# Patient Record
Sex: FEMALE | Race: WHITE | Employment: OTHER | URBAN - METROPOLITAN AREA
[De-identification: names, ages, dates, MRNs, and addresses within clinical notes are randomized per-mention and may not be internally consistent; named-entity substitution may affect disease eponyms.]

---

## 2017-05-27 ENCOUNTER — APPOINTMENT (OUTPATIENT)
Dept: GENERAL RADIOLOGY | Age: 67
DRG: 315 | End: 2017-05-27
Attending: EMERGENCY MEDICINE
Payer: MEDICARE

## 2017-05-27 ENCOUNTER — HOSPITAL ENCOUNTER (INPATIENT)
Age: 67
LOS: 3 days | Discharge: HOME | DRG: 315 | End: 2017-05-30
Attending: EMERGENCY MEDICINE | Admitting: INTERNAL MEDICINE
Payer: MEDICARE

## 2017-05-27 DIAGNOSIS — I21.4 NSTEMI (NON-ST ELEVATED MYOCARDIAL INFARCTION) (HCC): Primary | ICD-10-CM

## 2017-05-27 PROBLEM — I50.32 DIASTOLIC CHF, CHRONIC (HCC): Status: ACTIVE | Noted: 2017-05-27

## 2017-05-27 LAB
ALBUMIN SERPL BCP-MCNC: 3.2 G/DL (ref 3.2–4.6)
ALBUMIN/GLOB SERPL: 0.8 {RATIO} (ref 1.2–3.5)
ALP SERPL-CCNC: 82 U/L (ref 50–136)
ALT SERPL-CCNC: 35 U/L (ref 12–65)
ANION GAP BLD CALC-SCNC: 8 MMOL/L (ref 7–16)
AST SERPL W P-5'-P-CCNC: 25 U/L (ref 15–37)
ATRIAL RATE: 72 BPM
BASOPHILS # BLD AUTO: 0 K/UL (ref 0–0.2)
BASOPHILS # BLD: 0 % (ref 0–2)
BILIRUB SERPL-MCNC: 0.8 MG/DL (ref 0.2–1.1)
BNP SERPL-MCNC: 387 PG/ML
BUN SERPL-MCNC: 23 MG/DL (ref 8–23)
CALCIUM SERPL-MCNC: 8.8 MG/DL (ref 8.3–10.4)
CALCULATED R AXIS, ECG10: -46 DEGREES
CALCULATED T AXIS, ECG11: -71 DEGREES
CHLORIDE SERPL-SCNC: 100 MMOL/L (ref 98–107)
CO2 SERPL-SCNC: 28 MMOL/L (ref 21–32)
CREAT SERPL-MCNC: 1.03 MG/DL (ref 0.6–1)
D DIMER PPP FEU-MCNC: 0.59 UG/ML(FEU)
DIAGNOSIS, 93000: NORMAL
DIFFERENTIAL METHOD BLD: ABNORMAL
EOSINOPHIL # BLD: 0.1 K/UL (ref 0–0.8)
EOSINOPHIL NFR BLD: 1 % (ref 0.5–7.8)
ERYTHROCYTE [DISTWIDTH] IN BLOOD BY AUTOMATED COUNT: 13.7 % (ref 11.9–14.6)
EST. AVERAGE GLUCOSE BLD GHB EST-MCNC: 114 MG/DL
GLOBULIN SER CALC-MCNC: 3.9 G/DL (ref 2.3–3.5)
GLUCOSE SERPL-MCNC: 104 MG/DL (ref 65–100)
HBA1C MFR BLD: 5.6 % (ref 4.8–6)
HCT VFR BLD AUTO: 34.6 % (ref 35.8–46.3)
HGB BLD-MCNC: 11.4 G/DL (ref 11.7–15.4)
IMM GRANULOCYTES # BLD: 0 K/UL (ref 0–0.5)
IMM GRANULOCYTES NFR BLD AUTO: 0.4 % (ref 0–5)
LYMPHOCYTES # BLD AUTO: 22 % (ref 13–44)
LYMPHOCYTES # BLD: 2.3 K/UL (ref 0.5–4.6)
MCH RBC QN AUTO: 29.1 PG (ref 26.1–32.9)
MCHC RBC AUTO-ENTMCNC: 32.9 G/DL (ref 31.4–35)
MCV RBC AUTO: 88.3 FL (ref 79.6–97.8)
MONOCYTES # BLD: 1.7 K/UL (ref 0.1–1.3)
MONOCYTES NFR BLD AUTO: 17 % (ref 4–12)
NEUTS SEG # BLD: 6.1 K/UL (ref 1.7–8.2)
NEUTS SEG NFR BLD AUTO: 60 % (ref 43–78)
PLATELET # BLD AUTO: 244 K/UL (ref 150–450)
PMV BLD AUTO: 10.4 FL (ref 10.8–14.1)
POTASSIUM SERPL-SCNC: 3.9 MMOL/L (ref 3.5–5.1)
PROT SERPL-MCNC: 7.1 G/DL (ref 6.3–8.2)
Q-T INTERVAL, ECG07: 446 MS
QRS DURATION, ECG06: 114 MS
QTC CALCULATION (BEZET), ECG08: 508 MS
RBC # BLD AUTO: 3.92 M/UL (ref 4.05–5.25)
SODIUM SERPL-SCNC: 136 MMOL/L (ref 136–145)
TROPONIN I SERPL-MCNC: 0.16 NG/ML (ref 0.02–0.05)
TROPONIN I SERPL-MCNC: 0.2 NG/ML (ref 0.02–0.05)
TSH SERPL DL<=0.005 MIU/L-ACNC: 0.79 UIU/ML (ref 0.36–3.74)
VENTRICULAR RATE, ECG03: 78 BPM
WBC # BLD AUTO: 10.3 K/UL (ref 4.3–11.1)

## 2017-05-27 PROCEDURE — 84484 ASSAY OF TROPONIN QUANT: CPT | Performed by: EMERGENCY MEDICINE

## 2017-05-27 PROCEDURE — 84443 ASSAY THYROID STIM HORMONE: CPT | Performed by: INTERNAL MEDICINE

## 2017-05-27 PROCEDURE — 36415 COLL VENOUS BLD VENIPUNCTURE: CPT | Performed by: PHYSICIAN ASSISTANT

## 2017-05-27 PROCEDURE — 74011250637 HC RX REV CODE- 250/637: Performed by: PHYSICIAN ASSISTANT

## 2017-05-27 PROCEDURE — 80053 COMPREHEN METABOLIC PANEL: CPT | Performed by: EMERGENCY MEDICINE

## 2017-05-27 PROCEDURE — 99285 EMERGENCY DEPT VISIT HI MDM: CPT | Performed by: EMERGENCY MEDICINE

## 2017-05-27 PROCEDURE — 85025 COMPLETE CBC W/AUTO DIFF WBC: CPT | Performed by: EMERGENCY MEDICINE

## 2017-05-27 PROCEDURE — 85379 FIBRIN DEGRADATION QUANT: CPT | Performed by: INTERNAL MEDICINE

## 2017-05-27 PROCEDURE — 65660000000 HC RM CCU STEPDOWN

## 2017-05-27 PROCEDURE — 71020 XR CHEST PA LAT: CPT

## 2017-05-27 PROCEDURE — 83880 ASSAY OF NATRIURETIC PEPTIDE: CPT | Performed by: EMERGENCY MEDICINE

## 2017-05-27 PROCEDURE — 96360 HYDRATION IV INFUSION INIT: CPT | Performed by: EMERGENCY MEDICINE

## 2017-05-27 PROCEDURE — 74011250636 HC RX REV CODE- 250/636: Performed by: EMERGENCY MEDICINE

## 2017-05-27 PROCEDURE — 83036 HEMOGLOBIN GLYCOSYLATED A1C: CPT | Performed by: PHYSICIAN ASSISTANT

## 2017-05-27 PROCEDURE — 93005 ELECTROCARDIOGRAM TRACING: CPT | Performed by: EMERGENCY MEDICINE

## 2017-05-27 PROCEDURE — 74011250636 HC RX REV CODE- 250/636: Performed by: PHYSICIAN ASSISTANT

## 2017-05-27 PROCEDURE — 74011250637 HC RX REV CODE- 250/637: Performed by: EMERGENCY MEDICINE

## 2017-05-27 PROCEDURE — 74011250637 HC RX REV CODE- 250/637: Performed by: INTERNAL MEDICINE

## 2017-05-27 RX ORDER — METOPROLOL SUCCINATE 25 MG/1
25 TABLET, EXTENDED RELEASE ORAL DAILY
Status: DISCONTINUED | OUTPATIENT
Start: 2017-05-28 | End: 2017-05-29

## 2017-05-27 RX ORDER — HEPARIN SODIUM 5000 [USP'U]/ML
4000 INJECTION, SOLUTION INTRAVENOUS; SUBCUTANEOUS ONCE
Status: COMPLETED | OUTPATIENT
Start: 2017-05-27 | End: 2017-05-27

## 2017-05-27 RX ORDER — LISINOPRIL 5 MG/1
5 TABLET ORAL DAILY
Status: DISCONTINUED | OUTPATIENT
Start: 2017-05-28 | End: 2017-05-30 | Stop reason: HOSPADM

## 2017-05-27 RX ORDER — PANTOPRAZOLE SODIUM 40 MG/1
40 TABLET, DELAYED RELEASE ORAL
Status: DISCONTINUED | OUTPATIENT
Start: 2017-05-28 | End: 2017-05-30 | Stop reason: HOSPADM

## 2017-05-27 RX ORDER — ACETAMINOPHEN 325 MG/1
650 TABLET ORAL
Status: DISCONTINUED | OUTPATIENT
Start: 2017-05-27 | End: 2017-05-30 | Stop reason: HOSPADM

## 2017-05-27 RX ORDER — MONTELUKAST SODIUM 10 MG/1
10 TABLET ORAL DAILY
Status: DISCONTINUED | OUTPATIENT
Start: 2017-05-28 | End: 2017-05-30 | Stop reason: HOSPADM

## 2017-05-27 RX ORDER — NITROGLYCERIN 0.4 MG/1
0.4 TABLET SUBLINGUAL
Status: DISCONTINUED | OUTPATIENT
Start: 2017-05-27 | End: 2017-05-30 | Stop reason: HOSPADM

## 2017-05-27 RX ORDER — POTASSIUM CHLORIDE 750 MG/1
10 TABLET, EXTENDED RELEASE ORAL DAILY
Status: DISCONTINUED | OUTPATIENT
Start: 2017-05-28 | End: 2017-05-30 | Stop reason: HOSPADM

## 2017-05-27 RX ORDER — DIPHENHYDRAMINE HCL 25 MG
25 CAPSULE ORAL
Status: DISCONTINUED | OUTPATIENT
Start: 2017-05-27 | End: 2017-05-30 | Stop reason: HOSPADM

## 2017-05-27 RX ORDER — FLUTICASONE PROPIONATE 50 MCG
2 SPRAY, SUSPENSION (ML) NASAL DAILY
Status: DISCONTINUED | OUTPATIENT
Start: 2017-05-28 | End: 2017-05-30 | Stop reason: HOSPADM

## 2017-05-27 RX ORDER — HEPARIN SODIUM 5000 [USP'U]/100ML
12-25 INJECTION, SOLUTION INTRAVENOUS
Status: DISCONTINUED | OUTPATIENT
Start: 2017-05-27 | End: 2017-05-29

## 2017-05-27 RX ORDER — NITROGLYCERIN 0.4 MG/1
0.4 TABLET SUBLINGUAL
Status: DISCONTINUED | OUTPATIENT
Start: 2017-05-27 | End: 2017-05-30 | Stop reason: SDUPTHER

## 2017-05-27 RX ORDER — GUAIFENESIN 100 MG/5ML
81 LIQUID (ML) ORAL DAILY
Status: DISCONTINUED | OUTPATIENT
Start: 2017-05-28 | End: 2017-05-29

## 2017-05-27 RX ORDER — ZOLPIDEM TARTRATE 5 MG/1
5 TABLET ORAL
Status: DISCONTINUED | OUTPATIENT
Start: 2017-05-27 | End: 2017-05-30 | Stop reason: HOSPADM

## 2017-05-27 RX ORDER — NABUMETONE 750 MG/1
750 TABLET, FILM COATED ORAL DAILY
Status: DISCONTINUED | OUTPATIENT
Start: 2017-05-28 | End: 2017-05-30 | Stop reason: HOSPADM

## 2017-05-27 RX ORDER — ALPRAZOLAM 0.5 MG/1
0.5 TABLET ORAL
Status: DISCONTINUED | OUTPATIENT
Start: 2017-05-27 | End: 2017-05-30 | Stop reason: HOSPADM

## 2017-05-27 RX ORDER — SODIUM CHLORIDE 0.9 % (FLUSH) 0.9 %
5-10 SYRINGE (ML) INJECTION AS NEEDED
Status: DISCONTINUED | OUTPATIENT
Start: 2017-05-27 | End: 2017-05-30 | Stop reason: HOSPADM

## 2017-05-27 RX ADMIN — DIPHENHYDRAMINE HYDROCHLORIDE 25 MG: 25 CAPSULE ORAL at 21:47

## 2017-05-27 RX ADMIN — NITROGLYCERIN 0.4 MG: 0.4 TABLET SUBLINGUAL at 16:51

## 2017-05-27 RX ADMIN — SODIUM CHLORIDE 1000 ML: 900 INJECTION, SOLUTION INTRAVENOUS at 16:42

## 2017-05-27 RX ADMIN — HEPARIN SODIUM AND DEXTROSE 12 UNITS/KG/HR: 5000; 5 INJECTION INTRAVENOUS at 19:59

## 2017-05-27 RX ADMIN — ACETAMINOPHEN 650 MG: 325 TABLET ORAL at 20:06

## 2017-05-27 RX ADMIN — HEPARIN SODIUM 4000 UNITS: 5000 INJECTION, SOLUTION INTRAVENOUS; SUBCUTANEOUS at 19:55

## 2017-05-27 RX ADMIN — NITROGLYCERIN 1 INCH: 20 OINTMENT TOPICAL at 20:28

## 2017-05-27 RX ADMIN — NITROGLYCERIN 0.4 MG: 0.4 TABLET SUBLINGUAL at 16:42

## 2017-05-27 NOTE — ED PROVIDER NOTES
HPI Comments: Pt with atrial fibrillation on eliquis. Has high blood pressure. Had a heart catheteraround a year ago which was negative. No Stents needed. sstarted with sternal chest pain radiating to the left shoulder and left jaw 2 days ago. Continued all day yesterday and today pressure in nature. Pain becomes worse at night. Has nausea but no vomiting. Shortness of breath. No numbness or diaphoresis. Patient is a 69 y.o. female presenting with chest pain. The history is provided by the patient. No  was used. Chest Pain (Angina)    This is a new problem. The current episode started 2 days ago. The problem has been gradually worsening. Duration of episode(s) is 2 days. The problem occurs constantly. The pain is associated with normal activity. Pain location: sternal. The pain is moderate. The quality of the pain is described as pressure-like. The pain radiates to the left jaw and left arm. Associated symptoms include nausea and shortness of breath. Pertinent negatives include no abdominal pain, no back pain, no cough, no diaphoresis, no dizziness, no exertional chest pressure, no fever, no headaches, no irregular heartbeat, no leg pain, no lower extremity edema, no malaise/fatigue, no numbness, no palpitations, no vomiting and no weakness. She has tried aspirin for the symptoms. The treatment provided no relief. Risk factors include cardiac disease. Her past medical history is significant for HTN. Procedural history includes cardiac catheterization. Pertinent negatives include no cardiac stents.        Past Medical History:   Diagnosis Date    Congestive heart failure ([address]     Essential hypertension     Hyperlipidemia        Past Surgical History:   Procedure Laterality Date    HX CHOLECYSTECTOMY      HX GASTRIC BYPASS      HX HYSTERECTOMY           Family History:   Problem Relation Age of Onset    Coronary Artery Disease Father     Heart Attack Father     Stroke Father  Sudden Death Father        Social History     Social History    Marital status:      Spouse name: N/A    Number of children: N/A    Years of education: N/A     Occupational History    Not on file. Social History Main Topics    Smoking status: Never Smoker    Smokeless tobacco: Never Used    Alcohol use No    Drug use: Not on file    Sexual activity: Not on file     Other Topics Concern    Not on file     Social History Narrative         ALLERGIES: Other food; Atorvastatin; Azo pms [chaste-black cohosh-amer gins]; Codeine; Hydrocodone; Oxycodone; Phenazopyridine; and Statins-hmg-coa reductase inhibitors    Review of Systems   Constitutional: Negative for chills, diaphoresis, fever and malaise/fatigue. HENT: Negative for rhinorrhea and sore throat. Eyes: Negative for pain and redness. Respiratory: Positive for shortness of breath. Negative for cough, chest tightness and wheezing. Cardiovascular: Positive for chest pain and leg swelling. Negative for palpitations. Gastrointestinal: Positive for nausea. Negative for abdominal pain, diarrhea and vomiting. Genitourinary: Negative for dysuria and hematuria. Musculoskeletal: Negative for back pain, gait problem, neck pain and neck stiffness. Skin: Negative for color change and rash. Neurological: Negative for dizziness, weakness, numbness and headaches. Vitals:    05/27/17 1519   BP: 111/69   Pulse: 81   Resp: 18   Temp: 98.1 °F (36.7 °C)   SpO2: 99%   Weight: 88.5 kg (195 lb)   Height: 5' 3\" (1.6 m)            Physical Exam   Constitutional: She is oriented to person, place, and time. She appears well-developed and well-nourished. HENT:   Head: Normocephalic and atraumatic. Neck: Normal range of motion. Neck supple. Cardiovascular: An irregularly irregular rhythm present. No murmur heard. Pulmonary/Chest: Effort normal and breath sounds normal. She has no wheezes. She exhibits no tenderness. Abdominal: Soft. Bowel sounds are normal. There is no tenderness. Musculoskeletal: Normal range of motion. She exhibits edema (non pitting). She exhibits no tenderness. Neurological: She is alert and oriented to person, place, and time. Skin: Skin is warm and dry. Nursing note and vitals reviewed. MDM  Number of Diagnoses or Management Options  Diagnosis management comments: NSTEMI. Will admit. Amount and/or Complexity of Data Reviewed  Clinical lab tests: ordered and reviewed  Tests in the radiology section of CPT®: ordered and reviewed  Tests in the medicine section of CPT®: ordered and reviewed    Patient Progress  Patient progress: stable    ED Course       Procedures      EKG: nonspecific ST and T waves changes, atrial fibrillation, rate 78. XR CHEST PA LAT (Final result) Result time: 05/27/17 16:01:49     Final result by Fiona Huerta MD (05/27/17 16:01:49)     Impression:     IMPRESSION:   1.  Mild cardiomegaly of uncertain acuity.           Narrative:     CHEST X-RAY, 2 views 5/27/2017    History: Chest pain for several days. Midsternal pain radiating to left  shoulder/arm. Technique: PA and lateral views of the chest.     Comparison: None. Findings: The cardiac silhouette is mildly enlarged.  The lungs are expanded without  evidence for pneumothorax.  No consolidation, or evidence of pleural effusion is  seen.     The bony thorax demonstrates no acute changes.  The upper abdomen is  unremarkable in appearance.           Results Include:    Recent Results (from the past 24 hour(s))   EKG, 12 LEAD, INITIAL    Collection Time: 05/27/17  3:19 PM   Result Value Ref Range    Ventricular Rate 78 BPM    Atrial Rate 72 BPM    QRS Duration 114 ms    Q-T Interval 446 ms    QTC Calculation (Bezet) 508 ms    Calculated R Axis -46 degrees    Calculated T Axis -71 degrees    Diagnosis       Atrial fibrillation with a competing junctional pacemaker  Left anterior fascicular block  Minimal voltage criteria for LVH, may be normal variant  Possible Anterior infarct , age undetermined  T wave abnormality, consider inferolateral ischemia  Abnormal ECG  No previous ECGs available     CBC WITH AUTOMATED DIFF    Collection Time: 05/27/17  3:25 PM   Result Value Ref Range    WBC 10.3 4.3 - 11.1 K/uL    RBC 3.92 (L) 4.05 - 5.25 M/uL    HGB 11.4 (L) 11.7 - 15.4 g/dL    HCT 34.6 (L) 35.8 - 46.3 %    MCV 88.3 79.6 - 97.8 FL    MCH 29.1 26.1 - 32.9 PG    MCHC 32.9 31.4 - 35.0 g/dL    RDW 13.7 11.9 - 14.6 %    PLATELET 263 862 - 104 K/uL    MPV 10.4 (L) 10.8 - 14.1 FL    DF AUTOMATED      NEUTROPHILS 60 43 - 78 %    LYMPHOCYTES 22 13 - 44 %    MONOCYTES 17 (H) 4.0 - 12.0 %    EOSINOPHILS 1 0.5 - 7.8 %    BASOPHILS 0 0.0 - 2.0 %    IMMATURE GRANULOCYTES 0.4 0.0 - 5.0 %    ABS. NEUTROPHILS 6.1 1.7 - 8.2 K/UL    ABS. LYMPHOCYTES 2.3 0.5 - 4.6 K/UL    ABS. MONOCYTES 1.7 (H) 0.1 - 1.3 K/UL    ABS. EOSINOPHILS 0.1 0.0 - 0.8 K/UL    ABS. BASOPHILS 0.0 0.0 - 0.2 K/UL    ABS. IMM. GRANS. 0.0 0.0 - 0.5 K/UL   METABOLIC PANEL, COMPREHENSIVE    Collection Time: 05/27/17  3:25 PM   Result Value Ref Range    Sodium 136 136 - 145 mmol/L    Potassium 3.9 3.5 - 5.1 mmol/L    Chloride 100 98 - 107 mmol/L    CO2 28 21 - 32 mmol/L    Anion gap 8 7 - 16 mmol/L    Glucose 104 (H) 65 - 100 mg/dL    BUN 23 8 - 23 MG/DL    Creatinine 1.03 (H) 0.6 - 1.0 MG/DL    GFR est AA >60 >60 ml/min/1.73m2    GFR est non-AA 56 (L) >60 ml/min/1.73m2    Calcium 8.8 8.3 - 10.4 MG/DL    Bilirubin, total 0.8 0.2 - 1.1 MG/DL    ALT (SGPT) 35 12 - 65 U/L    AST (SGOT) 25 15 - 37 U/L    Alk.  phosphatase 82 50 - 136 U/L    Protein, total 7.1 6.3 - 8.2 g/dL    Albumin 3.2 3.2 - 4.6 g/dL    Globulin 3.9 (H) 2.3 - 3.5 g/dL    A-G Ratio 0.8 (L) 1.2 - 3.5     TROPONIN I    Collection Time: 05/27/17  3:25 PM   Result Value Ref Range    Troponin-I, Qt. 0.20 (HH) 0.02 - 0.05 NG/ML

## 2017-05-27 NOTE — PROGRESS NOTES
Patient arrived on the floor via transport personal from the ER and was placed in room 306. Patient states that she has minor chest pain (2/10) at xyphoid process but more pain in her back between her right and left scapula's that radiates up the back of her neck. Skin assessment:  Skin is clean, dry and intact. Patient has no wounds or sores. Patient is ambulatory and A&O x 4.

## 2017-05-27 NOTE — H&P
[facility] CARDIOLOGY History &Physical                 Primary Cardiologist: Dr Rayshawn Francis     Primary Care Physician: Dr Efe Ramachandran    Admitting Physician: Dr Walter Combs:     Patient is a 69 y.o. female who presents with chest pain. She has a h/o PAF w recent monitor showing rates 50-70 on eliquis. LHC  w minimal CAD, echo 5-2017 w EF 55-60% with severe LAE and mild MR. She has been under more stress recently dealing with a drug addicted brother who is living with her demented mother. On Thursday she was under a lot of stress with her family when she began having SSCP which to her neck and shoulder. Pain has been constant since Thursday, worse at night when she lays flat and takes a deep breath. Friday day pain was less intense but persisted last night and today she came to the ER. She has mild SOB, feels her heart racing at times, no dizziness, mild nausea at times and diaphoresis last night. Pain is sharp and at it's worst 10/10. In ER nitro has not helped with the pain. No F/C. In ER EKG shows a fib w rate 78 w more diffuse t wave inversion than . Troponin . 2, WBC 10.3, CXR mild cardiomegaly, /63. Pain currently 6/10.       Past Medical History:   Diagnosis Date    Congestive heart failure ([address]     Essential hypertension     Hyperlipidemia       Past Surgical History:   Procedure Laterality Date    HX CHOLECYSTECTOMY      HX GASTRIC BYPASS      HX HYSTERECTOMY        Allergies   Allergen Reactions    Other Food Unknown (comments)     Nuts b[place]    Atorvastatin Unknown (comments)    Azo Pms [Chaste-Black Cohosh-Amer Gins] Itching    Codeine Itching    Hydrocodone Unknown (comments)    Oxycodone Unknown (comments)    Phenazopyridine Unknown (comments)    Statins-Hmg-Coa Reductase Inhibitors Myalgia     Social History   Substance Use Topics    Smoking status: Never Smoker    Smokeless tobacco: Never Used    Alcohol use No      FH:   Family History   Problem Relation Age of Onset    Coronary Artery Disease Father     Heart Attack Father     Stroke Father     Sudden Death Father         Review of Systems  General: no weight loss, + chronic fatigue and weakness, no fever or chills  Skin: no rashes, lumps, or other skin changes  HEENT: no headache, dizziness, lightheadedness, vision changes, hearing changes, tinnitus, vertigo, sinus pressure/pain, bleeding gums, sore throat, or hoarseness  Neck: no swollen glands, goiter, pain or stiffness  Respiratory: no cough, sputum, hemoptysis, + dyspnea, no wheezing  Cardiovascular: + as per HPI  Gastrointestinal: + reflux, no constipation, diarrhea, liver problems, GI bleeding  Urinary: no frequency, urgency , hematuria, burning/pain with urination, recent flank pain, polyuria, nocturia, or difficulty urinating  Peripheral Vascular: no claudication, leg cramps, prior DVTs, swelling of calves, legs, or feet, color change, or swelling with redness or tenderness  Musculoskeletal: + neck pain, DJD  Psychiatric: no depression or excessive stress  Neurological: no sensory or motor loss, seizures, syncope, tremors, numbness, tingling, no changes in mood, attention, or speech, no changes in orientation, memory, insight, or judgment. Hematologic: no anemia, easy bruising or bleeding  Endocrine: no thyroid problems, heat or cold intolerance, excessive sweating, polyuria, polydipsia, no diabetes.         Objective:       Visit Vitals    /63    Pulse 76    Temp 98.1 °F (36.7 °C)    Resp 18    Ht 5' 3\" (1.6 m)    Wt 88.5 kg (195 lb)    SpO2 99%    BMI 34.54 kg/m2               Physical Exam:  General: Well Developed, Well Nourished, No Acute Distress  HEENT: pupils equal and round, no abnormalities noted  Neck: supple, no JVD, no carotid bruits  Heart: S1S2 irregularly irregular   Lungs: Clear throughout auscultation bilaterally without adventitious sounds  Abd: soft, nontender, nondistended, with good bowel sounds  Ext: warm, no edema, calves supple/nontender, pulses 2+ bilaterally  Skin: warm and dry  Psychiatric: Normal mood and affect  Neurologic: Alert and oriented X 3      ECG: a fib w rate 78 w more diffuse t wave inversion than     Data Review:      Recent Results (from the past 24 hour(s))   EKG, 12 LEAD, INITIAL    Collection Time: 05/27/17  3:19 PM   Result Value Ref Range    Ventricular Rate 78 BPM    Atrial Rate 72 BPM    QRS Duration 114 ms    Q-T Interval 446 ms    QTC Calculation (Bezet) 508 ms    Calculated R Axis -46 degrees    Calculated T Axis -71 degrees    Diagnosis       Atrial fibrillation with a competing junctional pacemaker  Left anterior fascicular block  Minimal voltage criteria for LVH, may be normal variant  Possible Anterior infarct , age undetermined  T wave abnormality, consider inferolateral ischemia  Abnormal ECG  No previous ECGs available     CBC WITH AUTOMATED DIFF    Collection Time: 05/27/17  3:25 PM   Result Value Ref Range    WBC 10.3 4.3 - 11.1 K/uL    RBC 3.92 (L) 4.05 - 5.25 M/uL    HGB 11.4 (L) 11.7 - 15.4 g/dL    HCT 34.6 (L) 35.8 - 46.3 %    MCV 88.3 79.6 - 97.8 FL    MCH 29.1 26.1 - 32.9 PG    MCHC 32.9 31.4 - 35.0 g/dL    RDW 13.7 11.9 - 14.6 %    PLATELET 332 327 - 688 K/uL    MPV 10.4 (L) 10.8 - 14.1 FL    DF AUTOMATED      NEUTROPHILS 60 43 - 78 %    LYMPHOCYTES 22 13 - 44 %    MONOCYTES 17 (H) 4.0 - 12.0 %    EOSINOPHILS 1 0.5 - 7.8 %    BASOPHILS 0 0.0 - 2.0 %    IMMATURE GRANULOCYTES 0.4 0.0 - 5.0 %    ABS. NEUTROPHILS 6.1 1.7 - 8.2 K/UL    ABS. LYMPHOCYTES 2.3 0.5 - 4.6 K/UL    ABS. MONOCYTES 1.7 (H) 0.1 - 1.3 K/UL    ABS. EOSINOPHILS 0.1 0.0 - 0.8 K/UL    ABS. BASOPHILS 0.0 0.0 - 0.2 K/UL    ABS. IMM.  GRANS. 0.0 0.0 - 0.5 K/UL   METABOLIC PANEL, COMPREHENSIVE    Collection Time: 05/27/17  3:25 PM   Result Value Ref Range    Sodium 136 136 - 145 mmol/L    Potassium 3.9 3.5 - 5.1 mmol/L    Chloride 100 98 - 107 mmol/L    CO2 28 21 - 32 mmol/L    Anion gap 8 7 - 16 mmol/L Glucose 104 (H) 65 - 100 mg/dL    BUN 23 8 - 23 MG/DL    Creatinine 1.03 (H) 0.6 - 1.0 MG/DL    GFR est AA >60 >60 ml/min/1.73m2    GFR est non-AA 56 (L) >60 ml/min/1.73m2    Calcium 8.8 8.3 - 10.4 MG/DL    Bilirubin, total 0.8 0.2 - 1.1 MG/DL    ALT (SGPT) 35 12 - 65 U/L    AST (SGOT) 25 15 - 37 U/L    Alk. phosphatase 82 50 - 136 U/L    Protein, total 7.1 6.3 - 8.2 g/dL    Albumin 3.2 3.2 - 4.6 g/dL    Globulin 3.9 (H) 2.3 - 3.5 g/dL    A-G Ratio 0.8 (L) 1.2 - 3.5     TROPONIN I    Collection Time: 05/27/17  3:25 PM   Result Value Ref Range    Troponin-I, Qt. 0.20 (HH) 0.02 - 0.05 NG/ML   BNP    Collection Time: 05/27/17  3:25 PM   Result Value Ref Range     pg/mL       CXR: Mild cardiomegaly     Assessment/Plan:   NSTEMI - Pt with mildly elevated troponin and new t wave inversion on EKG w CP, increased stress recently, but [facility] last fall with mild CAD. Sx's maybe related to Takotsubo cardiomyopathy. Admit, check serial troponin, cont ASA, heparin, ACE-I, BB, intolerant of statins, check echo. Hypertension- Cont ACE-I, BB. Diastolic CHF, chronic - Cont po lasix, check echo. Monitor volume status closely, given hydration in ER. Chronic fatigue- Stable. Paroxysmal atrial fibrillation- Monitor, hold eliquis while on heparin, cont BB. Ese Zapien PA-C  5/27/2017  5:25 PM    Attending Addendum     Patient independently seen and examined by me.   Agree with above note by physician extender with the following additions and exceptions: 67yo F with a history of HTN, diastolic dysfunction, persistent atrial fibrillation presnting with chest pain and positive troponin in setting of significant stress concerning for ACS vs Takotsubo cardiomyopathy    Key findings are:  No CP or BEAR  CV- irreg irreg without murmur  Lungs- Clear bilaterally  Ext- no edema    Plan: check serial troponins, will start heparin drip, cont ASA       --check lipid panel, TSH, A1C       --continue metoprolol, TTE in the AM       --check BPs in both arms and d-dimer to rule out dissection with persistent back pain       --further plan as above    True GIVENS.  [address] [facility]

## 2017-05-27 NOTE — ED TRIAGE NOTES
Pt. Complains of chest pain for several days. Midsternal radiating to left shoulder/arm. Hx of afib. Had a heart cath a yr ago and stated no stents. Pt. Cortez Singh from [facility]. Given 324mg asa by [facility]. No nitro.

## 2017-05-27 NOTE — PROGRESS NOTES
Verbal and bedside report given to Claudia crocker RN.   Orders to pharmacy released but at this time are still waiting approval.

## 2017-05-27 NOTE — ROUTINE PROCESS
TRANSFER - IN REPORT:    Verbal report received from Vinny Santos RN on [address]  being received from ER for routine progression of care      Report consisted of patients Situation, Background, Assessment and   Recommendations(SBAR). Information from the following report(s) SBAR was reviewed with the receiving nurse. Opportunity for questions and clarification was provided. Assessment completed upon patients arrival to unit and care assumed.

## 2017-05-27 NOTE — IP AVS SNAPSHOT
Current Discharge Medication List  
  
START taking these medications Dose & Instructions Dispensing Information Comments Morning Noon Evening Bedtime  
 carvedilol 6.25 mg tablet Commonly known as:  Maximus Yi Your next dose is:  THIS EVENING. Dose:  6.25 mg Take 1 Tab by mouth two (2) times daily (with meals). Quantity:  60 Tab Refills:  6 CONTINUE these medications which have CHANGED Dose & Instructions Dispensing Information Comments Morning Noon Evening Bedtime  
 lisinopril 5 mg tablet Commonly known as:  Akash Ontiveros What changed:   
- medication strength 
- how much to take Your next dose is:  TOMORROW MORNING. Dose:  5 mg Take 1 Tab by mouth daily. Quantity:  30 Tab Refills:  6 CONTINUE these medications which have NOT CHANGED Dose & Instructions Dispensing Information Comments Morning Noon Evening Bedtime  
 apixaban 5 mg tablet Commonly known as:  Castillo Catherine Dose:  5 mg Take 1 Tab by mouth two (2) times a day. Quantity:  60 Tab Refills:  11 CONTRAVE PO Dose:  8 mg Take 8 mg by mouth two (2) times a day. Refills:  0  
     
  
   
   
  
   
  
 cyanocobalamin 1,000 mcg tablet Dose:  1000 mcg Take 1,000 mcg by mouth daily. Refills:  0  
     
  
   
   
   
  
 estradiol 2 mg tablet Commonly known as:  ESTRACE Take  by mouth daily. 1 tab every other day/ .5 tab every other day Refills:  0  
     
  
   
   
   
  
 FLONASE 50 mcg/actuation nasal spray Generic drug:  fluticasone Dose:  2 Spray 2 Sprays by Both Nostrils route daily. Refills:  0  
     
  
   
   
   
  
 LASIX 40 mg tablet Generic drug:  furosemide Take  by mouth daily. Refills:  0  
     
  
   
   
   
  
 nabumetone 750 mg tablet Commonly known as:  RELAFEN  Dose:  750 mg  
 Take 750 mg by mouth daily. Refills:  0  
     
  
   
   
   
  
 potassium chloride SR 10 mEq tablet Commonly known as:  KLOR-CON 10 Take  by mouth. Refills:  0 PriLOSEC 20 mg capsule Generic drug:  omeprazole Dose:  20 mg Take 20 mg by mouth daily. Refills:  0 SINGULAIR 10 mg tablet Generic drug:  montelukast  
   
 Dose:  10 mg Take 10 mg by mouth daily. Refills:  0 STOP taking these medications   
 metoprolol succinate 25 mg XL tablet Commonly known as:  TOPROL-XL Where to Get Your Medications Information on where to get these meds will be given to you by the nurse or doctor. ! Ask your nurse or doctor about these medications  
  carvedilol 6.25 mg tablet  
 lisinopril 5 mg tablet

## 2017-05-27 NOTE — IP AVS SNAPSHOT
Miguel Sneed 
 
 
 [address] [facility] [address] [facility] 
[phone] Patient: Renato Alcazar MRN: [id number] BSY:[birth year] You are allergic to the following Allergen Reactions Other Food Unknown (comments) Nuts brazil Atorvastatin Unknown (comments) Azo Pms (Chaste-Black Cohosh-Amer Gins) Itching Codeine Itching Hydrocodone Unknown (comments) Oxycodone Unknown (comments) Phenazopyridine Unknown (comments) Statins-Hmg-Coa Reductase Inhibitors Myalgia Recent Documentation Height Weight BMI Smoking Status 1.6 m 91.8 kg 35.85 kg/m2 Never Smoker Emergency Contacts Name Discharge Info Relation Home Work Mobile Chapo Arevalo DISCHARGE CAREGIVER [3] Spouse [3] [phone] About your hospitalization You were admitted on:  May 27, 2017 You last received care in the:  [facility] 3 TELEMETRY You were discharged on:  May 30, 2017 Unit phone number:  [phone] Why you were hospitalized Your primary diagnosis was: Takotsubo Cardiomyopathy Your diagnoses also included:  Chronic Fatigue, Hypertension, Paroxysmal Atrial Fibrillation (Hcc), Diastolic Chf, Chronic (Hcc), Systolic Chf, Chronic (Hcc) Providers Seen During Your Hospitalizations Provider Role Specialty Primary office phone Shukri Hui MD Attending Provider Emergency Medicine [phone] Asif Quintanilla MD Attending Provider Cardiology [phone] Your Primary Care Physician (PCP) Primary Care Physician Office Phone Office Fax [address] [address] [phone] Follow-up Information Follow up With Details Comments Contact Info Herminia Zhao MD  As needed [address] [place] [address] [address] [facility] [place] [address] 
[phone]  Vale Patel DO On 6/14/2017 Wednesday, June 14 @ 4:30th-- [place] office  [address] [facility] [place] [address] 
[phone] Your Appointments Wednesday June 14, 2017  4:30 PM EDT TRANSITIONAL CARE MANAGEMENT with Darrell Castillo DO [facility] ([address] [place] [address]) [address]  
[address] [place] [address]  
[phone] Thursday June 22, 2017 10:45 AM EDT Office Visit with Darrell Castillo DO [facility] ([address] [place] [address]) [address]  
[phone] Current Discharge Medication List  
  
START taking these medications Dose & Instructions Dispensing Information Comments Morning Noon Evening Bedtime  
 carvedilol 6.25 mg tablet Commonly known as:  Anthony Shy Your next dose is:  THIS EVENING. Dose:  6.25 mg Take 1 Tab by mouth two (2) times daily (with meals). Quantity:  60 Tab Refills:  6 CONTINUE these medications which have CHANGED Dose & Instructions Dispensing Information Comments Morning Noon Evening Bedtime  
 lisinopril 5 mg tablet Commonly known as:  Shalonda Garcia What changed:   
- medication strength 
- how much to take Your next dose is:  TOMORROW MORNING. Dose:  5 mg Take 1 Tab by mouth daily. Quantity:  30 Tab Refills:  6 CONTINUE these medications which have NOT CHANGED Dose & Instructions Dispensing Information Comments Morning Noon Evening Bedtime  
 apixaban 5 mg tablet Commonly known as:  Claudean Fries Dose:  5 mg Take 1 Tab by mouth two (2) times a day. Quantity:  60 Tab Refills:  11 CONTRAVE PO Dose:  8 mg Take 8 mg by mouth two (2) times a day. Refills:  0  
     
  
   
   
  
   
  
 cyanocobalamin 1,000 mcg tablet Dose:  1000 mcg Take 1,000 mcg by mouth daily. Refills:  0 estradiol 2 mg tablet Commonly known as:  ESTRACE Take  by mouth daily. 1 tab every other day/ .5 tab every other day Refills:  0  
     
  
   
   
   
  
 FLONASE 50 mcg/actuation nasal spray Generic drug:  fluticasone Dose:  2 Spray 2 Sprays by Both Nostrils route daily. Refills:  0  
     
  
   
   
   
  
 LASIX 40 mg tablet Generic drug:  furosemide Take  by mouth daily. Refills:  0  
     
  
   
   
   
  
 nabumetone 750 mg tablet Commonly known as:  RELAFEN Dose:  750 mg Take 750 mg by mouth daily. Refills:  0  
     
  
   
   
   
  
 potassium chloride SR 10 mEq tablet Commonly known as:  KLOR-CON 10 Take  by mouth. Refills:  0 PriLOSEC 20 mg capsule Generic drug:  omeprazole Dose:  20 mg Take 20 mg by mouth daily. Refills:  0 SINGULAIR 10 mg tablet Generic drug:  montelukast  
   
 Dose:  10 mg Take 10 mg by mouth daily. Refills:  0 STOP taking these medications   
 metoprolol succinate 25 mg XL tablet Commonly known as:  TOPROL-XL Where to Get Your Medications Information on where to get these meds will be given to you by the nurse or doctor. ! Ask your nurse or doctor about these medications  
  carvedilol 6.25 mg tablet  
 lisinopril 5 mg tablet Discharge Instructions Learning About Broken Heart Syndrome What is broken heart syndrome? With broken heart syndrome, the heart has trouble pumping blood normally. A chamber of the heart swells up like a small balloon. Broken heart syndrome is also called stress-induced cardiomyopathy or takotsubo cardiomyopathy (say \"KSEM-mf-kem-boh hhl-fng-mz-hs-YXU-jv-thee\"). Broken heart syndrome causes the same symptoms as a heart attack, but it's not a heart attack. Some of the most common symptoms are: 
· Sudden chest pain. · Shortness of breath. · Fainting. Other symptoms may include: · A pounding or fast heartbeat. · Nausea. · Vomiting. A heart attack happens when one or more of the coronary arteries is blocked. These arteries supply the heart muscle with blood. When blood flow is blocked, part of the heart muscle may be permanently damaged. But in broken heart syndrome, the arteries are not blocked. There is usually no permanent damage to the heart. Broken heart syndrome is often triggered by great emotional stress, such as grief after losing a loved one. It can also be triggered by physical stress, such as being in the hospital for surgery. Sometimes it's not known what triggers broken heart syndrome. How is broken heart syndrome diagnosed? Because symptoms are the same as a heart attack, you probably had tests to make sure you did not have a heart attack. These tests include: · Blood tests, to look for damage to the heart muscle. · Imaging tests such as an X-ray or an echocardiogram (ultrasound). These tests can show if a heart chamber has swelled up. They can also show if your heart is pumping normally. · An electrocardiogram (EKG), to measure your heart's electrical activity. · A cardiac catheterization. Results from the other tests may have looked like you had a heart attack. If so, you probably had a cardiac catheterization. This test lets your doctor look at the coronary arteries that supply blood to your heart. It helps to confirm that your coronary arteries are not blocked and that you did not have a heart attack. How is it treated? You may be in intensive care for a short time. You may stay in the hospital for a few days. After you leave the hospital, you may have some more tests. These tests are to check how well your heart is pumping blood. You will likely take medicines for a short time to help your heart muscle recover.  These may include medicines that make it easier for your heart to pump blood. Some people may need to take medicines long-term. What can you expect when you have broken heart syndrome? In most people, the heart starts pumping normally again within a few days or weeks. For some people, it can take several months to return to normal. 
There is usually no permanent damage to the heart. Most people who have an episode of broken heart syndrome don't have another. But there is a small chance that broken heart syndrome can happen again. Sometimes the condition can lead to more serious problems such as heart failure or heart rhythm problems. Follow-up care is a key part of your treatment and safety. Be sure to make and go to all appointments, and call your doctor if you are having problems. It's also a good idea to know your test results and keep a list of the medicines you take. Where can you learn more? Go to http://rogers-marija.info/. Enter V810 in the search box to learn more about \"Learning About Broken Heart Syndrome. \" Current as of: May 2, 2016 Content Version: 11.2 © 5861-1565 iDoc24. Care instructions adapted under license by [employer] (which disclaims liability or warranty for this information). If you have questions about a medical condition or this instruction, always ask your healthcare professional. [address] any warranty or liability for your use of this information. Avoiding Triggers With Heart Failure: Care Instructions Your Care Instructions Triggers are anything that make your heart failure flare up. A flare-up is also called \"sudden heart failure\" or \"acute heart failure. \" When you have a flare-up, fluid builds up in your lungs, and you have problems breathing. You might need to go to the hospital. By watching for changes in your condition and avoiding triggers, you can prevent heart failure flare-ups. Follow-up care is a key part of your treatment and safety.  Be sure to make and go to all appointments, and call your doctor if you are having problems. It's also a good idea to know your test results and keep a list of the medicines you take. How can you care for yourself at home? Watch for changes in your weight and condition · Weigh yourself without clothing at the same time each day. Record your weight. Call your doctor if you gain 3 pounds or more in 2 to 3 days. A sudden weight gain may mean that your heart failure is getting worse. · Keep a daily record of your symptoms. Write down any changes in how you feel, such as new shortness of breath, cough, or problems eating. Also record if your ankles are more swollen than usual and if you have to urinate in the night more often. Note anything that you ate or did that could have triggered these changes. Limit sodium Sodium causes your body to hold on to extra water. This may cause your heart failure symptoms to get worse. People get most of their sodium from processed foods. Fast food and restaurant meals also tend to be very high in sodium. · Your doctor may suggest that you limit sodium to 2,000 milligrams (mg) a day or less. That is less than 1 teaspoon of salt a day, including all the salt you eat in cooking or in packaged foods. · Read food labels on cans and food packages. They tell you how much sodium you get in one serving. Check the serving size. If you eat more than one serving, you are getting more sodium. · Be aware that sodium can come in forms other than salt, including monosodium glutamate (MSG), sodium citrate, and sodium bicarbonate (baking soda). MSG is often added to Asian food. You can sometimes ask for food without MSG or salt. · Slowly reducing salt will help you adjust to the taste. Take the salt shaker off the table. · Flavor your food with garlic, lemon juice, onion, vinegar, herbs, and spices instead of salt.  Do not use soy sauce, steak sauce, onion salt, garlic salt, mustard, or ketchup on your food, unless it is labeled \"low-sodium\" or \"low-salt. \" 
· Make your own salad dressings, sauces, and ketchup without adding salt. · Use fresh or frozen ingredients, instead of canned ones, whenever you can. Choose low-sodium canned goods. · Eat less processed food and food from restaurants, including fast food. Exercise as directed Moderate, regular exercise is very good for your heart. It improves your blood flow and helps control your weight. But too much exercise can stress your heart and cause a heart failure flare-up. · Check with your doctor before you start an exercise program. 
· Walking is an easy way to get exercise. Start out slowly. Gradually increase the length and pace of your walk. Swimming, riding a bike, and using a treadmill are also good forms of exercise. · When you exercise, watch for signs that your heart is working too hard. You are pushing yourself too hard if you cannot talk while you are exercising. If you become short of breath or dizzy or have chest pain, stop, sit down, and rest. 
· Do not exercise when you do not feel well. Take medicines correctly · Take your medicines exactly as prescribed. Call your doctor if you think you are having a problem with your medicine. · Make a list of all the medicines you take. Include those prescribed to you by other doctors and any over-the-counter medicines, vitamins, or supplements you take. Take this list with you when you go to any doctor. · Take your medicines at the same time every day. It may help you to post a list of all the medicines you take every day and what time of day you take them. · Make taking your medicine as simple as you can. Plan times to take your medicines when you are doing other things, such as eating a meal or getting ready for bed. This will make it easier to remember to take your medicines. · Get organized. Use helpful tools, such as daily or weekly pill containers. When should you call for help? Call 911 if you have symptoms of sudden heart failure such as: 
· You have severe trouble breathing. · You cough up pink, foamy mucus. · You have a new irregular or rapid heartbeat. Call your doctor now or seek immediate medical care if: 
· You have new or increased shortness of breath. · You are dizzy or lightheaded, or you feel like you may faint. · You have sudden weight gain, such as 3 pounds or more in 2 to 3 days. · You have increased swelling in your legs, ankles, or feet. · You are suddenly so tired or weak that you cannot do your usual activities. Watch closely for changes in your health, and be sure to contact your doctor if you develop new symptoms. Where can you learn more? Go to http://rogers-marija.info/. Enter P006 in the search box to learn more about \"Avoiding Triggers With Heart Failure: Care Instructions. \" Current as of: November 15, 2016 Content Version: 11.2 © 5169-6602 Teikon. Care instructions adapted under license by [employer] (which disclaims liability or warranty for this information). If you have questions about a medical condition or this instruction, always ask your healthcare professional. [place] [address] any warranty or liability for your use of this information. DISCHARGE SUMMARY from Nurse The following personal items are in your possession at time of discharge: 
 
Dental Appliances: None Visual Aid: With patient Home Medications: None Jewelry: None Clothing: None Other Valuables: None Personal Items Sent to Safe: no PATIENT INSTRUCTIONS: 
 
 
F-face looks uneven A-arms unable to move or move unevenly S-speech slurred or non-existent T-time-call 911 as soon as signs and symptoms begin-DO NOT go Back to bed or wait to see if you get better-TIME IS BRAIN. Warning Signs of HEART ATTACK Call 911 if you have these symptoms: 
? Chest discomfort. Most heart attacks involve discomfort in the center of the chest that lasts more than a few minutes, or that goes away and comes back. It can feel like uncomfortable pressure, squeezing, fullness, or pain. ? Discomfort in other areas of the upper body. Symptoms can include pain or discomfort in one or both arms, the back, neck, jaw, or stomach. ? Shortness of breath with or without chest discomfort. ? Other signs may include breaking out in a cold sweat, nausea, or lightheadedness. Don't wait more than five minutes to call [address]! Fast action can save your life. Calling 911 is almost always the fastest way to get lifesaving treatment. Emergency Medical Services staff can begin treatment when they arrive  up to an hour sooner than if someone gets to the hospital by car. The discharge information has been reviewed with the patient. The patient verbalized understanding. Discharge medications reviewed with the patient and appropriate educational materials and side effects teaching were provided. Discharge Instructions Attachments/References CARDIOMYOPATHY: TAKOTSUBO/STRESS (BROKEN HEART SYNDROME): GENERAL INFO (ENGLISH) Discharge Orders None [facility] Announcement We are excited to announce that we are making your provider's discharge notes available to you in Process Relations. You will see these notes when they are completed and signed by the physician that discharged you from your recent hospital stay.   If you have any questions or concerns about any information you see in Process Relations, please call the [employer] Department where you were seen or reach out to your Primary Care Provider for more information about your plan of care. Introducing [facility]! Hany Montgomery introduces [employer] patient portal. Now you can access parts of your medical record, email your doctor's office, and request medication refills online. 1. In your internet browser, go to [email or web address] 2. Click on the First Time User? Click Here link in the Sign In box. You will see the New Member Sign Up page. 3. Enter your [employer] Access Code exactly as it appears below. You will not need to use this code after youve completed the sign-up process. If you do not sign up before the expiration date, you must request a new code. · [employer] Access Code: [email or web address] Expires: 8/28/2017  8:56 AM 
 
4. Enter the last four digits of your Social Security Number (xxxx) and Date of Birth (mm/dd/yyyy) as indicated and click Submit. You will be taken to the next sign-up page. 5. Create a [employer] ID. This will be your [employer] login ID and cannot be changed, so think of one that is secure and easy to remember. 6. Create a [employer] password. You can change your password at any time. 7. Enter your Password Reset Question and Answer. This can be used at a later time if you forget your password. 8. Enter your e-mail address. You will receive e-mail notification when new information is available in [address] 9. Click Sign Up. You can now view and download portions of your medical record. 10. Click the Download Summary menu link to download a portable copy of your medical information. If you have questions, please visit the Frequently Asked Questions section of the [employer] website. Remember, [employer] is NOT to be used for urgent needs. For medical emergencies, dial 911. Now available from your iPhone and Android! General Information Please provide this summary of care documentation to your next provider. Patient Signature:  ____________________________________________________________ Date:  ____________________________________________________________  
  
Neal Jorge Provider Signature:  ____________________________________________________________ Date:  ____________________________________________________________ More Information Learning About Broken Heart Syndrome What is broken heart syndrome? With broken heart syndrome, the heart has trouble pumping blood normally. A chamber of the heart swells up like a small balloon. Broken heart syndrome is also called stress-induced cardiomyopathy or takotsubo cardiomyopathy (say \"VNPN-au-jzx-[facility] Cent[facility]-jsp-df-sj-PQV-az-thee\"). Broken heart syndrome causes the same symptoms as a heart attack, but it's not a heart attack. Some of the most common symptoms are: 
· Sudden chest pain. · Shortness of breath. · Fainting. Other symptoms may include: · A pounding or fast heartbeat. · Nausea. · Vomiting. A heart attack happens when one or more of the coronary arteries is blocked. These arteries supply the heart muscle with blood. When blood flow is blocked, part of the heart muscle may be permanently damaged. But in broken heart syndrome, the arteries are not blocked. There is usually no permanent damage to the heart. Broken heart syndrome is often triggered by great emotional stress, such as grief after losing a loved one. It can also be triggered by physical stress, such as being in the hospital for surgery. Sometimes it's not known what triggers broken heart syndrome. How is broken heart syndrome diagnosed? Because symptoms are the same as a heart attack, you probably had tests to make sure you did not have a heart attack. These tests include: · Blood tests, to look for damage to the heart muscle. · Imaging tests such as an X-ray or an echocardiogram (ultrasound). These tests can show if a heart chamber has swelled up. They can also show if your heart is pumping normally. · An electrocardiogram (EKG), to measure your heart's electrical activity. · A cardiac catheterization. Results from the other tests may have looked like you had a heart attack. If so, you probably had a cardiac catheterization. This test lets your doctor look at the coronary arteries that supply blood to your heart. It helps to confirm that your coronary arteries are not blocked and that you did not have a heart attack. How is it treated? You may be in intensive care for a short time. You may stay in the hospital for a few days. After you leave the hospital, you may have some more tests. These tests are to check how well your heart is pumping blood. You will likely take medicines for a short time to help your heart muscle recover. These may include medicines that make it easier for your heart to pump blood. Some people may need to take medicines long-term. What can you expect when you have broken heart syndrome? In most people, the heart starts pumping normally again within a few days or weeks. For some people, it can take several months to return to normal. 
There is usually no permanent damage to the heart. Most people who have an episode of broken heart syndrome don't have another. But there is a small chance that broken heart syndrome can happen again. Sometimes the condition can lead to more serious problems such as heart failure or heart rhythm problems. Follow-up care is a key part of your treatment and safety. Be sure to make and go to all appointments, and call your doctor if you are having problems. It's also a good idea to know your test results and keep a list of the medicines you take. Where can you learn more? Go to http://rogers-marija.info/. Enter V810 in the search box to learn more about \"Learning About Broken Heart Syndrome. \" Current as of: May 2, 2016 Content Version: 11.2 © 1754-8782 [employer]i [employer], Patient Home Monitoring. Care instructions adapted under license by [employer] (which disclaims liability or warranty for this information). If you have questions about a medical condition or this instruction, always ask your healthcare professional. [place] [address] any warranty or liability for your use of this information.

## 2017-05-28 LAB
APTT PPP: 51.6 SEC (ref 23.5–31.7)
APTT PPP: 69.5 SEC (ref 23.5–31.7)
APTT PPP: 72.5 SEC (ref 23.5–31.7)
CHOLEST SERPL-MCNC: 180 MG/DL
HDLC SERPL-MCNC: 59 MG/DL (ref 40–60)
HDLC SERPL: 3.1 {RATIO}
LDLC SERPL CALC-MCNC: 106.8 MG/DL
LIPID PROFILE,FLP: ABNORMAL
TRIGL SERPL-MCNC: 71 MG/DL (ref 35–150)
TROPONIN I SERPL-MCNC: 0.12 NG/ML (ref 0.02–0.05)
VLDLC SERPL CALC-MCNC: 14.2 MG/DL (ref 6–23)

## 2017-05-28 PROCEDURE — 74011250637 HC RX REV CODE- 250/637: Performed by: PHYSICIAN ASSISTANT

## 2017-05-28 PROCEDURE — 84484 ASSAY OF TROPONIN QUANT: CPT | Performed by: PHYSICIAN ASSISTANT

## 2017-05-28 PROCEDURE — 85730 THROMBOPLASTIN TIME PARTIAL: CPT | Performed by: INTERNAL MEDICINE

## 2017-05-28 PROCEDURE — 74011000250 HC RX REV CODE- 250: Performed by: INTERNAL MEDICINE

## 2017-05-28 PROCEDURE — 74011250636 HC RX REV CODE- 250/636: Performed by: PHYSICIAN ASSISTANT

## 2017-05-28 PROCEDURE — C8929 TTE W OR WO FOL WCON,DOPPLER: HCPCS

## 2017-05-28 PROCEDURE — 65660000000 HC RM CCU STEPDOWN

## 2017-05-28 PROCEDURE — 74011250636 HC RX REV CODE- 250/636: Performed by: INTERNAL MEDICINE

## 2017-05-28 PROCEDURE — 36415 COLL VENOUS BLD VENIPUNCTURE: CPT | Performed by: PHYSICIAN ASSISTANT

## 2017-05-28 PROCEDURE — 80061 LIPID PANEL: CPT | Performed by: PHYSICIAN ASSISTANT

## 2017-05-28 RX ORDER — HEPARIN SODIUM 5000 [USP'U]/ML
35 INJECTION, SOLUTION INTRAVENOUS; SUBCUTANEOUS ONCE
Status: COMPLETED | OUTPATIENT
Start: 2017-05-28 | End: 2017-05-28

## 2017-05-28 RX ADMIN — LISINOPRIL 5 MG: 5 TABLET ORAL at 08:52

## 2017-05-28 RX ADMIN — METOPROLOL SUCCINATE 25 MG: 25 TABLET, EXTENDED RELEASE ORAL at 08:52

## 2017-05-28 RX ADMIN — PANTOPRAZOLE SODIUM 40 MG: 40 TABLET, DELAYED RELEASE ORAL at 08:52

## 2017-05-28 RX ADMIN — MONTELUKAST SODIUM 10 MG: 10 TABLET, FILM COATED ORAL at 08:52

## 2017-05-28 RX ADMIN — HEPARIN SODIUM AND DEXTROSE 14 UNITS/KG/HR: 5000; 5 INJECTION INTRAVENOUS at 18:59

## 2017-05-28 RX ADMIN — ACETAMINOPHEN 650 MG: 325 TABLET ORAL at 15:36

## 2017-05-28 RX ADMIN — ALPRAZOLAM 0.5 MG: 0.5 TABLET ORAL at 15:38

## 2017-05-28 RX ADMIN — HEPARIN SODIUM 3150 UNITS: 5000 INJECTION, SOLUTION INTRAVENOUS; SUBCUTANEOUS at 11:58

## 2017-05-28 RX ADMIN — HEPARIN SODIUM AND DEXTROSE 14 UNITS/KG/HR: 5000; 5 INJECTION INTRAVENOUS at 15:36

## 2017-05-28 RX ADMIN — PERFLUTREN 1 ML: 6.52 INJECTION, SUSPENSION INTRAVENOUS at 10:00

## 2017-05-28 RX ADMIN — POTASSIUM CHLORIDE 10 MEQ: 10 TABLET, EXTENDED RELEASE ORAL at 08:52

## 2017-05-28 RX ADMIN — ASPIRIN 81 MG 81 MG: 81 TABLET ORAL at 08:51

## 2017-05-28 NOTE — PROGRESS NOTES
Verbal bedside report given to oncoming RN, Argenis Daniels. Patient's situation, background, assessment and recommendations provided. Opportunity for questions provided. Oncoming RN assumed care of patient.

## 2017-05-28 NOTE — PROGRESS NOTES
[facility] CARDIOLOGY PROGRESS NOTE           5/28/2017 8:29 AM    Admit Date: 5/27/2017    Admit Diagnosis: NSTEMI (non-ST elevated myocardial infarction) ([address]      Subjective:   No complaints this AM, no chest pain or shortness of breath    Interval History: (History of pertinent interval events obtained from nursing staff)  No events overnight    ROS:  GEN:  No fever or chills  Cardiovascular:  As noted above  Pulmonary:  As noted above  Neuro:  No new focal motor or sensory loss      Objective:     Vitals:    05/27/17 1812 05/27/17 2025 05/28/17 0115 05/28/17 0449   BP: 104/55 107/56 100/54 100/62   Pulse: 87 79 63 73   Resp: 16 18 18 16   Temp: 97.4 °F (36.3 °C) 97.6 °F (36.4 °C) 98 °F (36.7 °C) 97.5 °F (36.4 °C)   SpO2: 93% 98% 98% 99%   Weight:    90 kg (198 lb 8 oz)   Height:           Physical Exam:  General-Well Developed, Well Nourished, No Acute Distress, Alert & Oriented x 3, appropriate mood. Neck- supple, no JVD  CV- irreg irreg, distant S1, S2  Lung- clear bilaterally  Abd- soft, nontender, nondistended  Ext- no edema bilaterally.   Skin- warm and dry    Current Facility-Administered Medications   Medication Dose Route Frequency    nitroglycerin (NITROSTAT) tablet 0.4 mg  0.4 mg SubLINGual Q5MIN PRN    aspirin chewable tablet 81 mg  81 mg Oral DAILY    fluticasone (FLONASE) 50 mcg/actuation nasal spray 2 Spray  2 Spray Both Nostrils DAILY    lisinopril (PRINIVIL, ZESTRIL) tablet 5 mg  5 mg Oral DAILY    metoprolol succinate (TOPROL-XL) XL tablet 25 mg  25 mg Oral DAILY    montelukast (SINGULAIR) tablet 10 mg  10 mg Oral DAILY    nabumetone (RELAFEN)  750 mg tablet - PATIENT Supplied  750 mg Oral DAILY    naltrexone-bupropion (CONTRAVE) 8-90 mg ER  1 tab - PATIENT SUPPLIED  1 Tab Oral BID    pantoprazole (PROTONIX) tablet 40 mg  40 mg Oral ACB    potassium chloride (KLOR-CON) tablet 10 mEq  10 mEq Oral DAILY    sodium chloride (NS) flush 5-10 mL  5-10 mL IntraVENous PRN  nitroglycerin (NITROSTAT) tablet 0.4 mg  0.4 mg SubLINGual Q5MIN PRN    acetaminophen (TYLENOL) tablet 650 mg  650 mg Oral Q4H PRN    heparin 25,000 units in dextrose 500 mL infusion  12-25 Units/kg/hr IntraVENous TITRATE    ALPRAZolam (XANAX) tablet 0.5 mg  0.5 mg Oral QID PRN    zolpidem (AMBIEN) tablet 5 mg  5 mg Oral QHS PRN    diphenhydrAMINE (BENADRYL) capsule 25 mg  25 mg Oral Q6H PRN     Data Review:   Recent Results (from the past 24 hour(s))   EKG, 12 LEAD, INITIAL    Collection Time: 05/27/17  3:19 PM   Result Value Ref Range    Ventricular Rate 78 BPM    Atrial Rate 72 BPM    QRS Duration 114 ms    Q-T Interval 446 ms    QTC Calculation (Bezet) 508 ms    Calculated R Axis -46 degrees    Calculated T Axis -71 degrees    Diagnosis       Atrial fibrillation  Left anterior fascicular block  Minimal voltage criteria for LVH, may be normal variant  Possible Anterior infarct , age undetermined  T wave abnormality, consider inferolateral ischemia  Prolonged QT  No previous ECGs available  Confirmed by JULIANN LARSEN ([id number]) on 5/27/2017 7:50:41 PM     CBC WITH AUTOMATED DIFF    Collection Time: 05/27/17  3:25 PM   Result Value Ref Range    WBC 10.3 4.3 - 11.1 K/uL    RBC 3.92 (L) 4.05 - 5.25 M/uL    HGB 11.4 (L) 11.7 - 15.4 g/dL    HCT 34.6 (L) 35.8 - 46.3 %    MCV 88.3 79.6 - 97.8 FL    MCH 29.1 26.1 - 32.9 PG    MCHC 32.9 31.4 - 35.0 g/dL    RDW 13.7 11.9 - 14.6 %    PLATELET 671 111 - 462 K/uL    MPV 10.4 (L) 10.8 - 14.1 FL    DF AUTOMATED      NEUTROPHILS 60 43 - 78 %    LYMPHOCYTES 22 13 - 44 %    MONOCYTES 17 (H) 4.0 - 12.0 %    EOSINOPHILS 1 0.5 - 7.8 %    BASOPHILS 0 0.0 - 2.0 %    IMMATURE GRANULOCYTES 0.4 0.0 - 5.0 %    ABS. NEUTROPHILS 6.1 1.7 - 8.2 K/UL    ABS. LYMPHOCYTES 2.3 0.5 - 4.6 K/UL    ABS. MONOCYTES 1.7 (H) 0.1 - 1.3 K/UL    ABS. EOSINOPHILS 0.1 0.0 - 0.8 K/UL    ABS. BASOPHILS 0.0 0.0 - 0.2 K/UL    ABS. IMM.  GRANS. 0.0 0.0 - 0.5 K/UL   METABOLIC PANEL, COMPREHENSIVE Collection Time: 05/27/17  3:25 PM   Result Value Ref Range    Sodium 136 136 - 145 mmol/L    Potassium 3.9 3.5 - 5.1 mmol/L    Chloride 100 98 - 107 mmol/L    CO2 28 21 - 32 mmol/L    Anion gap 8 7 - 16 mmol/L    Glucose 104 (H) 65 - 100 mg/dL    BUN 23 8 - 23 MG/DL    Creatinine 1.03 (H) 0.6 - 1.0 MG/DL    GFR est AA >60 >60 ml/min/1.73m2    GFR est non-AA 56 (L) >60 ml/min/1.73m2    Calcium 8.8 8.3 - 10.4 MG/DL    Bilirubin, total 0.8 0.2 - 1.1 MG/DL    ALT (SGPT) 35 12 - 65 U/L    AST (SGOT) 25 15 - 37 U/L    Alk.  phosphatase 82 50 - 136 U/L    Protein, total 7.1 6.3 - 8.2 g/dL    Albumin 3.2 3.2 - 4.6 g/dL    Globulin 3.9 (H) 2.3 - 3.5 g/dL    A-G Ratio 0.8 (L) 1.2 - 3.5     TROPONIN I    Collection Time: 05/27/17  3:25 PM   Result Value Ref Range    Troponin-I, Qt. 0.20 (HH) 0.02 - 0.05 NG/ML   BNP    Collection Time: 05/27/17  3:25 PM   Result Value Ref Range     pg/mL   TSH 3RD GENERATION    Collection Time: 05/27/17  3:25 PM   Result Value Ref Range    TSH 0.785 0.358 - 3.740 uIU/mL   D DIMER    Collection Time: 05/27/17  3:25 PM   Result Value Ref Range    D DIMER 0.59 (HH) <0.55 ug/ml(FEU)   TROPONIN I    Collection Time: 05/27/17  9:30 PM   Result Value Ref Range    Troponin-I, Qt. 0.16 (HH) 0.02 - 0.05 NG/ML   HEMOGLOBIN A1C    Collection Time: 05/27/17  9:30 PM   Result Value Ref Range    Hemoglobin A1c 5.6 4.8 - 6.0 %    Est. average glucose 114 mg/dL   LIPID PANEL    Collection Time: 05/28/17  1:55 AM   Result Value Ref Range    LIPID PROFILE          Cholesterol, total 180 <200 MG/DL    Triglyceride 71 35 - 150 MG/DL    HDL Cholesterol 59 40 - 60 MG/DL    LDL, calculated 106.8 (H) <100 MG/DL    VLDL, calculated 14.2 6.0 - 23.0 MG/DL    CHOL/HDL Ratio 3.1     TROPONIN I    Collection Time: 05/28/17  1:55 AM   Result Value Ref Range    Troponin-I, Qt. 0.12 (HH) 0.02 - 0.05 NG/ML   PTT    Collection Time: 05/28/17  1:55 AM   Result Value Ref Range    aPTT 69.5 (H) 23.5 - 31.7 SEC       EKG: (EKG has been independently visualized by me with interpretation below)  Assessment:     Active Problems:    Hypertension (6/00/0634)      Systolic CHF, chronic (HCC) (9/27/2016)      Chronic fatigue (9/27/2016)      Paroxysmal atrial fibrillation ([facility] (55/02/6578)      Diastolic CHF, chronic ([facility] (5/27/2017)      Plan:   1. Elevated troponin: no rise and fall, recent LHC without significant disease, plan for TTE to rule out Takotsubo cardiomyopathy, may require further testing pending above work up, on ASA, ACE, BB  2. Afib: cont heparin drip at this time, will restart eliquis after work up  3. Chronic CHF: stable, cont OMT  4. PPx: heparin drip    Yariel Jane MD  Cardiology/Electrophysiology

## 2017-05-29 LAB
APTT PPP: 66.8 SEC (ref 23.5–31.7)
ATRIAL RATE: 357 BPM
CALCULATED R AXIS, ECG10: -40 DEGREES
CALCULATED T AXIS, ECG11: -136 DEGREES
DIAGNOSIS, 93000: NORMAL
Q-T INTERVAL, ECG07: 462 MS
QRS DURATION, ECG06: 108 MS
QTC CALCULATION (BEZET), ECG08: 491 MS
VENTRICULAR RATE, ECG03: 68 BPM

## 2017-05-29 PROCEDURE — 85730 THROMBOPLASTIN TIME PARTIAL: CPT | Performed by: INTERNAL MEDICINE

## 2017-05-29 PROCEDURE — 74011250637 HC RX REV CODE- 250/637: Performed by: INTERNAL MEDICINE

## 2017-05-29 PROCEDURE — 36415 COLL VENOUS BLD VENIPUNCTURE: CPT | Performed by: INTERNAL MEDICINE

## 2017-05-29 PROCEDURE — 65660000000 HC RM CCU STEPDOWN

## 2017-05-29 PROCEDURE — 93005 ELECTROCARDIOGRAM TRACING: CPT | Performed by: INTERNAL MEDICINE

## 2017-05-29 PROCEDURE — 74011250637 HC RX REV CODE- 250/637: Performed by: PHYSICIAN ASSISTANT

## 2017-05-29 RX ORDER — CARVEDILOL 6.25 MG/1
6.25 TABLET ORAL 2 TIMES DAILY WITH MEALS
Status: DISCONTINUED | OUTPATIENT
Start: 2017-05-29 | End: 2017-05-30 | Stop reason: HOSPADM

## 2017-05-29 RX ADMIN — CARVEDILOL 6.25 MG: 6.25 TABLET, FILM COATED ORAL at 17:14

## 2017-05-29 RX ADMIN — ACETAMINOPHEN 650 MG: 325 TABLET ORAL at 15:49

## 2017-05-29 RX ADMIN — ALPRAZOLAM 0.5 MG: 0.5 TABLET ORAL at 08:53

## 2017-05-29 RX ADMIN — POTASSIUM CHLORIDE 10 MEQ: 10 TABLET, EXTENDED RELEASE ORAL at 08:49

## 2017-05-29 RX ADMIN — APIXABAN 5 MG: 5 TABLET, FILM COATED ORAL at 17:09

## 2017-05-29 RX ADMIN — ACETAMINOPHEN 650 MG: 325 TABLET ORAL at 04:45

## 2017-05-29 RX ADMIN — PANTOPRAZOLE SODIUM 40 MG: 40 TABLET, DELAYED RELEASE ORAL at 05:47

## 2017-05-29 RX ADMIN — LISINOPRIL 5 MG: 5 TABLET ORAL at 08:49

## 2017-05-29 RX ADMIN — ACETAMINOPHEN 650 MG: 325 TABLET ORAL at 19:57

## 2017-05-29 RX ADMIN — APIXABAN 5 MG: 5 TABLET, FILM COATED ORAL at 08:49

## 2017-05-29 RX ADMIN — CARVEDILOL 6.25 MG: 6.25 TABLET, FILM COATED ORAL at 08:49

## 2017-05-29 RX ADMIN — ALPRAZOLAM 0.5 MG: 0.5 TABLET ORAL at 15:51

## 2017-05-29 RX ADMIN — MONTELUKAST SODIUM 10 MG: 10 TABLET, FILM COATED ORAL at 08:49

## 2017-05-29 NOTE — PROGRESS NOTES
Visit with very pleasant patient. She was calm and engaged very openly with the . Patient shared that she is currently under a lot of stress from  Family issues. Patient has strong Josepha Heady maryan and reflected upon God's desire for us to give him our burdens. Patient expressed great confidence in her care team and hopes to return to health and wholeness soon. Prayer.   Signed by chaplain Rosanne
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
## 2017-05-29 NOTE — PROGRESS NOTES
Patient complains of chest pain rated 5/10. EKG ordered. Nitro and morphine not given due to patient states she is allergic to it and has bad reaction. Tylenol given for pain. 2L O2 via NC applied. Will continue to monitor patient.

## 2017-05-29 NOTE — PROGRESS NOTES
Bedside and Verbal shift change report given to Elisha Nayak RN (oncoming nurse) by Jessica Radford RN (offgoing nurse). Report included the following information SBAR, Kardex, MAR and Recent Results.

## 2017-05-29 NOTE — PROGRESS NOTES
Bedside and Verbal shift change report given to self (oncoming nurse) by Raquel García RN (offgoing nurse). Report included the following information SBAR, Kardex, MAR, Recent Results and Cardiac Rhythm a fib.

## 2017-05-29 NOTE — PROGRESS NOTES
Bedside and verbal report received from [facility], [address] [facility].

## 2017-05-29 NOTE — PROGRESS NOTES
[facility] CARDIOLOGY PROGRESS NOTE           5/29/2017 8:33 AM    Admit Date: 5/27/2017    Admit Diagnosis: NSTEMI (non-ST elevated myocardial infarction) ([address]      Subjective:   No complaints this AM, no chest pain or shortness of breath    Interval History: (History of pertinent interval events obtained from nursing staff)    ROS:  GEN:  No fever or chills  Cardiovascular:  As noted above  Pulmonary:  As noted above  Neuro:  No new focal motor or sensory loss      Objective:     Vitals:    05/29/17 0030 05/29/17 0425 05/29/17 0548 05/29/17 0715   BP: 99/62 102/63  101/56   Pulse: 71 71  62   Resp: 15 16  16   Temp: 97 °F (36.1 °C) 97 °F (36.1 °C)  97.4 °F (36.3 °C)   SpO2: 98% 97%  100%   Weight:   90.8 kg (200 lb 3.2 oz)    Height:           Physical Exam:  General-Well Developed, Well Nourished, No Acute Distress, Alert & Oriented x 3, appropriate mood. Neck- supple, no JVD  CV- regular rate and rhythm no MRG  Lung- clear bilaterally  Abd- soft, nontender, nondistended  Ext- no edema bilaterally.   Skin- warm and dry    Current Facility-Administered Medications   Medication Dose Route Frequency    carvedilol (COREG) tablet 6.25 mg  6.25 mg Oral BID WITH MEALS    apixaban (ELIQUIS) tablet 5 mg  5 mg Oral BID    nitroglycerin (NITROSTAT) tablet 0.4 mg  0.4 mg SubLINGual Q5MIN PRN    fluticasone (FLONASE) 50 mcg/actuation nasal spray 2 Spray  2 Spray Both Nostrils DAILY    lisinopril (PRINIVIL, ZESTRIL) tablet 5 mg  5 mg Oral DAILY    montelukast (SINGULAIR) tablet 10 mg  10 mg Oral DAILY    nabumetone (RELAFEN)  750 mg tablet - PATIENT Supplied  750 mg Oral DAILY    naltrexone-bupropion (CONTRAVE) 8-90 mg ER  1 tab - PATIENT SUPPLIED  1 Tab Oral BID    pantoprazole (PROTONIX) tablet 40 mg  40 mg Oral ACB    potassium chloride (KLOR-CON) tablet 10 mEq  10 mEq Oral DAILY    sodium chloride (NS) flush 5-10 mL  5-10 mL IntraVENous PRN    nitroglycerin (NITROSTAT) tablet 0.4 mg  0.4 mg SubLINGual Q5MIN PRN    acetaminophen (TYLENOL) tablet 650 mg  650 mg Oral Q4H PRN    ALPRAZolam (XANAX) tablet 0.5 mg  0.5 mg Oral QID PRN    zolpidem (AMBIEN) tablet 5 mg  5 mg Oral QHS PRN    diphenhydrAMINE (BENADRYL) capsule 25 mg  25 mg Oral Q6H PRN     Data Review:   Recent Results (from the past 24 hour(s))   PTT    Collection Time: 05/28/17  9:57 AM   Result Value Ref Range    aPTT 51.6 (H) 23.5 - 31.7 SEC   PTT    Collection Time: 05/28/17  5:53 PM   Result Value Ref Range    aPTT 72.5 (H) 23.5 - 31.7 SEC   PTT    Collection Time: 05/29/17  3:04 AM   Result Value Ref Range    aPTT 66.8 (H) 23.5 - 31.7 SEC   EKG, 12 LEAD, SUBSEQUENT    Collection Time: 05/29/17  4:40 AM   Result Value Ref Range    Ventricular Rate 68 BPM    Atrial Rate 357 BPM    QRS Duration 108 ms    Q-T Interval 462 ms    QTC Calculation (Bezet) 491 ms    Calculated R Axis -40 degrees    Calculated T Axis -136 degrees    Diagnosis       Atrial fibrillation  Left axis deviation  Incomplete left bundle branch block  T wave abnormality, consider inferior ischemia or digitalis effect  T wave abnormality, consider anterolateral ischemia or digitalis effect  Prolonged QT  Abnormal ECG  When compared with ECG of 27-MAY-2017 15:19,  Nonspecific T wave abnormality has replaced inverted T waves in Inferior   leads         EKG:  (EKG has been independently visualized by me with interpretation below)  Assessment:     Active Problems:    Hypertension (3/74/7888)      Systolic CHF, chronic (HCC) (9/27/2016)      Chronic fatigue (9/27/2016)      Paroxysmal atrial fibrillation (HCC) (80/00/7190)      Diastolic CHF, chronic ([facility] (5/27/2017)      Plan:   1. Takotsubo CM: echo consistent with already presumed Takotsubo CM, will discontinue metoprolol and start carvedilol 6.25mg Q12H, cont lisinopril, planned to set up with life vest today and probable discharge tomorrow.   2. PAF: will D/C heparin, restart eliquis and stop ASA, rate controlled AF  3. HTN: blood pressure controlled  4. PPx: joni Jane MD  Cardiology/Electrophysiology

## 2017-05-30 VITALS
WEIGHT: 202.4 LBS | HEIGHT: 63 IN | RESPIRATION RATE: 18 BRPM | SYSTOLIC BLOOD PRESSURE: 100 MMHG | BODY MASS INDEX: 35.86 KG/M2 | TEMPERATURE: 97.5 F | DIASTOLIC BLOOD PRESSURE: 65 MMHG | HEART RATE: 64 BPM | OXYGEN SATURATION: 98 %

## 2017-05-30 PROBLEM — I51.81 TAKOTSUBO CARDIOMYOPATHY: Status: ACTIVE | Noted: 2017-05-30

## 2017-05-30 PROCEDURE — 74011250637 HC RX REV CODE- 250/637: Performed by: INTERNAL MEDICINE

## 2017-05-30 PROCEDURE — 74011250637 HC RX REV CODE- 250/637: Performed by: PHYSICIAN ASSISTANT

## 2017-05-30 RX ORDER — CARVEDILOL 6.25 MG/1
6.25 TABLET ORAL 2 TIMES DAILY WITH MEALS
Qty: 60 TAB | Refills: 6 | Status: SHIPPED | OUTPATIENT
Start: 2017-05-30 | End: 2017-09-18 | Stop reason: SDUPTHER

## 2017-05-30 RX ORDER — LISINOPRIL 5 MG/1
5 TABLET ORAL DAILY
Qty: 30 TAB | Refills: 6 | Status: SHIPPED | OUTPATIENT
Start: 2017-05-30 | End: 2017-07-13 | Stop reason: SINTOL

## 2017-05-30 RX ADMIN — CARVEDILOL 6.25 MG: 6.25 TABLET, FILM COATED ORAL at 09:03

## 2017-05-30 RX ADMIN — ALPRAZOLAM 0.5 MG: 0.5 TABLET ORAL at 05:36

## 2017-05-30 RX ADMIN — PANTOPRAZOLE SODIUM 40 MG: 40 TABLET, DELAYED RELEASE ORAL at 05:36

## 2017-05-30 RX ADMIN — APIXABAN 5 MG: 5 TABLET, FILM COATED ORAL at 09:03

## 2017-05-30 RX ADMIN — POTASSIUM CHLORIDE 10 MEQ: 10 TABLET, EXTENDED RELEASE ORAL at 09:02

## 2017-05-30 RX ADMIN — LISINOPRIL 5 MG: 5 TABLET ORAL at 09:03

## 2017-05-30 RX ADMIN — MONTELUKAST SODIUM 10 MG: 10 TABLET, FILM COATED ORAL at 09:03

## 2017-05-30 RX ADMIN — ACETAMINOPHEN 650 MG: 325 TABLET ORAL at 04:55
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
## 2017-05-30 NOTE — PROGRESS NOTES
[facility] CARDIOLOGY PROGRESS NOTE           5/30/2017 7:18 AM    Admit Date: 5/27/2017      Subjective:   Slept well last night. Still with mild CP.     ROS:  Cardiovascular:  As noted above    Objective:      Vitals:    05/29/17 2048 05/30/17 0010 05/30/17 0453 05/30/17 0531   BP: (!) 88/54 (!) 88/50 116/53    Pulse: 63 63 69    Resp: 16 16 16    Temp: 97.2 °F (36.2 °C) 97.2 °F (36.2 °C) 97.2 °F (36.2 °C)    SpO2: 99% 99% 100%    Weight:    91.8 kg (202 lb 6.4 oz)   Height:    5' 3\" (1.6 m)       Physical Exam:  General-No Acute Distress  Neck- supple, no JVD  CV- regular rate and rhythm no MRG  Lung- clear bilaterally  Abd- soft, nontender, nondistended  Ext- no edema bilaterally. Skin- warm and dry    Data Review:   Recent Labs      05/28/17   0155  05/27/17   2130  05/27/17   1525   NA   --    --   136   K   --    --   3.9   BUN   --    --   23   CREA   --    --   1.03*   GLU   --    --   104*   WBC   --    --   10.3   HGB   --    --   11.4*   HCT   --    --   34.6*   PLT   --    --   244   TROIQ  0.12*  0.16*  0.20*   CHOL  180   --    --    TGL  71   --    --    LDLC  106.8*   --    --    HDL  59   --    --        Assessment/Plan:     Principal Problem:    Takotsubo cardiomyopathy (5/30/2017)--long discussion re diagnosis, need for Lifevest at least transiently. No evidence of tachycardia induced CM. Tolerating low dose coreg and lisinopril. Cath last year with no evidence of CAD. Recent stressful family event. Home today after lifevest fitting. Active Problems:    Hypertension (9/14/2016)--stable. Systolic CHF, chronic (HCC) (9/27/2016)--as above, significant reduction in EF in one weeks time. Chronic fatigue (9/27/2016)      Paroxysmal atrial fibrillation (HCC) (11/15/2016)--more persistent now, rate control and OA strategy.        Diastolic CHF, chronic ([facility] (5/27/2017)          Brian Young NP  5/30/2017 7:18 AM

## 2017-05-30 NOTE — PROGRESS NOTES
Bedside and Verbal shift change report given to Kemi Bhardwaj RN (oncoming nurse) by Misty Tan RN (offgoing nurse). Report included the following information SBAR, Kardex, MAR and Recent Results.

## 2017-05-30 NOTE — DISCHARGE INSTRUCTIONS
Learning About Broken Heart Syndrome  What is broken heart syndrome? With broken heart syndrome, the heart has trouble pumping blood normally. A chamber of the heart swells up like a small balloon. Broken heart syndrome is also called stress-induced cardiomyopathy or takotsubo cardiomyopathy (say \"Edin gutierrez-oh-ep-EKQ-oz-jose alfredo\"). Broken heart syndrome causes the same symptoms as a heart attack, but it's not a heart attack. Some of the most common symptoms are:  · Sudden chest pain. · Shortness of breath. · Fainting. Other symptoms may include:  · A pounding or fast heartbeat. · Nausea. · Vomiting. A heart attack happens when one or more of the coronary arteries is blocked. These arteries supply the heart muscle with blood. When blood flow is blocked, part of the heart muscle may be permanently damaged. But in broken heart syndrome, the arteries are not blocked. There is usually no permanent damage to the heart. Broken heart syndrome is often triggered by great emotional stress, such as grief after losing a loved one. It can also be triggered by physical stress, such as being in the hospital for surgery. Sometimes it's not known what triggers broken heart syndrome. How is broken heart syndrome diagnosed? Because symptoms are the same as a heart attack, you probably had tests to make sure you did not have a heart attack. These tests include:  · Blood tests, to look for damage to the heart muscle. · Imaging tests such as an X-ray or an echocardiogram (ultrasound). These tests can show if a heart chamber has swelled up. They can also show if your heart is pumping normally. · An electrocardiogram (EKG), to measure your heart's electrical activity. · A cardiac catheterization. Results from the other tests may have looked like you had a heart attack. If so, you probably had a cardiac catheterization. This test lets your doctor look at the coronary arteries that supply blood to your heart.  It helps to confirm that your coronary arteries are not blocked and that you did not have a heart attack. How is it treated? You may be in intensive care for a short time. You may stay in the hospital for a few days. After you leave the hospital, you may have some more tests. These tests are to check how well your heart is pumping blood. You will likely take medicines for a short time to help your heart muscle recover. These may include medicines that make it easier for your heart to pump blood. Some people may need to take medicines long-term. What can you expect when you have broken heart syndrome? In most people, the heart starts pumping normally again within a few days or weeks. For some people, it can take several months to return to normal.  There is usually no permanent damage to the heart. Most people who have an episode of broken heart syndrome don't have another. But there is a small chance that broken heart syndrome can happen again. Sometimes the condition can lead to more serious problems such as heart failure or heart rhythm problems. Follow-up care is a key part of your treatment and safety. Be sure to make and go to all appointments, and call your doctor if you are having problems. It's also a good idea to know your test results and keep a list of the medicines you take. Where can you learn more? Go to http://rogers-marija.info/. Enter V810 in the search box to learn more about \"Learning About Broken Heart Syndrome. \"  Current as of: May 2, 2016  Content Version: 11.2  © 4046-0394 [employer]. Care instructions adapted under license by [employer] (which disclaims liability or warranty for this information). If you have questions about a medical condition or this instruction, always ask your healthcare professional. [place] [address] any warranty or liability for your use of this information.     Avoiding Triggers With Heart Failure: Care Instructions  Your Care Instructions  Triggers are anything that make your heart failure flare up. A flare-up is also called \"sudden heart failure\" or \"acute heart failure. \" When you have a flare-up, fluid builds up in your lungs, and you have problems breathing. You might need to go to the hospital. By watching for changes in your condition and avoiding triggers, you can prevent heart failure flare-ups. Follow-up care is a key part of your treatment and safety. Be sure to make and go to all appointments, and call your doctor if you are having problems. It's also a good idea to know your test results and keep a list of the medicines you take. How can you care for yourself at home? Watch for changes in your weight and condition  · Weigh yourself without clothing at the same time each day. Record your weight. Call your doctor if you gain 3 pounds or more in 2 to 3 days. A sudden weight gain may mean that your heart failure is getting worse. · Keep a daily record of your symptoms. Write down any changes in how you feel, such as new shortness of breath, cough, or problems eating. Also record if your ankles are more swollen than usual and if you have to urinate in the night more often. Note anything that you ate or did that could have triggered these changes. Limit sodium  Sodium causes your body to hold on to extra water. This may cause your heart failure symptoms to get worse. People get most of their sodium from processed foods. Fast food and restaurant meals also tend to be very high in sodium. · Your doctor may suggest that you limit sodium to 2,000 milligrams (mg) a day or less. That is less than 1 teaspoon of salt a day, including all the salt you eat in cooking or in packaged foods. · Read food labels on cans and food packages. They tell you how much sodium you get in one serving. Check the serving size. If you eat more than one serving, you are getting more sodium.   · Be aware that sodium can come in forms other than salt, including monosodium glutamate (MSG), sodium citrate, and sodium bicarbonate (baking soda). MSG is often added to Asian food. You can sometimes ask for food without MSG or salt. · Slowly reducing salt will help you adjust to the taste. Take the salt shaker off the table. · Flavor your food with garlic, lemon juice, onion, vinegar, herbs, and spices instead of salt. Do not use soy sauce, steak sauce, onion salt, garlic salt, mustard, or ketchup on your food, unless it is labeled \"low-sodium\" or \"low-salt. \"  · Make your own salad dressings, sauces, and ketchup without adding salt. · Use fresh or frozen ingredients, instead of canned ones, whenever you can. Choose low-sodium canned goods. · Eat less processed food and food from restaurants, including fast food. Exercise as directed  Moderate, regular exercise is very good for your heart. It improves your blood flow and helps control your weight. But too much exercise can stress your heart and cause a heart failure flare-up. · Check with your doctor before you start an exercise program.  · Walking is an easy way to get exercise. Start out slowly. Gradually increase the length and pace of your walk. Swimming, riding a bike, and using a treadmill are also good forms of exercise. · When you exercise, watch for signs that your heart is working too hard. You are pushing yourself too hard if you cannot talk while you are exercising. If you become short of breath or dizzy or have chest pain, stop, sit down, and rest.  · Do not exercise when you do not feel well. Take medicines correctly  · Take your medicines exactly as prescribed. Call your doctor if you think you are having a problem with your medicine. · Make a list of all the medicines you take. Include those prescribed to you by other doctors and any over-the-counter medicines, vitamins, or supplements you take. Take this list with you when you go to any doctor.   · Take your medicines at the same time every day. It may help you to post a list of all the medicines you take every day and what time of day you take them. · Make taking your medicine as simple as you can. Plan times to take your medicines when you are doing other things, such as eating a meal or getting ready for bed. This will make it easier to remember to take your medicines. · Get organized. Use helpful tools, such as daily or weekly pill containers. When should you call for help? Call 911 if you have symptoms of sudden heart failure such as:  · You have severe trouble breathing. · You cough up pink, foamy mucus. · You have a new irregular or rapid heartbeat. Call your doctor now or seek immediate medical care if:  · You have new or increased shortness of breath. · You are dizzy or lightheaded, or you feel like you may faint. · You have sudden weight gain, such as 3 pounds or more in 2 to 3 days. · You have increased swelling in your legs, ankles, or feet. · You are suddenly so tired or weak that you cannot do your usual activities. Watch closely for changes in your health, and be sure to contact your doctor if you develop new symptoms. Where can you learn more? Go to http://rogers-marija.info/. Enter U851 in the search box to learn more about \"Avoiding Triggers With Heart Failure: Care Instructions. \"  Current as of: November 15, 2016  Content Version: 11.2  © 3596-9415 [employer]. Care instructions adapted under license by [employer] (which disclaims liability or warranty for this information). If you have questions about a medical condition or this instruction, always ask your healthcare professional. [place] [address] any warranty or liability for your use of this information. DISCHARGE SUMMARY from Nurse    The following personal items are in your possession at time of discharge:    Dental Appliances: None  Visual Aid:  With patient     Home Medications: None  Jewelry: None  Clothing: None  Other Valuables: None  Personal Items Sent to Safe: no    PATIENT INSTRUCTIONS:    After general anesthesia or intravenous sedation, for 24 hours or while taking prescription Narcotics:  · Limit your activities  · Do not drive and operate hazardous machinery  · Do not make important personal or business decisions  · Do  not drink alcoholic beverages  · If you have not urinated within 8 hours after discharge, please contact your surgeon on call. Report the following to your surgeon:  · Excessive pain, swelling, redness or odor of or around the surgical area  · Temperature over 100.5  · Nausea and vomiting lasting longer than 4 hours or if unable to take medications  · Any signs of decreased circulation or nerve impairment to extremity: change in color, persistent  numbness, tingling, coldness or increase pain  · Any questions    What to do at Home:  Recommended activity: Activity as tolerated    If you experience any of the following symptoms of unrelieved chest pain or increased shortness of breath, please follow up with [facility] at [phone]. *  Please give a list of your current medications to your Primary Care Provider. *  Please update this list whenever your medications are discontinued, doses are      changed, or new medications (including over-the-counter products) are added. *  Please carry medication information at all times in case of emergency situations. These are general instructions for a healthy lifestyle:    No smoking/ No tobacco products/ Avoid exposure to second hand smoke    Surgeon General's Warning:  Quitting smoking now greatly reduces serious risk to your health.     Obesity, smoking, and sedentary lifestyle greatly increases your risk for illness    A healthy diet, regular physical exercise & weight monitoring are important for maintaining a healthy lifestyle    You may be retaining fluid if you have a history of heart failure or if you experience any of the following symptoms:  Weight gain of 3 pounds or more overnight or 5 pounds in a week, increased swelling in our hands or feet or shortness of breath while lying flat in bed. Please call your doctor as soon as you notice any of these symptoms; do not wait until your next office visit. Recognize signs and symptoms of STROKE:    F-face looks uneven    A-arms unable to move or move unevenly    S-speech slurred or non-existent    T-time-call 911 as soon as signs and symptoms begin-DO NOT go       Back to bed or wait to see if you get better-TIME IS BRAIN. Warning Signs of HEART ATTACK     Call 911 if you have these symptoms:   Chest discomfort. Most heart attacks involve discomfort in the center of the chest that lasts more than a few minutes, or that goes away and comes back. It can feel like uncomfortable pressure, squeezing, fullness, or pain.  Discomfort in other areas of the upper body. Symptoms can include pain or discomfort in one or both arms, the back, neck, jaw, or stomach.  Shortness of breath with or without chest discomfort.  Other signs may include breaking out in a cold sweat, nausea, or lightheadedness. Don't wait more than five minutes to call 911 - MINUTES MATTER! Fast action can save your life. Calling 911 is almost always the fastest way to get lifesaving treatment. Emergency Medical Services staff can begin treatment when they arrive -- up to an hour sooner than if someone gets to the hospital by car. The discharge information has been reviewed with the patient. The patient verbalized understanding. Discharge medications reviewed with the patient and appropriate educational materials and side effects teaching were provided.

## 2017-05-30 NOTE — PROGRESS NOTES
Lifevest fitted. Discharge instructions reviewed with patient and . Prescriptions given for Coreg and Lisinopril and med info sheets provided for all new medications. Opportunity for questions provided. Pt voiced understanding of all discharge instructions. IV and telemetry monitor removed. Getting dressed now. Aware to call for wheelchair when she is ready for transport out.

## 2017-06-05 PROBLEM — I50.32 DIASTOLIC CHF, CHRONIC (HCC): Status: RESOLVED | Noted: 2017-05-27 | Resolved: 2017-06-05

## 2017-07-13 PROBLEM — I42.8 NICM (NONISCHEMIC CARDIOMYOPATHY) (HCC): Status: ACTIVE | Noted: 2017-07-13

## 2017-09-18 PROBLEM — I27.20 PULMONARY HYPERTENSION (HCC): Status: ACTIVE | Noted: 2017-09-18

## 2018-10-01 ENCOUNTER — HOSPITAL ENCOUNTER (OUTPATIENT)
Dept: LAB | Age: 68
Discharge: HOME | End: 2018-10-01
Payer: MEDICARE

## 2018-10-01 DIAGNOSIS — I50.22 SYSTOLIC CHF, CHRONIC (HCC): ICD-10-CM

## 2018-10-01 LAB
ALBUMIN SERPL-MCNC: 3.9 G/DL (ref 3.2–4.6)
ALBUMIN/GLOB SERPL: 1 {RATIO} (ref 1.2–3.5)
ALP SERPL-CCNC: 97 U/L (ref 50–136)
ALT SERPL-CCNC: 23 U/L (ref 12–65)
ANION GAP SERPL CALC-SCNC: 6 MMOL/L (ref 7–16)
AST SERPL-CCNC: 19 U/L (ref 15–37)
BASOPHILS # BLD: 0.1 K/UL (ref 0–0.2)
BASOPHILS NFR BLD: 1 % (ref 0–2)
BILIRUB SERPL-MCNC: 0.6 MG/DL (ref 0.2–1.1)
BNP SERPL-MCNC: 126 PG/ML
BUN SERPL-MCNC: 17 MG/DL (ref 8–23)
CALCIUM SERPL-MCNC: 9.3 MG/DL (ref 8.3–10.4)
CHLORIDE SERPL-SCNC: 96 MMOL/L (ref 98–107)
CO2 SERPL-SCNC: 31 MMOL/L (ref 21–32)
CREAT SERPL-MCNC: 1.13 MG/DL (ref 0.6–1)
DIFFERENTIAL METHOD BLD: NORMAL
EOSINOPHIL # BLD: 0.2 K/UL (ref 0–0.8)
EOSINOPHIL NFR BLD: 3 % (ref 0.5–7.8)
ERYTHROCYTE [DISTWIDTH] IN BLOOD BY AUTOMATED COUNT: 13 %
GLOBULIN SER CALC-MCNC: 4 G/DL (ref 2.3–3.5)
GLUCOSE SERPL-MCNC: 91 MG/DL (ref 65–100)
HCT VFR BLD AUTO: 39.9 % (ref 35.8–46.3)
HGB BLD-MCNC: 12.6 G/DL (ref 11.7–15.4)
IMM GRANULOCYTES # BLD: 0 K/UL (ref 0–0.5)
IMM GRANULOCYTES NFR BLD AUTO: 1 % (ref 0–5)
LYMPHOCYTES # BLD: 2.3 K/UL (ref 0.5–4.6)
LYMPHOCYTES NFR BLD: 37 % (ref 13–44)
MAGNESIUM SERPL-MCNC: 2.1 MG/DL (ref 1.8–2.4)
MCH RBC QN AUTO: 28.4 PG (ref 26.1–32.9)
MCHC RBC AUTO-ENTMCNC: 31.6 G/DL (ref 31.4–35)
MCV RBC AUTO: 90.1 FL (ref 79.6–97.8)
MONOCYTES # BLD: 0.7 K/UL (ref 0.1–1.3)
MONOCYTES NFR BLD: 11 % (ref 4–12)
NEUTS SEG # BLD: 3.1 K/UL (ref 1.7–8.2)
NEUTS SEG NFR BLD: 49 % (ref 43–78)
NRBC # BLD: 0 K/UL (ref 0–0.2)
PLATELET # BLD AUTO: 267 K/UL (ref 150–450)
PMV BLD AUTO: 9.8 FL (ref 9.4–12.3)
POTASSIUM SERPL-SCNC: 4.3 MMOL/L (ref 3.5–5.1)
PROT SERPL-MCNC: 7.9 G/DL (ref 6.3–8.2)
RBC # BLD AUTO: 4.43 M/UL (ref 4.05–5.2)
SODIUM SERPL-SCNC: 133 MMOL/L (ref 136–145)
TSH SERPL DL<=0.005 MIU/L-ACNC: 1.06 UIU/ML (ref 0.36–3.74)
WBC # BLD AUTO: 6.3 K/UL (ref 4.3–11.1)

## 2018-10-01 PROCEDURE — 84443 ASSAY THYROID STIM HORMONE: CPT

## 2018-10-01 PROCEDURE — 36415 COLL VENOUS BLD VENIPUNCTURE: CPT

## 2018-10-01 PROCEDURE — 83735 ASSAY OF MAGNESIUM: CPT

## 2018-10-01 PROCEDURE — 80053 COMPREHEN METABOLIC PANEL: CPT

## 2018-10-01 PROCEDURE — 83880 ASSAY OF NATRIURETIC PEPTIDE: CPT

## 2018-10-01 PROCEDURE — 85025 COMPLETE CBC W/AUTO DIFF WBC: CPT

## 2018-10-01 NOTE — PROGRESS NOTES
I called and informed the patient of lab results and Dr. Danay Mayo response. Patient currently taking lasix daily and feeling good, no issues.

## 2018-11-07 ENCOUNTER — HOSPITAL ENCOUNTER (OUTPATIENT)
Dept: GENERAL RADIOLOGY | Age: 68
Discharge: HOME | End: 2018-11-07
Payer: MEDICARE

## 2018-11-07 DIAGNOSIS — R06.09 DOE (DYSPNEA ON EXERTION): ICD-10-CM

## 2018-11-07 PROBLEM — R06.83 SNORING: Status: ACTIVE | Noted: 2018-11-07

## 2018-11-07 PROBLEM — E66.01 SEVERE OBESITY (HCC): Status: ACTIVE | Noted: 2018-11-07

## 2018-11-07 PROCEDURE — 71046 X-RAY EXAM CHEST 2 VIEWS: CPT

## 2018-11-14 ENCOUNTER — HOSPITAL ENCOUNTER (OUTPATIENT)
Dept: NUCLEAR MEDICINE | Age: 68
Discharge: HOME | End: 2018-11-14
Attending: INTERNAL MEDICINE
Payer: MEDICARE

## 2018-11-14 ENCOUNTER — HOSPITAL ENCOUNTER (OUTPATIENT)
Dept: GENERAL RADIOLOGY | Age: 68
Discharge: HOME | End: 2018-11-14
Attending: INTERNAL MEDICINE
Payer: MEDICARE

## 2018-11-14 DIAGNOSIS — I27.20 PULMONARY HTN (HCC): ICD-10-CM

## 2018-11-14 PROCEDURE — A9567 TECHNETIUM TC-99M AEROSOL: HCPCS

## 2018-11-14 PROCEDURE — 71046 X-RAY EXAM CHEST 2 VIEWS: CPT

## 2018-11-15 NOTE — PROGRESS NOTES
Please let the patient know her VQ scan and CXR are normal. We can review them again at her follow up.  Still pending are her sleep study and 6 minute walk test.    Tony Yuan

## 2018-11-28 ENCOUNTER — HOSPITAL ENCOUNTER (OUTPATIENT)
Dept: SLEEP MEDICINE | Age: 68
Discharge: HOME | End: 2018-11-28
Payer: MEDICARE

## 2018-11-28 PROCEDURE — 95810 POLYSOM 6/> YRS 4/> PARAM: CPT

## 2019-01-29 ENCOUNTER — HOSPITAL ENCOUNTER (OUTPATIENT)
Dept: LAB | Age: 69
Discharge: HOME | End: 2019-01-29
Payer: MEDICARE

## 2019-01-29 DIAGNOSIS — I50.22 SYSTOLIC CHF, CHRONIC (HCC): ICD-10-CM

## 2019-01-29 LAB
ANION GAP SERPL CALC-SCNC: 7 MMOL/L (ref 7–16)
BNP SERPL-MCNC: 119 PG/ML
BUN SERPL-MCNC: 18 MG/DL (ref 8–23)
CALCIUM SERPL-MCNC: 9.5 MG/DL (ref 8.3–10.4)
CHLORIDE SERPL-SCNC: 99 MMOL/L (ref 98–107)
CO2 SERPL-SCNC: 31 MMOL/L (ref 21–32)
CREAT SERPL-MCNC: 1.01 MG/DL (ref 0.6–1)
GLUCOSE SERPL-MCNC: 95 MG/DL (ref 65–100)
POTASSIUM SERPL-SCNC: 4.1 MMOL/L (ref 3.5–5.1)
SODIUM SERPL-SCNC: 137 MMOL/L (ref 136–145)

## 2019-01-29 PROCEDURE — 80048 BASIC METABOLIC PNL TOTAL CA: CPT

## 2019-01-29 PROCEDURE — 36415 COLL VENOUS BLD VENIPUNCTURE: CPT

## 2019-01-29 PROCEDURE — 83880 ASSAY OF NATRIURETIC PEPTIDE: CPT

## 2021-08-02 NOTE — PROGRESS NOTES
Patient pre-assessment complete for PREETHI scheduled for 0900, arrival time 0800. Patient verified using . Patient instructed to bring all home medications in labeled bottles on the day of procedure. NPO status reinforced. Patient instructed to HOLD all mediations except Eliquis. Instructed they can take all other medications excluding vitamins & supplements. Patient verbalizes understanding of all instructions & denies any questions at this time.

## 2021-08-03 ENCOUNTER — HOSPITAL ENCOUNTER (OUTPATIENT)
Dept: CARDIAC CATH/INVASIVE PROCEDURES | Age: 71
Discharge: HOME | End: 2021-08-03
Attending: INTERNAL MEDICINE | Admitting: INTERNAL MEDICINE
Payer: MEDICARE

## 2021-08-03 VITALS
WEIGHT: 230 LBS | DIASTOLIC BLOOD PRESSURE: 83 MMHG | OXYGEN SATURATION: 100 % | BODY MASS INDEX: 40.75 KG/M2 | RESPIRATION RATE: 18 BRPM | HEIGHT: 63 IN | SYSTOLIC BLOOD PRESSURE: 109 MMHG | HEART RATE: 75 BPM

## 2021-08-03 DIAGNOSIS — I48.91 ATRIAL FIBRILLATION, UNSPECIFIED TYPE (HCC): ICD-10-CM

## 2021-08-03 LAB
ALBUMIN SERPL-MCNC: 3.4 G/DL (ref 3.2–4.6)
ALBUMIN/GLOB SERPL: 0.9 {RATIO} (ref 1.2–3.5)
ALP SERPL-CCNC: 93 U/L (ref 50–136)
ALT SERPL-CCNC: 18 U/L (ref 12–65)
ANION GAP SERPL CALC-SCNC: 5 MMOL/L (ref 7–16)
AST SERPL-CCNC: 15 U/L (ref 15–37)
ATRIAL RATE: 71 BPM
BILIRUB SERPL-MCNC: 0.4 MG/DL (ref 0.2–1.1)
BUN SERPL-MCNC: 13 MG/DL (ref 8–23)
CALCIUM SERPL-MCNC: 9.1 MG/DL (ref 8.3–10.4)
CALCULATED R AXIS, ECG10: -20 DEGREES
CALCULATED T AXIS, ECG11: 39 DEGREES
CHLORIDE SERPL-SCNC: 105 MMOL/L (ref 98–107)
CO2 SERPL-SCNC: 28 MMOL/L (ref 21–32)
CREAT SERPL-MCNC: 1.03 MG/DL (ref 0.6–1)
DIAGNOSIS, 93000: NORMAL
ERYTHROCYTE [DISTWIDTH] IN BLOOD BY AUTOMATED COUNT: 13.6 % (ref 11.9–14.6)
GLOBULIN SER CALC-MCNC: 3.8 G/DL (ref 2.3–3.5)
GLUCOSE SERPL-MCNC: 93 MG/DL (ref 65–100)
HCT VFR BLD AUTO: 35 % (ref 35.8–46.3)
HGB BLD-MCNC: 11.1 G/DL (ref 11.7–15.4)
INR PPP: 1.4
MAGNESIUM SERPL-MCNC: 2.4 MG/DL (ref 1.8–2.4)
MCH RBC QN AUTO: 28.2 PG (ref 26.1–32.9)
MCHC RBC AUTO-ENTMCNC: 31.7 G/DL (ref 31.4–35)
MCV RBC AUTO: 89.1 FL (ref 79.6–97.8)
NRBC # BLD: 0 K/UL (ref 0–0.2)
PLATELET # BLD AUTO: 254 K/UL (ref 150–450)
PMV BLD AUTO: 9.6 FL (ref 9.4–12.3)
POTASSIUM SERPL-SCNC: 3.8 MMOL/L (ref 3.5–5.1)
PROT SERPL-MCNC: 7.2 G/DL (ref 6.3–8.2)
PROTHROMBIN TIME: 17.4 SEC (ref 12.5–14.7)
Q-T INTERVAL, ECG07: 452 MS
QRS DURATION, ECG06: 110 MS
QTC CALCULATION (BEZET), ECG08: 477 MS
RBC # BLD AUTO: 3.93 M/UL (ref 4.05–5.2)
SODIUM SERPL-SCNC: 138 MMOL/L (ref 136–145)
VENTRICULAR RATE, ECG03: 67 BPM
WBC # BLD AUTO: 5.3 K/UL (ref 4.3–11.1)

## 2021-08-03 PROCEDURE — 74011000250 HC RX REV CODE- 250: Performed by: INTERNAL MEDICINE

## 2021-08-03 PROCEDURE — 99152 MOD SED SAME PHYS/QHP 5/>YRS: CPT

## 2021-08-03 PROCEDURE — 93312 ECHO TRANSESOPHAGEAL: CPT

## 2021-08-03 PROCEDURE — 99153 MOD SED SAME PHYS/QHP EA: CPT

## 2021-08-03 PROCEDURE — 85610 PROTHROMBIN TIME: CPT

## 2021-08-03 PROCEDURE — 80053 COMPREHEN METABOLIC PANEL: CPT

## 2021-08-03 PROCEDURE — 83735 ASSAY OF MAGNESIUM: CPT

## 2021-08-03 PROCEDURE — 85027 COMPLETE CBC AUTOMATED: CPT

## 2021-08-03 PROCEDURE — 93325 DOPPLER ECHO COLOR FLOW MAPG: CPT | Performed by: INTERNAL MEDICINE

## 2021-08-03 PROCEDURE — 99152 MOD SED SAME PHYS/QHP 5/>YRS: CPT | Performed by: INTERNAL MEDICINE

## 2021-08-03 PROCEDURE — 93320 DOPPLER ECHO COMPLETE: CPT | Performed by: INTERNAL MEDICINE

## 2021-08-03 PROCEDURE — 93312 ECHO TRANSESOPHAGEAL: CPT | Performed by: INTERNAL MEDICINE

## 2021-08-03 PROCEDURE — 74011250636 HC RX REV CODE- 250/636: Performed by: INTERNAL MEDICINE

## 2021-08-03 PROCEDURE — 93005 ELECTROCARDIOGRAM TRACING: CPT | Performed by: INTERNAL MEDICINE

## 2021-08-03 RX ORDER — SODIUM CHLORIDE 9 MG/ML
75 INJECTION, SOLUTION INTRAVENOUS CONTINUOUS
Status: DISCONTINUED | OUTPATIENT
Start: 2021-08-03 | End: 2021-08-03 | Stop reason: HOSPADM

## 2021-08-03 RX ORDER — LIDOCAINE HYDROCHLORIDE 20 MG/ML
15 SOLUTION OROPHARYNGEAL AS NEEDED
Status: DISCONTINUED | OUTPATIENT
Start: 2021-08-03 | End: 2021-08-03 | Stop reason: HOSPADM

## 2021-08-03 RX ORDER — FENTANYL CITRATE 50 UG/ML
25-200 INJECTION, SOLUTION INTRAMUSCULAR; INTRAVENOUS AS NEEDED
Status: DISCONTINUED | OUTPATIENT
Start: 2021-08-03 | End: 2021-08-03 | Stop reason: HOSPADM

## 2021-08-03 RX ORDER — MIDAZOLAM HYDROCHLORIDE 1 MG/ML
1-10 INJECTION, SOLUTION INTRAMUSCULAR; INTRAVENOUS AS NEEDED
Status: DISCONTINUED | OUTPATIENT
Start: 2021-08-03 | End: 2021-08-03 | Stop reason: HOSPADM

## 2021-08-03 RX ADMIN — LIDOCAINE HYDROCHLORIDE 15 ML: 20 SOLUTION ORAL; TOPICAL at 09:20

## 2021-08-03 RX ADMIN — MIDAZOLAM 1 MG: 1 INJECTION INTRAMUSCULAR; INTRAVENOUS at 09:33

## 2021-08-03 RX ADMIN — MIDAZOLAM 2 MG: 1 INJECTION INTRAMUSCULAR; INTRAVENOUS at 09:30

## 2021-08-03 NOTE — PROGRESS NOTES
Pt arrived, ambulated to room with no visible problems, planned PREETHI for Dr Gregor Reese. Consent signed, Procedure discussed with pt all questions answered voiced understanding. Medications and history discussed with pt.     Pt prepped per ordersThe patient has a fraility score of 3-MANAGING WELL, based on ability to complete ADLs without assistance

## 2021-08-03 NOTE — PROGRESS NOTES
Transesophageal (PREETHI) Echo Note  - Indication: pre Watchman, paroxysmal atrial fibrillation  - pt underwent successful PREETHI today in cath holding  - start 0920  - stop 0946  - sedation: 3 mg IV Midazolam given by Pricila Lo RN under my direct supervision. Direct monitoring of vital signs and respiratory status throughout the procedure. - An independent trained observer pushed medications at my direction. We monitored the patient's level of consciousness and vital signs/physiologic status throughout the procedure duration (see start and stop times above). - No complications, pt in stable condition  - PREETHI Brief Findings: Mildly reduced LVEF 40-45%, mild/mod MR, multiple RONALDO measurements made for Watchman device sizing.   - tolerated well  - no medication changes today   - OK for oral intake at 1145  - No driving or heavy machine operation today. Results discussed with patient and family at bedside.   - results will be made available to [facility] group for procedural planning.

## 2021-08-03 NOTE — PROGRESS NOTES
Patient received to [address] [place] [address] room # 16  Ambulatory from [facility]. Patient scheduled for PREETHI today with Dr Jerry Garrido. Procedure reviewed & questions answered, voiced good understanding consent obtained & placed on chart. All medications and medical history reviewed. Will prep patient per orders. Patient & family updated on plan of care. The patient has a fraility score of 3-MANAGING WELL, based on ability to ambulate independently.

## 2021-08-03 NOTE — DISCHARGE INSTRUCTIONS
AFTER YOU TRANSESOPHAGEAL ECHOCARDIOGRAM    Be sure someone else drives you home. You may feel drowsy for several hours. Do not eat or drink for at least two hours after your procedure. Your throat will be numb and there is a risk you might have difficulty swallowing for a while. Be careful when you do eat or drink for the first time especially with hot fluids since you could easily burn your throat. Call your doctor if:    · You are bleeding from your throat or mouth. · You have trouble breathing all of a sudden. · You have chest pain or any pain that spreads to your neck, jaw, or arms. · You have questions or concerns. · You have a fever greater than 101°F.    Doctor: [facility] at [phone]    Special Instructions:    No driving for 24 hours. Nothing to eat/drink until 11:20. Start with small sips of something cool. If tolerated you may progress to solid foods. If you start to cough/choke stop eating/drinking wait 30 minutes and then try again. Marci Vyas the [employer] will be in contact with you.

## 2021-08-03 NOTE — H&P
[address], [address] [place], [place] [address]ome [facility], [address] [place] [address]    Patient:  Kristen Wray  1950       SUBJECTIVE:  Kristen Wray is a  66 y.o. female seen for the following:     No chief complaint on file. CC: atrial fibrillation     HPI:   Kristen Wray is a very pleasant 65 y.o. female with a past medical and cardiac history significant for NICM and permanent AF and presents to discuss Watchman. She has had recurrent falls resulting in significant bruising. She has been on eliquis for AF. Has been seen in office with Dr Fabiano Padron (EP) and thought to be candidate for Watchman. She is here for preoperative PREETHI for Watchman consideration. NPO since midnight. Denies chest pain, difficulty swallowing, palpitations, dyspnea at this time. No other complaints. Past medical history, past surgical history, family history, social history, and medications were all reviewed with the patient today and updated as necessary. Outpatient Medications Marked as Taking for the 8/3/21 encounter [facility] Encounter) with [facility] CATH/CV FLOAT RN   Medication Sig Dispense Refill    losartan (COZAAR) 25 mg tablet TAKE ONE TABLET BY MOUTH ONCE DAILY. 90 Tablet 3    apixaban (ELIQUIS) 5 mg tablet Take 1 Tablet by mouth two (2) times a day. 180 Tablet 3    metoprolol succinate (TOPROL-XL) 25 mg XL tablet Take 1 Tablet by mouth daily. 1/2 qd 90 Tablet 3    furosemide (Lasix) 40 mg tablet Take 1 Tablet by mouth daily. 90 Tablet 3    potassium chloride SR (KLOR-CON 10) 10 mEq tablet Take 1 Tablet by mouth daily. 90 Tablet 3    spironolactone (ALDACTONE) 25 mg tablet Take 1 Tablet by mouth daily. 90 Tablet 3    nitroglycerin (NITROSTAT) 0.4 mg SL tablet 1 Tablet by SubLINGual route every five (5) minutes as needed for Chest Pain. 25 Tablet 11    polyethylene glycol (Miralax) 17 gram/dose powder Take 17 g by mouth daily.  multivitamin (ONE A DAY) tablet Take 1 Tab by mouth daily.       estradiol (ESTRACE) 2 mg tablet Take 1 mg by mouth every Monday, Wednesday, Friday. 1 tab every other day/ .5 tab every other day        fluticasone (FLONASE) 50 mcg/actuation nasal spray 2 Sprays by Both Nostrils route daily.  montelukast (SINGULAIR) 10 mg tablet Take 10 mg by mouth daily.  omeprazole (PRILOSEC) 20 mg capsule Take 20 mg by mouth daily. Patient Active Problem List    Diagnosis    Severe obesity ([address]    Snoring    Pulmonary HTN [facility]    NICM (nonischemic cardiomyopathy) (HCC)    Takotsubo cardiomyopathy    Paroxysmal atrial fibrillation (HCC)    Sick sinus syndrome ([facility]    Coronary artery disease involving native coronary artery of native heart without angina pectoris    Systolic CHF, chronic (HCC)    Shortness of breath    Chronic fatigue    Edema    Arthralgia of multiple joints    Herpes zoster    Malignant melanoma of skin of upper limb, including shoulder ([facility]    Esophageal reflux    Vitamin D deficiency    Hypercholesteremia    Hypertension       Family History   Problem Relation Age of Onset    Coronary Artery Disease Father     Heart Attack Father     Stroke Father     Sudden Death Father        Social History     Tobacco Use    Smoking status: Never Smoker    Smokeless tobacco: Never Used   Substance Use Topics    Alcohol use: No       Review of Systems   Constitutional: Negative. HENT: Negative. Able to swallow food without severe dysphagia. Cardiovascular: Positive for leg swelling (chronic). Negative for chest pain, irregular heartbeat and palpitations. Respiratory: Negative. Negative for shortness of breath. Hematologic/Lymphatic: Bruises/bleeds easily. Musculoskeletal: Positive for falls (reports history of falls. ). Gastrointestinal: Negative. Neurological: Negative. Psychiatric/Behavioral: Negative. Allergic/Immunologic: Negative. All other systems reviewed and are negative.       PHYSICAL EXAM:    Visit Vitals  /82 (BP 1 Location: Left upper arm, BP Patient Position: At rest)   Pulse 66   Resp 18   Ht 5' 3\" (1.6 m)   Wt 213 lb (96.6 kg)   SpO2 98%   BMI 37.73 kg/m²       Physical Exam  Vitals reviewed. Constitutional:       Appearance: She is well-developed. HENT:      Head: Normocephalic and atraumatic. Nose: Nose normal.   Eyes:      General: No scleral icterus. Right eye: No discharge. Left eye: No discharge. Conjunctiva/sclera: Conjunctivae normal.   Neck:      Vascular: No JVD. Trachea: No tracheal deviation. Cardiovascular:      Rate and Rhythm: Normal rate. Rhythm irregularly irregular. Pulses:           Radial pulses are 2+ on the right side and 2+ on the left side. Heart sounds: Normal heart sounds, S1 normal and S2 normal. No murmur heard. No gallop. Pulmonary:      Effort: Pulmonary effort is normal.      Breath sounds: Normal breath sounds. No wheezing or rales. Abdominal:      General: There is no distension. Palpations: Abdomen is soft. Musculoskeletal:         General: Normal range of motion. Cervical back: Normal range of motion. Right lower leg: Edema (mild) present. Left lower leg: Edema (mild) present. Skin:     General: Skin is warm and dry. Neurological:      Mental Status: She is alert and oriented to person, place, and time. Psychiatric:         Behavior: Behavior normal.         Thought Content:  Thought content normal.         Judgment: Judgment normal.         Recent Results (from the past 168 hour(s))   CBC W/O DIFF    Collection Time: 08/03/21  8:15 AM   Result Value Ref Range    WBC 5.3 4.3 - 11.1 K/uL    RBC 3.93 (L) 4.05 - 5.2 M/uL    HGB 11.1 (L) 11.7 - 15.4 g/dL    HCT 35.0 (L) 35.8 - 46.3 %    MCV 89.1 79.6 - 97.8 FL    MCH 28.2 26.1 - 32.9 PG    MCHC 31.7 31.4 - 35.0 g/dL    RDW 13.6 11.9 - 14.6 %    PLATELET 842 876 - 989 K/uL    MPV 9.6 9.4 - 12.3 FL    ABSOLUTE NRBC 0.00 0.0 - 0.2 K/uL METABOLIC PANEL, COMPREHENSIVE    Collection Time: 08/03/21  8:15 AM   Result Value Ref Range    Sodium 138 136 - 145 mmol/L    Potassium 3.8 3.5 - 5.1 mmol/L    Chloride 105 98 - 107 mmol/L    CO2 28 21 - 32 mmol/L    Anion gap 5 (L) 7 - 16 mmol/L    Glucose 93 65 - 100 mg/dL    BUN 13 8 - 23 MG/DL    Creatinine 1.03 (H) 0.6 - 1.0 MG/DL    GFR est AA >60 >60 ml/min/1.73m2    GFR est non-AA 56 (L) >60 ml/min/1.73m2    Calcium 9.1 8.3 - 10.4 MG/DL    Bilirubin, total PENDING MG/DL    ALT (SGPT) 18 12 - 65 U/L    AST (SGOT) 15 15 - 37 U/L    Alk. phosphatase PENDING U/L    Protein, total PENDING g/dL    Albumin 3.4 3.2 - 4.6 g/dL    Globulin PENDING g/dL    A-G Ratio PENDING     MAGNESIUM    Collection Time: 08/03/21  8:15 AM   Result Value Ref Range    Magnesium 2.4 1.8 - 2.4 mg/dL   EKG, 12 LEAD, INITIAL    Collection Time: 08/03/21  8:26 AM   Result Value Ref Range    Ventricular Rate 67 BPM    Atrial Rate 71 BPM    QRS Duration 110 ms    Q-T Interval 452 ms    QTC Calculation (Bezet) 477 ms    Calculated R Axis -20 degrees    Calculated T Axis 39 degrees    Diagnosis       Atrial fibrillation  Incomplete left bundle branch block  Minimal voltage criteria for LVH, may be normal variant  Abnormal ECG  When compared with ECG of 29-MAY-2017 04:40,  Nonspecific T wave abnormality, improved in Inferior leads  T wave inversion no longer evident in Anterolateral leads         ASSESSMENT/PLAN:    1.  Atrial fibrillation, unspecified type ([address]  - ECHO PREETHI W OR WO CONTRAST; Standing  - ECHO PREETHI W OR WO CONTRAST  - patient is pre-watchman (left atrial occluder device) implantation  - NPO  - consent on chart, risks benefits, alternatives have been discussed with the patient/patient's family   - questions answered  - appropriate to proceed to transesophageal echocardiogram    Jitendra Brennan DO  8/3/2021

## 2021-08-03 NOTE — PROGRESS NOTES
Discharge and follow up reviewed with pt and family at this time. Pt discharged to door via wheel chair.

## 2021-08-04 ENCOUNTER — TELEPHONE (OUTPATIENT)
Dept: CARDIAC CATH/INVASIVE PROCEDURES | Age: 71
End: 2021-08-04

## 2021-08-04 NOTE — TELEPHONE ENCOUNTER
Patient scheduled for watchman procedure on 9/7/21. She will be contacted the week prior to procedure with arrival times and other instructions.

## 2021-08-06 LAB
ECHO MV EROA PISA: 0.07 CM2
ECHO MV REGURGITANT RADIUS PISA: 0.41 CM
ECHO MV REGURGITANT VOLUME: 14.92 ML
ECHO MV REGURGITANT VTIA: 210.98 CM
ECHO TV REGURGITANT MAX VELOCITY: 396.51 CM/S
ECHO TV REGURGITANT MAX VELOCITY: 573.34 CM/S
ECHO TV REGURGITANT PEAK GRADIENT: 63.06 MMHG

## 2021-10-13 NOTE — PROGRESS NOTES
Mrs. Karlos Mims has been referred to the [address] [place] [address] for evaluation for Left Atrial Appendage Closure with the Watchman device for management of stroke risk resulting from non-valvular atrial fibrillation. Based on this patient's past history of recurrent falls resulting in significant bruising, it has been determined that she is a poor candidate for long-term oral anticoagulation, however may be tolerant of short term treatment needed for watchman implantation. Atrial Fibrillation CHADSVASC2 Score Stroke Risk:   66 y.o. 72 to 76  +1   female Female +1   CHF HX: Yes    +1   HTN HX: Yes    +1   Stroke/TIA/Thromboembolism No    +0   Vascular Disease HX: No    + 0   Diabetes Mellitus No    + 0   CHADSVASC 2 Score 4      Annual Stroke Risk 4.8%- moderate-high      HAS-BLED Score     HTN Hx [x] 1 Point   Renal Disease  [] 1 Point   Liver Disease [] [id number] Hx [] 1 Point   Prior Major Bleeding or Predisposition [] 1 Point   Labile INR [] 1 Point   Age > 65 Years Current: 66 y.o. [x] 1 Point   Rx Usage Predisposing to Bleeding [] 1 Point   Alcohol Use (> or = 8 Drinks/wk) [] 1 Point   Total: UCHASBLED: Score 2 Moderate Risk 4.1% Risk of major bleeding 1.88 Bleeds Per 100 pts years Recommendations Anticoagulation can be considered       Dr. Mari Ozuna and Dr. Willis Perkins have both had a face to face conversation with this patient explaining atrial fibrillation, stroke risk with atrial fibrillation, and the different treatment plans as related to their elevated stroke risk with atrial fibrillation. Based on stroke risk, as shown with [id number] score, and bleeding risk, as shown with HAS-BLED score, a shared decision has been made to pursue closure of the left atrial appendage as a safe and effective alternative to oral anticoagulation.

## 2021-10-18 ENCOUNTER — ANESTHESIA EVENT (OUTPATIENT)
Dept: SURGERY | Age: 71
DRG: 274 | End: 2021-10-18
Payer: MEDICARE

## 2021-10-18 ENCOUNTER — TELEPHONE (OUTPATIENT)
Dept: CARDIAC CATH/INVASIVE PROCEDURES | Age: 71
End: 2021-10-18

## 2021-10-18 ENCOUNTER — HOSPITAL ENCOUNTER (OUTPATIENT)
Dept: LAB | Age: 71
Discharge: HOME | DRG: 274 | End: 2021-10-18
Payer: MEDICARE

## 2021-10-18 DIAGNOSIS — I48.21 PERMANENT ATRIAL FIBRILLATION (HCC): ICD-10-CM

## 2021-10-18 DIAGNOSIS — I48.21 PERMANENT ATRIAL FIBRILLATION (HCC): Primary | ICD-10-CM

## 2021-10-18 LAB
ALBUMIN SERPL-MCNC: 3.5 G/DL (ref 3.2–4.6)
ANION GAP SERPL CALC-SCNC: 5 MMOL/L (ref 7–16)
BUN SERPL-MCNC: 14 MG/DL (ref 8–23)
CALCIUM SERPL-MCNC: 9.3 MG/DL (ref 8.3–10.4)
CHLORIDE SERPL-SCNC: 104 MMOL/L (ref 98–107)
CO2 SERPL-SCNC: 29 MMOL/L (ref 21–32)
CREAT SERPL-MCNC: 1.09 MG/DL (ref 0.6–1)
ERYTHROCYTE [DISTWIDTH] IN BLOOD BY AUTOMATED COUNT: 13.3 % (ref 11.9–14.6)
GLUCOSE SERPL-MCNC: 104 MG/DL (ref 65–100)
HCT VFR BLD AUTO: 39.6 % (ref 35.8–46.3)
HGB BLD-MCNC: 12.2 G/DL (ref 11.7–15.4)
HISTORY CHECKED?,CKHIST: NORMAL
INR PPP: 1.2
MCH RBC QN AUTO: 27.9 PG (ref 26.1–32.9)
MCHC RBC AUTO-ENTMCNC: 30.8 G/DL (ref 31.4–35)
MCV RBC AUTO: 90.6 FL (ref 79.6–97.8)
NRBC # BLD: 0 K/UL (ref 0–0.2)
PLATELET # BLD AUTO: 278 K/UL (ref 150–450)
PMV BLD AUTO: 10.4 FL (ref 9.4–12.3)
POTASSIUM SERPL-SCNC: 3.9 MMOL/L (ref 3.5–5.1)
PROTHROMBIN TIME: 15.2 SEC (ref 12.6–14.5)
RBC # BLD AUTO: 4.37 M/UL (ref 4.05–5.2)
SODIUM SERPL-SCNC: 138 MMOL/L (ref 136–145)
WBC # BLD AUTO: 6.3 K/UL (ref 4.3–11.1)

## 2021-10-18 PROCEDURE — 86901 BLOOD TYPING SEROLOGIC RH(D): CPT

## 2021-10-18 PROCEDURE — 85027 COMPLETE CBC AUTOMATED: CPT

## 2021-10-18 PROCEDURE — 82040 ASSAY OF SERUM ALBUMIN: CPT

## 2021-10-18 PROCEDURE — 86920 COMPATIBILITY TEST SPIN: CPT

## 2021-10-18 PROCEDURE — 80048 BASIC METABOLIC PNL TOTAL CA: CPT

## 2021-10-18 PROCEDURE — 36415 COLL VENOUS BLD VENIPUNCTURE: CPT

## 2021-10-18 PROCEDURE — 85610 PROTHROMBIN TIME: CPT

## 2021-10-18 RX ORDER — SODIUM CHLORIDE, SODIUM LACTATE, POTASSIUM CHLORIDE, CALCIUM CHLORIDE 600; 310; 30; 20 MG/100ML; MG/100ML; MG/100ML; MG/100ML
1000 INJECTION, SOLUTION INTRAVENOUS CONTINUOUS
Status: CANCELLED | OUTPATIENT
Start: 2021-10-18 | End: 2021-10-19

## 2021-10-18 RX ORDER — FENTANYL CITRATE 50 UG/ML
100 INJECTION, SOLUTION INTRAMUSCULAR; INTRAVENOUS ONCE
Status: CANCELLED | OUTPATIENT
Start: 2021-10-18 | End: 2021-10-18

## 2021-10-18 RX ORDER — MIDAZOLAM HYDROCHLORIDE 1 MG/ML
2 INJECTION, SOLUTION INTRAMUSCULAR; INTRAVENOUS ONCE
Status: CANCELLED | OUTPATIENT
Start: 2021-10-18 | End: 2021-10-18

## 2021-10-18 RX ORDER — LIDOCAINE HYDROCHLORIDE 10 MG/ML
0.1 INJECTION INFILTRATION; PERINEURAL AS NEEDED
Status: CANCELLED | OUTPATIENT
Start: 2021-10-18

## 2021-10-18 RX ORDER — SODIUM CHLORIDE, SODIUM LACTATE, POTASSIUM CHLORIDE, CALCIUM CHLORIDE 600; 310; 30; 20 MG/100ML; MG/100ML; MG/100ML; MG/100ML
100 INJECTION, SOLUTION INTRAVENOUS CONTINUOUS
Status: CANCELLED | OUTPATIENT
Start: 2021-10-18 | End: 2021-10-19

## 2021-10-18 RX ORDER — MIDAZOLAM HYDROCHLORIDE 1 MG/ML
2 INJECTION, SOLUTION INTRAMUSCULAR; INTRAVENOUS
Status: CANCELLED | OUTPATIENT
Start: 2021-10-18 | End: 2021-10-19

## 2021-10-18 RX ORDER — ACETAMINOPHEN 500 MG
1000 TABLET ORAL ONCE
Status: CANCELLED | OUTPATIENT
Start: 2021-10-18 | End: 2021-10-18

## 2021-10-18 NOTE — ANESTHESIA PREPROCEDURE EVALUATION
Relevant Problems   RESPIRATORY SYSTEM   (+) Shortness of breath      CARDIOVASCULAR   (+) Coronary artery disease involving native coronary artery of native heart without angina pectoris   (+) Hypertension   (+) Paroxysmal atrial fibrillation (HCC)   (+) Sick sinus syndrome (HCC)      GASTROINTESTINAL   (+) Esophageal reflux      ENDOCRINE   (+) Severe obesity (HCC)      PERSONAL HX & FAMILY HX OF CANCER   (+) Malignant melanoma of skin of upper limb, including shoulder (HCC)       Anesthetic History     PONV          Review of Systems / Medical History  Patient summary reviewed and pertinent labs reviewed    Pulmonary        Sleep apnea  Shortness of breath      Comments: pHTN   Neuro/Psych              Cardiovascular    Hypertension      CHF (previously Takotsubo's)  Dysrhythmias (SSS) : atrial fibrillation      Exercise tolerance: <4 METS  Comments: Severe PHTN  RVSP ~5/2 systolic.    GI/Hepatic/Renal     GERD           Endo/Other        Morbid obesity and arthritis     Other Findings            Physical Exam    Airway  Mallampati: II  TM Distance: 4 - 6 cm         Cardiovascular    Rhythm: regular  Rate: normal      Pertinent negatives: No murmur   Dental  No notable dental hx       Pulmonary  Breath sounds clear to auscultation               Abdominal         Other Findings            Anesthetic Plan    ASA: 4  Anesthesia type: general    Monitoring Plan: Arterial line      Induction: Intravenous  Anesthetic plan and risks discussed with: Patient

## 2021-10-19 ENCOUNTER — HOSPITAL ENCOUNTER (INPATIENT)
Age: 71
LOS: 1 days | Discharge: HOME | DRG: 274 | End: 2021-10-19
Attending: INTERNAL MEDICINE | Admitting: INTERNAL MEDICINE
Payer: MEDICARE

## 2021-10-19 ENCOUNTER — HOSPITAL ENCOUNTER (INPATIENT)
Dept: CARDIAC CATH/INVASIVE PROCEDURES | Age: 71
Discharge: HOME | DRG: 274 | End: 2021-10-19
Attending: INTERNAL MEDICINE | Admitting: INTERNAL MEDICINE
Payer: MEDICARE

## 2021-10-19 ENCOUNTER — ANESTHESIA (OUTPATIENT)
Dept: SURGERY | Age: 71
DRG: 274 | End: 2021-10-19
Payer: MEDICARE

## 2021-10-19 VITALS
SYSTOLIC BLOOD PRESSURE: 119 MMHG | WEIGHT: 218 LBS | BODY MASS INDEX: 38.62 KG/M2 | DIASTOLIC BLOOD PRESSURE: 68 MMHG | HEIGHT: 63 IN | TEMPERATURE: 97.3 F | HEART RATE: 67 BPM | RESPIRATION RATE: 16 BRPM | OXYGEN SATURATION: 98 %

## 2021-10-19 VITALS
HEIGHT: 63 IN | BODY MASS INDEX: 38.62 KG/M2 | SYSTOLIC BLOOD PRESSURE: 120 MMHG | WEIGHT: 218 LBS | DIASTOLIC BLOOD PRESSURE: 70 MMHG

## 2021-10-19 DIAGNOSIS — I48.21 PERMANENT ATRIAL FIBRILLATION (HCC): ICD-10-CM

## 2021-10-19 LAB
ACT BLD: 246 SECS (ref 70–128)
ATRIAL RATE: 300 BPM
CALCULATED R AXIS, ECG10: -29 DEGREES
CALCULATED T AXIS, ECG11: -7 DEGREES
DIAGNOSIS, 93000: NORMAL
Q-T INTERVAL, ECG07: 454 MS
QRS DURATION, ECG06: 106 MS
QTC CALCULATION (BEZET), ECG08: 464 MS
VENTRICULAR RATE, ECG03: 63 BPM

## 2021-10-19 PROCEDURE — C1759 CATH, INTRA ECHOCARDIOGRAPHY: HCPCS | Performed by: INTERNAL MEDICINE

## 2021-10-19 PROCEDURE — 77030019908 HC STETH ESOPH SIMS -A: Performed by: ANESTHESIOLOGY

## 2021-10-19 PROCEDURE — C1769 GUIDE WIRE: HCPCS | Performed by: INTERNAL MEDICINE

## 2021-10-19 PROCEDURE — 76210000027 HC REC RM PH II 3.5 TO 4 HR: Performed by: INTERNAL MEDICINE

## 2021-10-19 PROCEDURE — B245ZZ4 ULTRASONOGRAPHY OF LEFT HEART, TRANSESOPHAGEAL: ICD-10-PCS | Performed by: INTERNAL MEDICINE

## 2021-10-19 PROCEDURE — 74011250636 HC RX REV CODE- 250/636: Performed by: NURSE ANESTHETIST, CERTIFIED REGISTERED

## 2021-10-19 PROCEDURE — C1894 INTRO/SHEATH, NON-LASER: HCPCS | Performed by: INTERNAL MEDICINE

## 2021-10-19 PROCEDURE — 77030037088 HC TUBE ENDOTRACH ORAL NSL COVD-A: Performed by: ANESTHESIOLOGY

## 2021-10-19 PROCEDURE — 77030020506 HC NDL TRNSPTL NRG BAYL -F: Performed by: INTERNAL MEDICINE

## 2021-10-19 PROCEDURE — 77030040922 HC BLNKT HYPOTHRM STRY -A: Performed by: ANESTHESIOLOGY

## 2021-10-19 PROCEDURE — C1760 CLOSURE DEV, VASC: HCPCS | Performed by: INTERNAL MEDICINE

## 2021-10-19 PROCEDURE — 77030013794 HC KT TRNSDUC BLD EDWD -B: Performed by: ANESTHESIOLOGY

## 2021-10-19 PROCEDURE — 02L73DK OCCLUSION OF LEFT ATRIAL APPENDAGE WITH INTRALUMINAL DEVICE, PERCUTANEOUS APPROACH: ICD-10-PCS | Performed by: INTERNAL MEDICINE

## 2021-10-19 PROCEDURE — 93325 DOPPLER ECHO COLOR FLOW MAPG: CPT

## 2021-10-19 PROCEDURE — 77030004558 HC CATH ANGI DX SUPR TORQ CARD -A: Performed by: INTERNAL MEDICINE

## 2021-10-19 PROCEDURE — 76210000016 HC OR PH I REC 1 TO 1.5 HR: Performed by: INTERNAL MEDICINE

## 2021-10-19 PROCEDURE — 77030013292 HC BOWL MX PRSM J&J -A: Performed by: ANESTHESIOLOGY

## 2021-10-19 PROCEDURE — 74011250636 HC RX REV CODE- 250/636: Performed by: INTERNAL MEDICINE

## 2021-10-19 PROCEDURE — 76060000034 HC ANESTHESIA 1.5 TO 2 HR: Performed by: INTERNAL MEDICINE

## 2021-10-19 PROCEDURE — 33340 PERQ CLSR TCAT L ATR APNDGE: CPT | Performed by: INTERNAL MEDICINE

## 2021-10-19 PROCEDURE — 77030005401 HC CATH RAD ARRO -A: Performed by: ANESTHESIOLOGY

## 2021-10-19 PROCEDURE — 65270000029 HC RM PRIVATE

## 2021-10-19 PROCEDURE — C1893 INTRO/SHEATH, FIXED,NON-PEEL: HCPCS | Performed by: INTERNAL MEDICINE

## 2021-10-19 PROCEDURE — 93005 ELECTROCARDIOGRAM TRACING: CPT | Performed by: INTERNAL MEDICINE

## 2021-10-19 PROCEDURE — 77030039425 HC BLD LARYNG TRULITE DISP TELE -A: Performed by: ANESTHESIOLOGY

## 2021-10-19 PROCEDURE — 74011000636 HC RX REV CODE- 636: Performed by: INTERNAL MEDICINE

## 2021-10-19 PROCEDURE — 74011000250 HC RX REV CODE- 250: Performed by: NURSE ANESTHETIST, CERTIFIED REGISTERED

## 2021-10-19 PROCEDURE — 77030013687 HC GD NDL BARD -B: Performed by: INTERNAL MEDICINE

## 2021-10-19 PROCEDURE — 85347 COAGULATION TIME ACTIVATED: CPT

## 2021-10-19 PROCEDURE — 74011250636 HC RX REV CODE- 250/636: Performed by: ANESTHESIOLOGY

## 2021-10-19 PROCEDURE — C1889 IMPLANT/INSERT DEVICE, NOC: HCPCS | Performed by: INTERNAL MEDICINE

## 2021-10-19 PROCEDURE — 93355 ECHO TRANSESOPHAGEAL (TEE): CPT | Performed by: INTERNAL MEDICINE

## 2021-10-19 PROCEDURE — 93662 INTRACARDIAC ECG (ICE): CPT | Performed by: INTERNAL MEDICINE

## 2021-10-19 DEVICE — LEFT ATRIAL APPENDAGE CLOSURE DEVICE WITH DELIVERY SYSTEM
Type: IMPLANTABLE DEVICE | Status: FUNCTIONAL
Brand: WATCHMAN FLX™

## 2021-10-19 RX ORDER — SODIUM CHLORIDE 0.9 % (FLUSH) 0.9 %
5-40 SYRINGE (ML) INJECTION AS NEEDED
Status: CANCELLED | OUTPATIENT
Start: 2021-10-19

## 2021-10-19 RX ORDER — ROCURONIUM BROMIDE 10 MG/ML
INJECTION, SOLUTION INTRAVENOUS AS NEEDED
Status: DISCONTINUED | OUTPATIENT
Start: 2021-10-19 | End: 2021-10-19 | Stop reason: HOSPADM

## 2021-10-19 RX ORDER — HYDROMORPHONE HYDROCHLORIDE 2 MG/ML
0.5 INJECTION, SOLUTION INTRAMUSCULAR; INTRAVENOUS; SUBCUTANEOUS
Status: DISCONTINUED | OUTPATIENT
Start: 2021-10-19 | End: 2021-10-19 | Stop reason: HOSPADM

## 2021-10-19 RX ORDER — ONDANSETRON 2 MG/ML
4 INJECTION INTRAMUSCULAR; INTRAVENOUS
Status: DISCONTINUED | OUTPATIENT
Start: 2021-10-19 | End: 2021-10-19 | Stop reason: HOSPADM

## 2021-10-19 RX ORDER — CEFAZOLIN SODIUM/WATER 2 G/20 ML
2 SYRINGE (ML) INTRAVENOUS ONCE
Status: COMPLETED | OUTPATIENT
Start: 2021-10-19 | End: 2021-10-19

## 2021-10-19 RX ORDER — NALOXONE HYDROCHLORIDE 0.4 MG/ML
0.1 INJECTION, SOLUTION INTRAMUSCULAR; INTRAVENOUS; SUBCUTANEOUS AS NEEDED
Status: DISCONTINUED | OUTPATIENT
Start: 2021-10-19 | End: 2021-10-19 | Stop reason: HOSPADM

## 2021-10-19 RX ORDER — NICARDIPINE HYDROCHLORIDE 0.1 MG/ML
INJECTION INTRAVENOUS AS NEEDED
Status: DISCONTINUED | OUTPATIENT
Start: 2021-10-19 | End: 2021-10-19 | Stop reason: HOSPADM

## 2021-10-19 RX ORDER — PROTAMINE SULFATE 10 MG/ML
INJECTION, SOLUTION INTRAVENOUS AS NEEDED
Status: DISCONTINUED | OUTPATIENT
Start: 2021-10-19 | End: 2021-10-19 | Stop reason: HOSPADM

## 2021-10-19 RX ORDER — FUROSEMIDE 40 MG/1
40 TABLET ORAL DAILY
Status: CANCELLED | OUTPATIENT
Start: 2021-10-20

## 2021-10-19 RX ORDER — ESTRADIOL 1 MG/1
1 TABLET ORAL DAILY
Status: CANCELLED | OUTPATIENT
Start: 2021-10-19

## 2021-10-19 RX ORDER — SODIUM CHLORIDE 0.9 % (FLUSH) 0.9 %
5-40 SYRINGE (ML) INJECTION EVERY 8 HOURS
Status: CANCELLED | OUTPATIENT
Start: 2021-10-19

## 2021-10-19 RX ORDER — FENTANYL CITRATE 50 UG/ML
INJECTION, SOLUTION INTRAMUSCULAR; INTRAVENOUS AS NEEDED
Status: DISCONTINUED | OUTPATIENT
Start: 2021-10-19 | End: 2021-10-19 | Stop reason: HOSPADM

## 2021-10-19 RX ORDER — DIPHENHYDRAMINE HYDROCHLORIDE 50 MG/ML
12.5 INJECTION, SOLUTION INTRAMUSCULAR; INTRAVENOUS
Status: DISCONTINUED | OUTPATIENT
Start: 2021-10-19 | End: 2021-10-19 | Stop reason: HOSPADM

## 2021-10-19 RX ORDER — MONTELUKAST SODIUM 10 MG/1
10 TABLET ORAL DAILY
Status: CANCELLED | OUTPATIENT
Start: 2021-10-19

## 2021-10-19 RX ORDER — ALBUTEROL SULFATE 0.83 MG/ML
2.5 SOLUTION RESPIRATORY (INHALATION) AS NEEDED
Status: DISCONTINUED | OUTPATIENT
Start: 2021-10-19 | End: 2021-10-19 | Stop reason: HOSPADM

## 2021-10-19 RX ORDER — ASPIRIN 81 MG/1
81 TABLET ORAL DAILY
Qty: 30 TABLET | Refills: 0 | Status: SHIPPED | COMMUNITY
Start: 2021-10-19

## 2021-10-19 RX ORDER — SODIUM CHLORIDE, SODIUM LACTATE, POTASSIUM CHLORIDE, CALCIUM CHLORIDE 600; 310; 30; 20 MG/100ML; MG/100ML; MG/100ML; MG/100ML
INJECTION, SOLUTION INTRAVENOUS
Status: DISCONTINUED | OUTPATIENT
Start: 2021-10-19 | End: 2021-10-19 | Stop reason: HOSPADM

## 2021-10-19 RX ORDER — LIDOCAINE HYDROCHLORIDE 20 MG/ML
INJECTION, SOLUTION EPIDURAL; INFILTRATION; INTRACAUDAL; PERINEURAL AS NEEDED
Status: DISCONTINUED | OUTPATIENT
Start: 2021-10-19 | End: 2021-10-19 | Stop reason: HOSPADM

## 2021-10-19 RX ORDER — LOSARTAN POTASSIUM 25 MG/1
25 TABLET ORAL DAILY
Status: CANCELLED | OUTPATIENT
Start: 2021-10-20

## 2021-10-19 RX ORDER — HEPARIN SODIUM 200 [USP'U]/100ML
INJECTION, SOLUTION INTRAVENOUS
Status: DISCONTINUED | OUTPATIENT
Start: 2021-10-19 | End: 2021-10-19 | Stop reason: HOSPADM

## 2021-10-19 RX ORDER — EPHEDRINE SULFATE/0.9% NACL/PF 50 MG/5 ML
SYRINGE (ML) INTRAVENOUS AS NEEDED
Status: DISCONTINUED | OUTPATIENT
Start: 2021-10-19 | End: 2021-10-19 | Stop reason: HOSPADM

## 2021-10-19 RX ORDER — PROPOFOL 10 MG/ML
INJECTION, EMULSION INTRAVENOUS AS NEEDED
Status: DISCONTINUED | OUTPATIENT
Start: 2021-10-19 | End: 2021-10-19 | Stop reason: HOSPADM

## 2021-10-19 RX ORDER — HEPARIN SODIUM 1000 [USP'U]/ML
INJECTION, SOLUTION INTRAVENOUS; SUBCUTANEOUS AS NEEDED
Status: DISCONTINUED | OUTPATIENT
Start: 2021-10-19 | End: 2021-10-19 | Stop reason: HOSPADM

## 2021-10-19 RX ORDER — METOPROLOL SUCCINATE 25 MG/1
25 TABLET, EXTENDED RELEASE ORAL DAILY
Status: CANCELLED | OUTPATIENT
Start: 2021-10-19

## 2021-10-19 RX ORDER — ONDANSETRON 2 MG/ML
INJECTION INTRAMUSCULAR; INTRAVENOUS AS NEEDED
Status: DISCONTINUED | OUTPATIENT
Start: 2021-10-19 | End: 2021-10-19 | Stop reason: HOSPADM

## 2021-10-19 RX ORDER — DEXAMETHASONE SODIUM PHOSPHATE 100 MG/10ML
INJECTION INTRAMUSCULAR; INTRAVENOUS AS NEEDED
Status: DISCONTINUED | OUTPATIENT
Start: 2021-10-19 | End: 2021-10-19 | Stop reason: HOSPADM

## 2021-10-19 RX ORDER — SODIUM CHLORIDE, SODIUM LACTATE, POTASSIUM CHLORIDE, CALCIUM CHLORIDE 600; 310; 30; 20 MG/100ML; MG/100ML; MG/100ML; MG/100ML
100 INJECTION, SOLUTION INTRAVENOUS CONTINUOUS
Status: DISCONTINUED | OUTPATIENT
Start: 2021-10-19 | End: 2021-10-19 | Stop reason: HOSPADM

## 2021-10-19 RX ORDER — FLUTICASONE PROPIONATE 50 MCG
2 SPRAY, SUSPENSION (ML) NASAL DAILY
Status: CANCELLED | OUTPATIENT
Start: 2021-10-19

## 2021-10-19 RX ORDER — SPIRONOLACTONE 25 MG/1
25 TABLET ORAL DAILY
Status: CANCELLED | OUTPATIENT
Start: 2021-10-19

## 2021-10-19 RX ORDER — ONDANSETRON 2 MG/ML
4 INJECTION INTRAMUSCULAR; INTRAVENOUS ONCE
Status: COMPLETED | OUTPATIENT
Start: 2021-10-19 | End: 2021-10-19

## 2021-10-19 RX ADMIN — HYDROMORPHONE HYDROCHLORIDE 0.5 MG: 2 INJECTION, SOLUTION INTRAMUSCULAR; INTRAVENOUS; SUBCUTANEOUS at 11:06

## 2021-10-19 RX ADMIN — HEPARIN SODIUM 6000 UNITS: 1000 INJECTION, SOLUTION INTRAVENOUS; SUBCUTANEOUS at 09:48

## 2021-10-19 RX ADMIN — SODIUM CHLORIDE 120 MCG: 900 INJECTION, SOLUTION INTRAVENOUS at 09:16

## 2021-10-19 RX ADMIN — NICARDIPINE HYDROCHLORIDE 200 MCG: 0.1 INJECTION, SOLUTION INTRAVENOUS at 10:26

## 2021-10-19 RX ADMIN — CEFAZOLIN 2 G: 1 INJECTION, POWDER, FOR SOLUTION INTRAVENOUS at 09:34

## 2021-10-19 RX ADMIN — NICARDIPINE HYDROCHLORIDE 150 MCG: 0.1 INJECTION, SOLUTION INTRAVENOUS at 10:19

## 2021-10-19 RX ADMIN — FENTANYL CITRATE 25 MCG: 50 INJECTION INTRAMUSCULAR; INTRAVENOUS at 10:33

## 2021-10-19 RX ADMIN — ONDANSETRON 4 MG: 2 INJECTION INTRAMUSCULAR; INTRAVENOUS at 09:21

## 2021-10-19 RX ADMIN — PROPOFOL 150 MG: 10 INJECTION, EMULSION INTRAVENOUS at 09:15

## 2021-10-19 RX ADMIN — NICARDIPINE HYDROCHLORIDE 100 MCG: 0.1 INJECTION, SOLUTION INTRAVENOUS at 10:17

## 2021-10-19 RX ADMIN — FENTANYL CITRATE 25 MCG: 50 INJECTION INTRAMUSCULAR; INTRAVENOUS at 10:30

## 2021-10-19 RX ADMIN — SUGAMMADEX 200 MG: 100 INJECTION, SOLUTION INTRAVENOUS at 10:16

## 2021-10-19 RX ADMIN — SODIUM CHLORIDE, SODIUM LACTATE, POTASSIUM CHLORIDE, AND CALCIUM CHLORIDE: 600; 310; 30; 20 INJECTION, SOLUTION INTRAVENOUS at 09:09

## 2021-10-19 RX ADMIN — FENTANYL CITRATE 50 MCG: 50 INJECTION INTRAMUSCULAR; INTRAVENOUS at 09:15

## 2021-10-19 RX ADMIN — HYDROMORPHONE HYDROCHLORIDE 0.5 MG: 2 INJECTION, SOLUTION INTRAMUSCULAR; INTRAVENOUS; SUBCUTANEOUS at 11:20

## 2021-10-19 RX ADMIN — SODIUM CHLORIDE 60 MCG: 900 INJECTION, SOLUTION INTRAVENOUS at 10:00

## 2021-10-19 RX ADMIN — HEPARIN SODIUM 1000 UNITS: 1000 INJECTION, SOLUTION INTRAVENOUS; SUBCUTANEOUS at 10:02

## 2021-10-19 RX ADMIN — SODIUM CHLORIDE 120 MCG: 900 INJECTION, SOLUTION INTRAVENOUS at 09:49

## 2021-10-19 RX ADMIN — NICARDIPINE HYDROCHLORIDE 100 MCG: 0.1 INJECTION, SOLUTION INTRAVENOUS at 10:18

## 2021-10-19 RX ADMIN — HYDROMORPHONE HYDROCHLORIDE 0.5 MG: 2 INJECTION, SOLUTION INTRAMUSCULAR; INTRAVENOUS; SUBCUTANEOUS at 10:59

## 2021-10-19 RX ADMIN — ONDANSETRON 4 MG: 2 INJECTION INTRAMUSCULAR; INTRAVENOUS at 11:10

## 2021-10-19 RX ADMIN — ROCURONIUM BROMIDE 10 MG: 10 INJECTION, SOLUTION INTRAVENOUS at 10:09

## 2021-10-19 RX ADMIN — PROTAMINE SULFATE 30 MG: 10 INJECTION, SOLUTION INTRAVENOUS at 10:36

## 2021-10-19 RX ADMIN — DEXAMETHASONE SODIUM PHOSPHATE 4 MG: 10 INJECTION INTRAMUSCULAR; INTRAVENOUS at 09:21

## 2021-10-19 RX ADMIN — NICARDIPINE HYDROCHLORIDE 150 MCG: 0.1 INJECTION, SOLUTION INTRAVENOUS at 10:20

## 2021-10-19 RX ADMIN — ROCURONIUM BROMIDE 30 MG: 10 INJECTION, SOLUTION INTRAVENOUS at 09:16

## 2021-10-19 RX ADMIN — HEPARIN SODIUM 6000 UNITS: 1000 INJECTION, SOLUTION INTRAVENOUS; SUBCUTANEOUS at 09:50

## 2021-10-19 RX ADMIN — LIDOCAINE HYDROCHLORIDE 100 MG: 20 INJECTION, SOLUTION EPIDURAL; INFILTRATION; INTRACAUDAL; PERINEURAL at 09:15

## 2021-10-19 NOTE — Clinical Note
Device catheter removed. Delivery catheter removed, LAAO device deployed. Placement of LAAO device was verified. Pigtail catheter removed. prior to insertion of device. Shanda Smith

## 2021-10-19 NOTE — H&P
[facility] Cardiology/Electrophyiology Consult                Date of  Admission: 10/19/2021  6:51 AM     CC/Reason for admission: Marshal Bosworth is a 66 y.o. female admitted for Permanent atrial fibrillation ([facility] [I48.21]. 65 y.o. female with a past medical and cardiac history significant for NICM and permanent AF and presents for scheduled Watchman. She has had recurrent falls resulting in significant bruising. She has been on eliquis for AF. Cardiac PMH: (Old records have been reviewed and summarized below)  Echo 9/16 showing EF 45-50%, mild to mod MRAnahi GUNDERSON on 10/3/16:  mild CAD. Echo 5/2017: EF 25%, RVSP 60.   No CPAP needed, passed sleep study  Echo 7/2019: normal EF, severe TR  NST 3/2020: low risk    Patient Active Problem List   Diagnosis Code    Edema R60.9    Arthralgia of multiple joints M25.50    Herpes zoster B02.9    Malignant melanoma of skin of upper limb, including shoulder ([facility]) C43.60    Esophageal reflux K21.9    Vitamin D deficiency E55.9    Hypercholesteremia E78.00    Hypertension Z45    Systolic CHF, chronic (HCC) I50.22    Shortness of breath R06.02    Chronic fatigue R53.82    Coronary artery disease involving native coronary artery of native heart without angina pectoris I25.10    Paroxysmal atrial fibrillation (HCC) I48.0    Sick sinus syndrome (HCC) I49.5    Takotsubo cardiomyopathy I51.81    NICM (nonischemic cardiomyopathy) ([facility]) I42.8    Pulmonary HTN ([facility]) I27.20    Severe obesity ([facility]) E66.01    Snoring R06.83    Permanent atrial fibrillation (HCC) I48.21       Past Medical History:   Diagnosis Date    Arrhythmia     Arthritis     CAD (coronary artery disease)     Cancer ([facility]     Congestive heart failure (HCC)     Essential hypertension     GERD (gastroesophageal reflux disease)     Hyperlipidemia     Morbid obesity (HCC)     Nausea & vomiting     Sleep apnea       Past Surgical History:   Procedure Laterality Date    HX CHOLECYSTECTOMY      HX GASTRIC BYPASS      HX HYSTERECTOMY      HX ORTHOPAEDIC      right rotator cuff    HX ORTHOPAEDIC      bilateral hand surgeries    HX TONSILLECTOMY       Allergies   Allergen Reactions    Latex Rash    Other Food Unknown (comments)     Nuts brazil    Atorvastatin Unknown (comments)    Azo Pms [Black Cohosh-Chaste-Am. Ginseng] Itching    Codeine Itching    Hydrocodone Unknown (comments)    Nickel Rash    Oxycodone Unknown (comments)    Phenazopyridine Unknown (comments)     NOT ALLERGIC    Statins-Hmg-Coa Reductase Inhibitors Myalgia      Family History   Problem Relation Age of Onset    Coronary Artery Disease Father     Heart Attack Father     Stroke Father     Sudden Death Father         Current Facility-Administered Medications   Medication Dose Route Frequency    ceFAZolin (ANCEF) 2 g/20 mL in sterile water IV syringe  2 g IntraVENous ONCE       Review of Symptoms:  A comprehensive ROS was performed with the pertinent positives and negatives mentioned in the HPI, all other systems reviewed and are negative       Physical Exam  Vitals:    10/19/21 0738 10/19/21 0740   BP: 120/70    Pulse: 66    SpO2: 97%    Weight:  218 lb (98.9 kg)   Height: 5' 3\" (1.6 m) 5' 3\" (1.6 m)       Physical Exam:  Gen: well appearing, well developed, NAD  Eyes: Pupils equal, EOMI  ENT: oropharynx clear, no oral lesions, normal dentition  CV: RRR, no M/R/G, PMI not palpable, normal JVD, no carotid bruits, normal distal pulses, no JANY    Cardiographics    Telemetry:   ECG (Indpendently visualized and interpreted):  Echocardiogram:     Labs:   Recent Labs     10/18/21  1510      K 3.9   BUN 14   CREA 1.09*   *   WBC 6.3   HGB 12.2   HCT 39.6      INR 1.2        Assessment:      Active Problems:    Permanent atrial fibrillation (HCC) (10/19/2021)           Plan:   1. CVA protection: Patient is an appropriate candidate for consideration of left atrial appendage occlusion.   The patient's [id number] score is  4 which indicates a 4.0% annual risk of stroke. (age, F, HTN, NICM with NYHA class I). Has-BLED score is 2 which indicates a 1.88% annual risk of a major bleeding event. (age, HTN). I have discussed with the rationale for  left atrial appendage occlusion. We discussed the available data from randomized trials which demonstate left atrial appendage occlusion is as effective as long term anticoagulation at prevention of CVA or systemic embolism. We also discussed that left atrial appendage closure reduces risk of CVA or sytemic embolization by 67-83% compared to no anticoagulation. The risk of left atrial appendage were also discussed. Risk of major complication is approximately 1.5% based on available data. Risk include but not limited:              - Pericardial tamponade (1.28%) which can be treated percutaneously in 63% of cases but can require sugical therapy in  31% of cases (3 out of 1000 patients). - Procedural related CVA in 2 out of 1000 patients. - Device embolization in less than 3 out of 1000 patients              - Death related to procedure in approximately 6 out of 10,000 patients.       Based on our discussion, it is felt benefits of left atrial appendage significantly outweigh risk. All questions answered to the best of my ability. Patient would like to proceed with Watchman. Steve Jane MD, MS  Cardiology/Electrophysiology

## 2021-10-19 NOTE — PROGRESS NOTES
Patient received to [address] [place] [address] room # 12  Ambulatory from [facility]. Patient scheduled for LAAO today with Dr Elizabeth Prakash. Procedure reviewed & questions answered, voiced good understanding consent obtained & placed on chart. All medications and medical history reviewed. Will prep patient per orders. Patient & family updated on plan of care. The patient has a fraility score of 3-MANAGING WELL, based on patient A&Ox3, patient able to ambulate to room without difficulty.

## 2021-10-19 NOTE — ANESTHESIA POSTPROCEDURE EVALUATION
Procedure(s):  WATCHMAN RONLADO CLOSURE DEVICE. general    Anesthesia Post Evaluation      Multimodal analgesia: multimodal analgesia used between 6 hours prior to anesthesia start to PACU discharge  Patient location during evaluation: bedside  Patient participation: complete - patient participated  Level of consciousness: awake and responsive to light touch  Pain management: adequate  Airway patency: patent  Anesthetic complications: no  Cardiovascular status: acceptable, hemodynamically stable, blood pressure returned to baseline and stable  Respiratory status: acceptable, unassisted, spontaneous ventilation and nonlabored ventilation  Hydration status: acceptable        INITIAL Post-op Vital signs:   Vitals Value Taken Time   /63 10/19/21 1147   Temp 36.3 °C (97.3 °F) 10/19/21 1120   Pulse 71 10/19/21 1151   Resp 14 10/19/21 1142   SpO2 100 % 10/19/21 1157   Vitals shown include unvalidated device data.

## 2021-10-19 NOTE — Clinical Note
Transseptal Cath Performed under hemodynamic and ICE, Fluoro and PREETHI, utilizing a standard needle, Needle inserted.

## 2021-10-19 NOTE — PROCEDURES
Pre-Electrophysiology Diagnosis  1. Permanent Atrial fibrillation  2. Intolerant to long term anticoagulation     Procedure Performed  1. Transesophageal echocardiogram  2. Transeptal puncture   3. Watchman implantation  4. Intracardiac echocardiography    Cardiac Electrophysiologist: Stoney Arciniega MD  Interventional Cardiologist: Kory Carlos MD    Anesthesia: General     Estimated Blood Loss: Less than 10 mL     Specimens: * No specimens in log *    Procedure in Detail:  The patient was brought to the hybrid OR suite in the fasting state. The patient was intubated by anesthesiology and invasive arterial blood pressure monitoring obtained. A tranesophageal echocardiogram was performed directly prior to the procedure and was negative for a RONALDO thrombus (see full report in chart). Dr. Noman Bradshaw obtained venous access, placed an SLO and performed the transseptal and inserted a 10Fr ICE into the RONALDO as outlined in his procedure note. I was present and assisted with this portion of the procedure. As the primary implanting phyisician, I prepped and draped the Watchman delivery sheath and exchanged for the SLO via an Amplatz super stiff wire located in the left upper pulmonary vein. A pig tail catheter was then inserted into the delivery sheath and used to guide the delivery sheath into the appendage. Depth measurements were performed with fluoroscopy and depth and size measurements determined via PREETHI. The sheath was then advanced over the pig tail catheter into position within the RONALDO. The Watchman device was then prepped per protocol and placed into the delivery sheath via a wet to wet connection and advanced into the sheath. The sheath was then pulled back to allow delivery of the Watchman device within the left atrial appendage. Successful delivery of a 20 mm device revealed excellent PASS criteria.  Acontrast appendogram was performed revealing an adequate seal. After extensive evaluation including an excellent tug test, the device was deployed. Further measurements were taken post implant. The sheath was removed. At the completion of the Watchman implantation procedure, all catheters were removed, and a Perclose device was deployed by Dr. Navid Sutherland for hemostasis of the 16Fr short sheath and a Vascade was deployed for hemostasis of the 10Fr short sheath. The patient tolerated the procedure well with no acute complications recognized. Just prior to pulling shealths, the PREETHI was used to obtain ultrasound images and revealed no evidence of pericardial effusion. Complications: None    Summary:   1. Successful Watchman implantation  2. Family updated. Clarissa Jane MD, MS  Clinical Cardiac Electrophysiology

## 2021-10-19 NOTE — Clinical Note
Diagnostic catheter inserted. EMERGENCY DEPARTMENT ENCOUNTER    Pt Name: Stelal Don  MRN: [id number]  [place] 1968  Date of evaluation: 7/26/21  CHIEF COMPLAINT       Chief Complaint   Patient presents with    Abdominal Pain     HISTORY OF PRESENT ILLNESS   This is a 57-year-old male that presents with complaints of nausea. The patient states that he has been having some issues with nausea and vomiting ongoing over the past few weeks. This evening he began feeling ill, some nausea came over him, he had multiple episodes of emesis, nonbilious, associated with a blacking out episode. Patient denies any previous history of cardiac disease, no history of syncope. No stool changes. He denies any hematuria, melena or hematochezia. Patient describes the symptoms as severe. REVIEW OF SYSTEMS     Review of Systems   Constitutional: Negative for chills and fever. HENT: Negative for rhinorrhea and sore throat. Eyes: Negative for discharge, redness and visual disturbance. Respiratory: Negative for cough and shortness of breath. Cardiovascular: Negative for chest pain, palpitations and leg swelling. Gastrointestinal: Positive for abdominal pain, nausea and vomiting. Negative for diarrhea. Genitourinary: Negative for dysuria and hematuria. Musculoskeletal: Negative for arthralgias, myalgias and neck pain. Skin: Negative for color change and rash. Neurological: Positive for dizziness and syncope. Negative for seizures, weakness and headaches. Psychiatric/Behavioral: Negative for hallucinations, self-injury and suicidal ideas.      PASTMEDICAL HISTORY     Past Medical History:   Diagnosis Date    CKD (chronic kidney disease)     Diabetes mellitus ([facility] [facility]     Headache     Hypertension      Past Problem List  Patient Active Problem List   Diagnosis Code    Severe frontal headaches R51.9    Blurring of vision H53.8    Intractable migraine G43.919    Type 2 diabetes mellitus with stage 3 chronic kidney disease, with long-term current use of insulin ([facility] E11.22, N18.30, Z79.4    Headache R51.9    Acute kidney injury superimposed on chronic kidney disease ([facility] N17.9, N18.9    Stage 3 chronic kidney disease ([facility] N18.30    Atypical chest pain R07.89    Essential hypertension I10    Class 2 severe obesity due to excess calories with serious comorbidity and body mass index (BMI) of 35.0 to 35.9 in adult ([facility] E66.01, Z68.35     SURGICAL HISTORY       Past Surgical History:   Procedure Laterality Date    KIDNEY BIOPSY      2/2020    AL SONO GUIDE NEEDLE BIOPSY      on thyroid 11/19    ROTATOR CUFF REPAIR      ROTATOR CUFF REPAIR Right      CURRENT MEDICATIONS       Current Discharge Medication List      CONTINUE these medications which have NOT CHANGED    Details   pantoprazole (PROTONIX) 20 MG tablet       verapamil (CALAN SR) 240 MG extended release tablet       tadalafil (CIALIS) 20 MG tablet TAKE ONE-HALF TABLET BY MOUTH ONCE DAILY AS NEEDED FOR ERECTILE DYSFUNCTION      furosemide (LASIX) 40 MG tablet       SUMAtriptan (IMITREX) 100 MG tablet       lisinopril (PRINIVIL) 20 MG tablet Take 1 tablet by mouth daily  Qty: 30 tablet, Refills: 0      topiramate (TOPAMAX) 50 MG tablet Take 50 mg by mouth 2 times daily      vitamin D (CHOLECALCIFEROL) 25 MCG (1000 UT) TABS tablet Take 1,000 Units by mouth daily      rosuvastatin (CRESTOR) 10 MG tablet Take 10 mg by mouth daily      glimepiride (AMARYL) 4 MG tablet Take 8 mg by mouth every morning (before breakfast) Takes 2 tabs (=8mg) once daily      insulin glargine (LANTUS) 100 UNIT/ML injection vial Inject 25 Units into the skin every morning       butalbital-acetaminophen-caffeine (FIORICET, ESGIC) -40 MG per tablet Take 1 tablet by mouth every 4 hours as needed for Headaches  Qty: 30 tablet, Refills: 0      acetaminophen (TYLENOL) 325 MG tablet Take 2 tablets by mouth every 6 hours as needed for Pain  Qty: 20 tablet, Refills: 1      psyllium (KONSYL) 28.3 % PACK Take 1 packet by mouth daily as needed for Constipation      ondansetron (ZOFRAN ODT) 4 MG disintegrating tablet Take 1 tablet by mouth every 8 hours as needed for Nausea  Qty: 20 tablet, Refills: 0           ALLERGIES     is allergic to dexamethasone. FAMILY HISTORY     He indicated that his mother is alive. He indicated that his father is alive. SOCIAL HISTORY       Social History     Tobacco Use    Smoking status: Never Smoker    Smokeless tobacco: Never Used   Vaping Use    Vaping Use: Never used   Substance Use Topics    Alcohol use: Yes     Comment: occas    Drug use: No     PHYSICAL EXAM     INITIAL VITALS: BP (!) 168/99   Pulse 73   Temp 97.9 °F (36.6 °C) (Oral)   Resp 15   Ht 5' 9\" (1.753 m)   Wt 238 lb 1.6 oz (108 kg)   SpO2 94%   BMI 35.16 kg/m²    Physical Exam  Constitutional:       Appearance: Normal appearance. He is well-developed. He is not ill-appearing or toxic-appearing. HENT:      Head: Normocephalic and atraumatic. Eyes:      Conjunctiva/sclera: Conjunctivae normal.      Pupils: Pupils are equal, round, and reactive to light. Neck:      Trachea: Trachea normal.   Cardiovascular:      Rate and Rhythm: Normal rate and regular rhythm. Heart sounds: S1 normal and S2 normal. No murmur heard. Pulmonary:      Effort: Pulmonary effort is normal. No accessory muscle usage or respiratory distress. Breath sounds: Normal breath sounds. Chest:      Chest wall: No deformity or tenderness. Abdominal:      General: Bowel sounds are normal. There is no distension or abdominal bruit. Palpations: Abdomen is not rigid. Tenderness: There is abdominal tenderness in the epigastric area. There is no guarding or rebound. Negative signs include Ayala's sign and McBurney's sign. Musculoskeletal:      Cervical back: Normal range of motion and neck supple. Skin:     General: Skin is warm. Findings: No rash.    Neurological:      Mental Status: He is alert and oriented to person, place, and time. GCS: GCS eye subscore is 4. GCS verbal subscore is 5. GCS motor subscore is 6. Psychiatric:         Speech: Speech normal.         MEDICAL DECISION MAKINyear-old presents with complaints of generalized weakness, nausea vomiting a syncopal episode. Plan is basic labs cardiac enzymes and reevaluation. 5:11 AM EDT  Patient's blood sugar is improving slowly, patient's troponin is negative, laboratory studies unremarkable, CT scan shows no evidence of acute pathology. Plan is discharged with outpatient follow-up. Patient does have an elevated blood pressure, he has not taken his blood pressure medications today, I recommended he follow-up as an outpatient, he has no active chest pain headache numbness or tingling to suggest a stroke or hypertensive urgency. CRITICAL CARE:       PROCEDURES:    Procedures    DIAGNOSTIC RESULTS   EKG:All EKG's are interpreted by the Emergency Department Physician who either signs or Co-signs this chart in the absence of a cardiologist.    Patient's EKG shows sinus rhythm at rate of 78 AL QRS QTC intervals unremarkable, the patient has normal axis no ST elevations or depressions, no significant T wave changes. Nonspecific EKG. RADIOLOGY:All plain film, CT, MRI, and formal ultrasound images (except ED bedside ultrasound) are read by the radiologist, see reports below, unless otherwisenoted in MDM or here. CT ABDOMEN PELVIS W IV CONTRAST Additional Contrast? None   Final Result   1. Marked sigmoid colon diverticulosis without evidence of diverticulitis. 2. Hepatic steatosis. 3. Hepatomegaly. 4. Normal appendix. XR CHEST PORTABLE   Final Result   No acute abnormality. LABS: All lab results were reviewed by myself, and all abnormals are listed below.   Labs Reviewed   BASIC METABOLIC PANEL - Abnormal; Notable for the following components:       Result Value    Glucose 514 (*)     BUN 29 (*) CREATININE 2.24 (*)     Sodium 128 (*)     Chloride 92 (*)     GFR Non- 31 (*)     GFR  37 (*)     All other components within normal limits   CBC WITH AUTO DIFFERENTIAL - Abnormal; Notable for the following components:    Hemoglobin 12.3 (*)     Hematocrit 35.7 (*)     MCV 80.8 (*)     Seg Neutrophils 80 (*)     Lymphocytes 11 (*)     Immature Granulocytes 1 (*)     Absolute Lymph # 0.92 (*)     All other components within normal limits   LIPASE - Abnormal; Notable for the following components:    Lipase 66 (*)     All other components within normal limits   POC GLUCOSE FINGERSTICK - Abnormal; Notable for the following components:    POC Glucose 475 (*)     All other components within normal limits   POC GLUCOSE FINGERSTICK - Abnormal; Notable for the following components:    POC Glucose 422 (*)     All other components within normal limits   POCT GLUCOSE - Normal   MAGNESIUM   TROPONIN   TROPONIN   LACTIC ACID   LACTIC ACID   HEPATIC FUNCTION PANEL       EMERGENCY DEPARTMENTCOURSE:         Vitals:    Vitals:    07/26/21 0059 07/26/21 0200   BP: (!) 174/105 (!) 168/99   Pulse: 80 73   Resp: 18 15   Temp: 97.9 °F (36.6 °C)    TempSrc: Oral    SpO2: 98% 94%   Weight: 238 lb 1.6 oz (108 kg)    Height: 5' 9\" (1.753 m)        The patient was given the following medications while in the emergency department:  Orders Placed This Encounter   Medications    AND Linked Order Group     pantoprazole (PROTONIX) injection 40 mg     sodium chloride (PF) 0.9 % injection 10 mL    ondansetron (ZOFRAN) injection 4 mg    0.9 % sodium chloride bolus    0.9 % sodium chloride bolus    sodium chloride flush 0.9 % injection 10 mL    iopamidol (ISOVUE-370) 76 % injection 75 mL    0.9 % sodium chloride bolus    insulin lispro (HUMALOG) injection vial 10 Units    acetaminophen (TYLENOL) tablet 1,000 mg     CONSULTS:  None    FINAL IMPRESSION      1. Epigastric pain    2.  Hyperglycemia without ketosis          DISPOSITION/PLAN   DISPOSITION Decision To Discharge 07/26/2021 05:10:22 AM      PATIENT REFERRED TO:  James Berger MD  [address] [facility] [phone]    Schedule an appointment as soon as possible for a visit in 2 days      DISCHARGE MEDICATIONS:  Current Discharge Medication List        Pamela Alves MD  Attending Emergency Physician                   Pamela Alves MD  07/26/21 [id number]

## 2021-10-19 NOTE — PERIOP NOTES
Lt radial art line removed, pressure held x 15 minutes. Pressure bandage applied using 2x2 and clear plastic tape. Site clean dry and intact, no bleeding or hematoma at this time.

## 2021-10-19 NOTE — Clinical Note
TRANSFER - IN REPORT:     Verbal report received from: CPRU. Report consisted of patient's Situation, Background, Assessment and   Recommendations(SBAR). Opportunity for questions and clarification was provided. Assessment completed upon patient's arrival to unit and care assumed. Patient transported with a Registered Nurse.

## 2021-10-19 NOTE — Clinical Note
TRANSFER - OUT REPORT:     Verbal report given to: PACU. Report consisted of patient's Situation, Background, Assessment and   Recommendations(SBAR). Opportunity for questions and clarification was provided. Patient transported with a Registered Nurse.

## 2021-10-19 NOTE — DISCHARGE SUMMARY
[address] [place] [address] Cardiology Discharge Summary     Patient ID:  Olu John  [id number]  66 y.o.  1950    Admit date: 10/19/2021    Discharge date:  10/19/21     Admitting Physician: Que Nicole MD     Discharge Physician: TONYA Rainey/Dr. Erika De La Rosa     Admission Diagnoses: Permanent atrial fibrillation [facility]) [I48.21]    Discharge Diagnoses:    Diagnosis    Permanent atrial fibrillation ([address]    Intolerant to long term anticoagulation      Cardiology Procedures this admission:  PREETHI, Implantation of Watchman device    Consults: none    Hospital Course: Patient was seen at the office of [address] [place] [address] Cardiology by Dr. Erika De La Rosa in follow up for consideration for watchman device. The patient presented for procedure. PREETHI was performed directly prior to procedure that was negative for RONALDO thrombus. The patient underwent successful implantation of a 20mm Watchman device. Just prior to pulling shealths, the PREETHI was used to obtain ultrasound images and revealed no evidence of pericardial effusion. The patient was monitored closely in recovery. Later that afternoon, the patient was up feeling well without any complaints of chest pain or shortness of breath. Patient's labs were stable. Patient was seen and examined by Dr. Erika De La Rosa and determined stable and ready for discharge. Patient was instructed on the importance of medication compliance. The patient will be discharged home on Eliquis and ASA 81mg. ASA 81mg will be continued indefinitely. The Eliquis will be continued for 45 days until RONALDO seal is examined using PREETHI. Once RONALDO is noted to be sealed, Eliquis will be discontinued, and Plavix 75mg will be started and will be continued until 6 months post watchman implant. This medication plan is due to non hemorrhagic complications in the past.      DISPOSITION: The patient is being discharged home in stable condition on a low saturated fat, low cholesterol and low salt diet.  The patient is instructed to advance activities as tolerated to the limit of fatigue or shortness of breath. The patient is instructed to avoid lifting anything heavier than 10 lbs for 1 weeks. The patient is instructed to avoid any straining, stooping or squatting for 2 weeks. The patient is instructed not to drive for 1 week. The patient is instructed to watch the groin site for bleeding/oozing; if seen, the patient is instructed to apply firm pressure with a clean cloth and call [facility]iology at [phone]. The patient is instructed to watch for signs of infection which include: increasing area of redness, fever/hot to touch or purulent drainage at the groin site. The patient is instructed not to soak in a bathtub for 7-10 days, but is cleared to shower. The patient is instructed to return to the ER immediately for any severe pain, color change, or temperature change in leg. The patient is informed not to stop any medications without discussing with our office and to contact our office if any dental work or possible surgeries are expected    Discharge Exam:   Visit Vitals  /77   Pulse 75   Temp 97.3 °F (36.3 °C)   Resp 16   Ht 5' 3\" (1.6 m)   Wt 98.9 kg (218 lb)   SpO2 97%   BMI 38.62 kg/m²   Patient has been seen by Dr. Martha Palomino: see his progress note for exam details. Recent Results (from the past 24 hour(s))   RBC, ALLOCATE    Collection Time: 10/18/21  4:00 PM   Result Value Ref Range    HISTORY CHECKED?  Historical check performed    EKG, 12 LEAD, INITIAL    Collection Time: 10/19/21  8:06 AM   Result Value Ref Range    Ventricular Rate 63 BPM    Atrial Rate 300 BPM    QRS Duration 106 ms    Q-T Interval 454 ms    QTC Calculation (Bezet) 464 ms    Calculated R Axis -29 degrees    Calculated T Axis -7 degrees    Diagnosis       Atrial fibrillation  Minimal voltage criteria for LVH, may be normal variant  Cannot rule out Anterior infarct , age undetermined  Abnormal ECG  When compared with ECG of 03-AUG-2021 08:26,  Inverted T waves have replaced nonspecific T wave abnormality in Inferior   leads  Nonspecific T wave abnormality now evident in Anterior leads  Confirmed by ST JENNIFER CROW MD (), JULIANN CHAMBERS ([id number]) on 10/19/2021 10:44:23 AM     POC ACTIVATED CLOTTING TIME    Collection Time: 10/19/21  9:58 AM   Result Value Ref Range    Activated Clotting Time (POC) 246 (H) 70 - 128 SECS     Patient Instructions:   Current Discharge Medication List      CONTINUE these medications which have NOT CHANGED    Details   aspirin delayed-release 81 mg tablet Take 1 Tablet by mouth daily. Qty: 30 Tablet, Refills: 0  Start date: 10/19/2021      nabumetone (RELAFEN) 750 mg tablet Take 750 mg by mouth two (2) times a day. As needed      losartan (COZAAR) 25 mg tablet TAKE ONE TABLET BY MOUTH ONCE DAILY. Qty: 90 Tablet, Refills: 3      apixaban (ELIQUIS) 5 mg tablet Take 1 Tablet by mouth two (2) times a day. Qty: 180 Tablet, Refills: 3      metoprolol succinate (TOPROL-XL) 25 mg XL tablet Take 1 Tablet by mouth daily. 1/2 qd  Qty: 90 Tablet, Refills: 3      furosemide (Lasix) 40 mg tablet Take 1 Tablet by mouth daily. Qty: 90 Tablet, Refills: 3      potassium chloride SR (KLOR-CON 10) 10 mEq tablet Take 1 Tablet by mouth daily. Qty: 90 Tablet, Refills: 3      spironolactone (ALDACTONE) 25 mg tablet Take 1 Tablet by mouth daily. Qty: 90 Tablet, Refills: 3      polyethylene glycol (Miralax) 17 gram/dose powder Take 17 g by mouth daily. multivitamin (ONE A DAY) tablet Take 1 Tab by mouth daily. estradiol (ESTRACE) 2 mg tablet Take 1 mg by mouth daily. 1/2 tablet daily      fluticasone (FLONASE) 50 mcg/actuation nasal spray 2 Sprays by Both Nostrils route daily. montelukast (SINGULAIR) 10 mg tablet Take 10 mg by mouth daily. omeprazole (PRILOSEC) 20 mg capsule Take 20 mg by mouth every other day.       nitroglycerin (NITROSTAT) 0.4 mg SL tablet 1 Tablet by SubLINGual route every five (5) minutes as needed for Chest Pain.  Qty: 25 Tablet, Refills: 11               Signed:  Aaron Lopez PA-C  10/19/2021  2:40 PM

## 2021-10-19 NOTE — PROGRESS NOTES
TRANSFER - OUT REPORT:    Watchkrystle Jane/[employer]  Deployment successful  16 fr RFV Perclosed  10 fr RFV vascade closure      Verbal report given to RN(name) on [place]  being transferred to PACU(unit) for routine progression of care       Report consisted of patients Situation, Background, Assessment and   Recommendations(SBAR). Information from the following report(s) SBAR and Procedure Summary was reviewed with the receiving nurse. Lines:   Peripheral IV 10/19/21 Right Antecubital (Active)       Peripheral IV 10/19/21 Left;Posterior Hand (Active)       Arterial Line 10/19/21 Radial artery (Active)        Opportunity for questions and clarification was provided.

## 2021-10-19 NOTE — Clinical Note
Addended by: RAMON BARKLEY on: 9/19/2019 10:50 AM     Modules accepted: Orders     Sheath #2: Sheath: removed. Hemostasis achieved.

## 2021-10-19 NOTE — PERIOP NOTES
TRANSFER - OUT REPORT:    Verbal report given to COURT Puga on Coral Chavez  being transferred to Cath lab for routine post - op       Report consisted of patients Situation, Background, Assessment and   Recommendations(SBAR). Information from the following report(s) OR Summary, Procedure Summary, Intake/Output and MAR was reviewed with the receiving nurse. Lines:   Peripheral IV 10/19/21 Right Antecubital (Active)   Site Assessment Clean, dry, & intact 10/19/21 1050   Phlebitis Assessment 0 10/19/21 1050   Infiltration Assessment 0 10/19/21 1050   Dressing Status Clean, dry, & intact 10/19/21 1050   Dressing Type Tape;Transparent 10/19/21 1050   Hub Color/Line Status Patent 10/19/21 1050       Peripheral IV 10/19/21 Left;Posterior Hand (Active)   Site Assessment Clean, dry, & intact 10/19/21 1050   Phlebitis Assessment 0 10/19/21 1050   Infiltration Assessment 0 10/19/21 1050   Dressing Status Clean, dry, & intact 10/19/21 1050   Dressing Type Tape;Transparent 10/19/21 1050   Hub Color/Line Status Patent 10/19/21 1050       Arterial Line 10/19/21 Radial artery (Active)   Dressing Status Clean, dry, & intact 10/19/21 1050   Dressing Type Tape;Transparent 10/19/21 1050   Line Status Intact and in place 10/19/21 1050   Treatment Zeroed or re-zeroed 10/19/21 1050   Affected Extremity/Extremities Pulses palpable;Color distal to insertion site pink (or appropriate for race) 10/19/21 1050        Opportunity for questions and clarification was provided. Patient transported with:   O2 @ 3 liters    VTE prophylaxis orders have not been written for Jeri Arevalo.

## 2021-10-19 NOTE — PROGRESS NOTES
Received from PACU for continued care following [address]. Right groin site intact with no bleeding or swelling noted.  No complaints of pain on arrival.

## 2021-10-19 NOTE — PROGRESS NOTES
Assisted to ambulate in hallway with no bleeding or swelling noted from right groin site. Discharge instructions given.  at bedside. No complaints voiced.

## 2021-10-19 NOTE — DISCHARGE INSTRUCTIONS
Watchman Discharge Instructions      Activity:     Follow your doctors recommendations   Return to normal activities gradually, pacing yourself as you feel better, resting when tired. · Activity should be limited for the next 48 hours. Climb stairs as little as possible and avoid any stooping, bending or strenuous activity for 48 hours. No heavy lifting (anything over 10 pounds) for five days. · Do not drive for 48 [address]Have a responsible person drive you home and stay with you for at least 24 hours after your heart catheterization/angiography. · Check the puncture site frequently for swelling or bleeding. If you see any bleeding, lie down and apply pressure over the area with a clean town or washcloth. Notify your doctor for any redness, swelling, drainage or oozing from the puncture site. Notify your doctor for any fever or chills. · You may resume your usual diet. Drink more fluids than usual.        Incision / Wound Care       Cleanse wounds with mild soap and water. Keep wound dry.  A small amount of bloody or clear drainage is normal.   Watch for redness, swelling, incision site hot to touch, foul or colored drainage from the incision site, these are all signs of infection and should be reported immediately to the physicians.  If there is site concern please notify your implanting physician.  You may remove the bandage from your Right and Groin in 24 hours. You may shower in 24 hours. No tub baths, hot tubs or swimming for one week. Do not place any lotions, creams, powders, ointments over the puncture site for one week. You may place a clean band-aid over the puncture site each day for 5 days. Change this daily. Continued        Medications:   DO NOT STOP TAKING YOUR Blood Thinner  OR ASPIRIN (if on aspirin)  WITHOUT SPEAKING WITH YOUR CARDIOLOGIST FIRST!  Take your medications as ordered at the time of discharge.    Do NOT stop taking any medications without first discussing it with your doctor.  Notify all your doctors of current medication lists. Follow instructions on medication administration especially if blood thinning medications are prescribed. Your doctor will monitor your medications and advise you when or if you can stop taking them. Notify your doctor immediately if any of the following:   Sudden weight gain   Increasing shortness of breath   Pain, change in color, temperature or swelling in lower legs or feet.  Fevers greater than 101 degrees, redness, swelling, incision site hot to touch, foul or colored drainage from the incision site, these are all signs of infection and should be reported immediately to the physicians. Post Watchman Discharge Instructions Timeline     After a minimum of 45 days from date of procedure you will need to return to hospital to have an outpatient PREETHI performed to determine if the implant has closed the opening of the appendage. At this time you will see the [place] [id number]. Coordinator for a follow up. If the PREETHI reveals the appendage is not fully closed - you will require another PREETHI at a later date to reassess. Once the results from PREETHI have been reviewed the physicians will determine what medication changes need to be made. Your [facility] Coordinator can facilitate arranging these appointments.  6 months post implant you will need to see the [facility] Coordinator and visit the physician for a routine follow up and potentially EKG, and lab work. Your Coordinator can facilitate with setting up these appointments.  At 1 and 2 years post implant you will be contacted by your [place] [id number]. Coordinator for a brief interview. This allows us to complete required patient monitoring.        Prior to any dental work or surgery notify your dentist or surgeon about your Watchman implant and the medications you are on.     Maintain regular follow up visits with your cardiologist     Keep all bloodwork appointments. Thank you for entrusting us with your care! You received a medication, called Sugammadex, in the OR; this medication has been known to interfere with oral contraceptives (birth control pills). Please use a back up birth control method for a minimum of 7 days.

## 2021-10-19 NOTE — Clinical Note
General Health and concerns:  HEART HEALTHY DIET:  A heart healthy diet is one that is low in cholesterol (less than 300 mg daily), fat (less than 80 g daily) . You should also minimize carbohydrates / sugars (less amounts of breads, pastas, potato and potato products and sugary foods/snacks, cookies, cakes, etc) . Try to eat whole wheat/multigrain breads and pastas and eat more vegetables. Cook with olive oil (or no oil) and grill, bake, broil or boil foods. Less red meat and more chicken , fish and lean cuts of beef (limited). 8567-2153 calories per day is sufficient 2220-4651 is acceptable for weight loss. EXERCISE:  You should do exercise 3-5 days per week (minimum) to include increasing your heart rate for 30 to 45 minutes. At least a pace of a brisk walk should do that. This build up your heart and lung endurance and muscles and helps many function of the body. OTHER:        Routine Health maintenance: You need to get a yearly follow up/physical exam to review, discuss age and gender appropriate exams, labs, vaccines and screening tests. This includes cardiovascular health risk, cancer screens and other clive related topics. Medications-Take all medications as directed. Please do not stop unless you talk to your doctor or health care provider first. Report any problems immediately. Referrals: if you have been given a referral, please call the office if you do not hear from provider in one week. You may make the appointment yourself. Please keep all appointments with specialists and ask them to send their notes, thoughts, recommendations to us , as your PCP. Imaging/Labs:  Be sure to get these images in a timely manner. IF your test must be scheduled, let us know if you need help getting this done and if you do not hear from that provider in a week , call us or them.   BE SURE to call the office if you do not hear regarding the results in one week after the test Sheath #2: Sheath: inserted. Sheath inserted/placed in the right femoral  vein. Hemostasis achieved. is performed Image or lab). It is our intention to inform you of the results ALWAYS, even if normal you should get a notification (Call, portal message). PLEASE wilfrido if you do not get the results. PLEASE follow all recommendations and call/come in /ask questions if you do not understand of if problems develop after or in between visits. Failure to comply with recommended health care advise could result in serious health consequences. Thank you for choosing our practice and please let us know how we can help you feel better and stay well!

## 2021-10-20 ENCOUNTER — TELEPHONE (OUTPATIENT)
Dept: CARDIAC CATH/INVASIVE PROCEDURES | Age: 71
End: 2021-10-20

## 2021-10-20 LAB
ABO + RH BLD: NORMAL
BLD PROD TYP BPU: NORMAL
BLOOD GROUP ANTIBODIES SERPL: NORMAL
BPU ID: NORMAL
CROSSMATCH RESULT,%XM: NORMAL
SPECIMEN EXP DATE BLD: NORMAL
STATUS OF UNIT,%ST: NORMAL
UNIT DIVISION, %UDIV: 0

## 2021-10-20 NOTE — TELEPHONE ENCOUNTER
Watchman implant follow up      Patient states she is doing well. Her right leg is tender, but no real pain. She does have dried blood on her dressing, but denies active bleeding, hematoma or swelling to right groin. She denies chest pain or shortness of breath. She has restarted her eliquis. She does not have any questions or concerns at this time.

## 2021-10-25 ENCOUNTER — APPOINTMENT (OUTPATIENT)
Dept: GENERAL RADIOLOGY | Age: 71
DRG: 392 | End: 2021-10-25
Attending: EMERGENCY MEDICINE
Payer: MEDICARE

## 2021-10-25 ENCOUNTER — HOSPITAL ENCOUNTER (EMERGENCY)
Dept: NON INVASIVE DIAGNOSTICS | Age: 71
Discharge: HOME | DRG: 392 | End: 2021-10-25
Attending: STUDENT IN AN ORGANIZED HEALTH CARE EDUCATION/TRAINING PROGRAM
Payer: MEDICARE

## 2021-10-25 ENCOUNTER — HOSPITAL ENCOUNTER (INPATIENT)
Age: 71
LOS: 2 days | Discharge: HOME | DRG: 392 | End: 2021-10-29
Attending: STUDENT IN AN ORGANIZED HEALTH CARE EDUCATION/TRAINING PROGRAM | Admitting: INTERNAL MEDICINE
Payer: MEDICARE

## 2021-10-25 VITALS
WEIGHT: 215 LBS | DIASTOLIC BLOOD PRESSURE: 75 MMHG | HEIGHT: 63 IN | SYSTOLIC BLOOD PRESSURE: 115 MMHG | BODY MASS INDEX: 38.09 KG/M2

## 2021-10-25 DIAGNOSIS — I48.21 PERMANENT ATRIAL FIBRILLATION (HCC): ICD-10-CM

## 2021-10-25 DIAGNOSIS — R07.2 PRECORDIAL PAIN: ICD-10-CM

## 2021-10-25 DIAGNOSIS — R55 NEAR SYNCOPE: ICD-10-CM

## 2021-10-25 DIAGNOSIS — K58.8 OTHER IRRITABLE BOWEL SYNDROME: ICD-10-CM

## 2021-10-25 DIAGNOSIS — R53.82 CHRONIC FATIGUE: ICD-10-CM

## 2021-10-25 DIAGNOSIS — R07.9 CHEST PAIN, UNSPECIFIED TYPE: Primary | ICD-10-CM

## 2021-10-25 LAB
ALBUMIN SERPL-MCNC: 3.1 G/DL (ref 3.2–4.6)
ALBUMIN/GLOB SERPL: 0.8 {RATIO} (ref 1.2–3.5)
ALP SERPL-CCNC: 82 U/L (ref 50–136)
ALT SERPL-CCNC: 17 U/L (ref 12–65)
ANION GAP SERPL CALC-SCNC: 8 MMOL/L (ref 7–16)
AST SERPL-CCNC: 15 U/L (ref 15–37)
BASOPHILS # BLD: 0.1 K/UL (ref 0–0.2)
BASOPHILS NFR BLD: 1 % (ref 0–2)
BILIRUB SERPL-MCNC: 0.5 MG/DL (ref 0.2–1.1)
BUN SERPL-MCNC: 15 MG/DL (ref 8–23)
CALCIUM SERPL-MCNC: 9.4 MG/DL (ref 8.3–10.4)
CHLORIDE SERPL-SCNC: 103 MMOL/L (ref 98–107)
CO2 SERPL-SCNC: 26 MMOL/L (ref 21–32)
CREAT SERPL-MCNC: 1.09 MG/DL (ref 0.6–1)
DIFFERENTIAL METHOD BLD: ABNORMAL
ECHO AO ASC DIAM: 3.2 CM
ECHO AO ROOT DIAM: 3.2 CM
ECHO AV AREA PEAK VELOCITY: 1.68 CM2
ECHO AV AREA VTI: 1.55 CM2
ECHO AV AREA/BSA PEAK VELOCITY: 0.8 CM2/M2
ECHO AV AREA/BSA VTI: 0.8 CM2/M2
ECHO AV MEAN GRADIENT: 4 MMHG
ECHO AV MEAN GRADIENT: 4 MMHG
ECHO AV PEAK GRADIENT: 8 MMHG
ECHO AV PEAK VELOCITY: 138 CM/S
ECHO AV VTI: 33.2 CM
ECHO EST RA PRESSURE: 8 MMHG
ECHO LA AREA 2C: 24.8 CM2
ECHO LA AREA 4C: 23.5 CM2
ECHO LA MAJOR AXIS: 6.25 CM
ECHO LA MINOR AXIS: 3.14 CM
ECHO LV E' LATERAL VELOCITY: 12.6 CM/S
ECHO LV E' SEPTAL VELOCITY: 9.67 CM/S
ECHO LV EDV A2C: 96.4 CM3
ECHO LV EDV A4C: 79 CM3
ECHO LV ESV A2C: 40.7 CM3
ECHO LV ESV A4C: 35.4 CM3
ECHO LV INTERNAL DIMENSION DIASTOLIC: 4.76 CM (ref 3.9–5.3)
ECHO LV INTERNAL DIMENSION SYSTOLIC: 2.94 CM
ECHO LV IVSD: 1.06 CM (ref 0.6–0.9)
ECHO LV MASS 2D: 165.6 G (ref 67–162)
ECHO LV MASS INDEX 2D: 83.2 G/M2 (ref 43–95)
ECHO LV POSTERIOR WALL DIASTOLIC: 0.92 CM (ref 0.6–0.9)
ECHO LVOT DIAM: 1.7 CM
ECHO LVOT PEAK GRADIENT: 4 MMHG
ECHO LVOT VTI: 22.7 CM
ECHO MV A VELOCITY: 47 CM/S
ECHO MV E DECELERATION TIME (DT): 211 MS
ECHO MV E VELOCITY: 93.5 CM/S
ECHO MV E/A RATIO: 1.99
ECHO MV E/E' LATERAL: 7.42
ECHO MV E/E' RATIO (AVERAGED): 8.54
ECHO MV E/E' SEPTAL: 9.67
ECHO PV REGURGITANT MAX VELOCITY: 140 CM/S
ECHO RIGHT VENTRICULAR SYSTOLIC PRESSURE (RVSP): 51 MMHG
ECHO RV INTERNAL DIMENSION: 3.2 CM
ECHO RV TAPSE: 2.35 CM (ref 1.5–2)
ECHO TV REGURGITANT MAX VELOCITY: 326 CM/S
ECHO TV REGURGITANT PEAK GRADIENT: 43 MMHG
EOSINOPHIL # BLD: 0.2 K/UL (ref 0–0.8)
EOSINOPHIL NFR BLD: 3 % (ref 0.5–7.8)
ERYTHROCYTE [DISTWIDTH] IN BLOOD BY AUTOMATED COUNT: 13.7 % (ref 11.9–14.6)
GLOBULIN SER CALC-MCNC: 4.1 G/DL (ref 2.3–3.5)
GLUCOSE SERPL-MCNC: 97 MG/DL (ref 65–100)
HCT VFR BLD AUTO: 38.1 % (ref 35.8–46.3)
HGB BLD-MCNC: 11.8 G/DL (ref 11.7–15.4)
IMM GRANULOCYTES # BLD AUTO: 0.1 K/UL (ref 0–0.5)
IMM GRANULOCYTES NFR BLD AUTO: 1 % (ref 0–5)
LIPASE SERPL-CCNC: 80 U/L (ref 73–393)
LYMPHOCYTES # BLD: 1.8 K/UL (ref 0.5–4.6)
LYMPHOCYTES NFR BLD: 24 % (ref 13–44)
MAGNESIUM SERPL-MCNC: 2.4 MG/DL (ref 1.8–2.4)
MCH RBC QN AUTO: 28.4 PG (ref 26.1–32.9)
MCHC RBC AUTO-ENTMCNC: 31 G/DL (ref 31.4–35)
MCV RBC AUTO: 91.8 FL (ref 79.6–97.8)
MONOCYTES # BLD: 0.7 K/UL (ref 0.1–1.3)
MONOCYTES NFR BLD: 9 % (ref 4–12)
NEUTS SEG # BLD: 4.8 K/UL (ref 1.7–8.2)
NEUTS SEG NFR BLD: 63 % (ref 43–78)
NRBC # BLD: 0 K/UL (ref 0–0.2)
PLATELET # BLD AUTO: 274 K/UL (ref 150–450)
PMV BLD AUTO: 9.6 FL (ref 9.4–12.3)
POTASSIUM SERPL-SCNC: 4.1 MMOL/L (ref 3.5–5.1)
PROT SERPL-MCNC: 7.2 G/DL (ref 6.3–8.2)
RBC # BLD AUTO: 4.15 M/UL (ref 4.05–5.2)
SODIUM SERPL-SCNC: 137 MMOL/L (ref 136–145)
TROPONIN-HIGH SENSITIVITY: 7.7 PG/ML (ref 0–14)
TROPONIN-HIGH SENSITIVITY: 8.7 PG/ML (ref 0–14)
WBC # BLD AUTO: 7.7 K/UL (ref 4.3–11.1)

## 2021-10-25 PROCEDURE — 99218 HC RM OBSERVATION: CPT

## 2021-10-25 PROCEDURE — C8929 TTE W OR WO FOL WCON,DOPPLER: HCPCS

## 2021-10-25 PROCEDURE — 71046 X-RAY EXAM CHEST 2 VIEWS: CPT

## 2021-10-25 PROCEDURE — 83690 ASSAY OF LIPASE: CPT

## 2021-10-25 PROCEDURE — 99220 PR INITIAL OBSERVATION CARE/DAY 70 MINUTES: CPT | Performed by: INTERNAL MEDICINE

## 2021-10-25 PROCEDURE — 85025 COMPLETE CBC W/AUTO DIFF WBC: CPT

## 2021-10-25 PROCEDURE — 74011000250 HC RX REV CODE- 250: Performed by: STUDENT IN AN ORGANIZED HEALTH CARE EDUCATION/TRAINING PROGRAM

## 2021-10-25 PROCEDURE — 80053 COMPREHEN METABOLIC PANEL: CPT

## 2021-10-25 PROCEDURE — 93005 ELECTROCARDIOGRAM TRACING: CPT

## 2021-10-25 PROCEDURE — 74011250636 HC RX REV CODE- 250/636: Performed by: STUDENT IN AN ORGANIZED HEALTH CARE EDUCATION/TRAINING PROGRAM

## 2021-10-25 PROCEDURE — 83735 ASSAY OF MAGNESIUM: CPT

## 2021-10-25 PROCEDURE — 99284 EMERGENCY DEPT VISIT MOD MDM: CPT

## 2021-10-25 PROCEDURE — 84484 ASSAY OF TROPONIN QUANT: CPT

## 2021-10-25 RX ORDER — ONDANSETRON 2 MG/ML
4 INJECTION INTRAMUSCULAR; INTRAVENOUS
Status: DISCONTINUED | OUTPATIENT
Start: 2021-10-25 | End: 2021-10-29 | Stop reason: HOSPADM

## 2021-10-25 RX ORDER — ESTRADIOL 1 MG/1
1 TABLET ORAL DAILY
Status: DISCONTINUED | OUTPATIENT
Start: 2021-10-26 | End: 2021-10-29 | Stop reason: HOSPADM

## 2021-10-25 RX ORDER — NITROGLYCERIN 0.4 MG/1
0.1 TABLET SUBLINGUAL
Status: DISCONTINUED | OUTPATIENT
Start: 2021-10-25 | End: 2021-10-25

## 2021-10-25 RX ORDER — METOPROLOL SUCCINATE 25 MG/1
25 TABLET, EXTENDED RELEASE ORAL DAILY
Status: DISCONTINUED | OUTPATIENT
Start: 2021-10-26 | End: 2021-10-28

## 2021-10-25 RX ORDER — SODIUM CHLORIDE 0.9 % (FLUSH) 0.9 %
5-10 SYRINGE (ML) INJECTION AS NEEDED
Status: DISCONTINUED | OUTPATIENT
Start: 2021-10-25 | End: 2021-10-29 | Stop reason: HOSPADM

## 2021-10-25 RX ORDER — FLUTICASONE PROPIONATE 50 MCG
2 SPRAY, SUSPENSION (ML) NASAL DAILY
Status: DISCONTINUED | OUTPATIENT
Start: 2021-10-26 | End: 2021-10-29 | Stop reason: HOSPADM

## 2021-10-25 RX ORDER — POLYETHYLENE GLYCOL 3350 17 G/17G
17 POWDER, FOR SOLUTION ORAL DAILY
Status: DISCONTINUED | OUTPATIENT
Start: 2021-10-26 | End: 2021-10-29 | Stop reason: HOSPADM

## 2021-10-25 RX ORDER — PANTOPRAZOLE SODIUM 40 MG/1
40 TABLET, DELAYED RELEASE ORAL
Status: DISCONTINUED | OUTPATIENT
Start: 2021-10-26 | End: 2021-10-27

## 2021-10-25 RX ORDER — MONTELUKAST SODIUM 10 MG/1
10 TABLET ORAL DAILY
Status: DISCONTINUED | OUTPATIENT
Start: 2021-10-26 | End: 2021-10-29 | Stop reason: HOSPADM

## 2021-10-25 RX ORDER — NITROGLYCERIN 0.4 MG/1
0.4 TABLET SUBLINGUAL
Status: DISCONTINUED | OUTPATIENT
Start: 2021-10-25 | End: 2021-10-29 | Stop reason: HOSPADM

## 2021-10-25 RX ORDER — ASPIRIN 81 MG/1
81 TABLET ORAL DAILY
Status: DISCONTINUED | OUTPATIENT
Start: 2021-10-26 | End: 2021-10-29 | Stop reason: HOSPADM

## 2021-10-25 RX ORDER — SODIUM CHLORIDE 0.9 % (FLUSH) 0.9 %
5-40 SYRINGE (ML) INJECTION AS NEEDED
Status: DISCONTINUED | OUTPATIENT
Start: 2021-10-25 | End: 2021-10-29 | Stop reason: HOSPADM

## 2021-10-25 RX ORDER — SPIRONOLACTONE 25 MG/1
25 TABLET ORAL DAILY
Status: DISCONTINUED | OUTPATIENT
Start: 2021-10-26 | End: 2021-10-28

## 2021-10-25 RX ORDER — SODIUM CHLORIDE 0.9 % (FLUSH) 0.9 %
5-10 SYRINGE (ML) INJECTION EVERY 8 HOURS
Status: DISCONTINUED | OUTPATIENT
Start: 2021-10-25 | End: 2021-10-29 | Stop reason: HOSPADM

## 2021-10-25 RX ORDER — SODIUM CHLORIDE 0.9 % (FLUSH) 0.9 %
5-40 SYRINGE (ML) INJECTION AS NEEDED
Status: DISCONTINUED | OUTPATIENT
Start: 2021-10-25 | End: 2021-10-27

## 2021-10-25 RX ORDER — SODIUM CHLORIDE 0.9 % (FLUSH) 0.9 %
5-40 SYRINGE (ML) INJECTION EVERY 8 HOURS
Status: DISCONTINUED | OUTPATIENT
Start: 2021-10-25 | End: 2021-10-29 | Stop reason: HOSPADM

## 2021-10-25 RX ORDER — LOSARTAN POTASSIUM 25 MG/1
25 TABLET ORAL DAILY
Status: DISCONTINUED | OUTPATIENT
Start: 2021-10-26 | End: 2021-10-28

## 2021-10-25 RX ORDER — SODIUM CHLORIDE 0.9 % (FLUSH) 0.9 %
5-40 SYRINGE (ML) INJECTION EVERY 8 HOURS
Status: DISCONTINUED | OUTPATIENT
Start: 2021-10-26 | End: 2021-10-27

## 2021-10-25 RX ORDER — NABUMETONE 750 MG/1
750 TABLET, FILM COATED ORAL
Status: DISCONTINUED | OUTPATIENT
Start: 2021-10-25 | End: 2021-10-29 | Stop reason: HOSPADM

## 2021-10-25 RX ADMIN — PERFLUTREN 1 ML: 6.52 INJECTION, SUSPENSION INTRAVENOUS at 16:25

## 2021-10-25 NOTE — H&P
[facility] Cardiology History & Physical      Date of  Admission: 10/25/2021  1:15 PM     Primary Care Physician: Dr. Michael Gilliam  Primary Cardiologist: Dr. Radha Jones  Referring Physician: Dr. Miguel Pettit [facility] ED)  Admitting Physician: Dr. Paulina De    CC: Chest pain, SOB, near syncope    HPI:  Lupe Ward is a 66 y.o. WF with h/o permanent AF s/p recent Watchman placement on 10/19 (20 mm device), h/o NICM with improvement in EF to 55-60% (on 10/19 PREETHI), PHTN, HTN, LAKEISHA, dyslipidemia, minimal CAD by [facility] 2016, obesity s/p gastric bypass 2006 and mild chronic renal insufficiency who had a CP, SOB and near syncopal episode on Saturday. She reports she had done well after watchman until she went to exercise class on Saturday. She had acute onset of lightheadedness/dizziness, diaphoresis, CP and SOB, then felt like she was going to pass out and sat down. She improved and went home for rest. She rested on Saturday and Sunday. This morning in the shower, she developed a similar episode. She describes 8/10 chest pain with radiation to her left neck, jaw and abdomen. She felt associated SOB, nausea and lightheadedness with near syncope. She called 911 and took a SL NTG with minimal relief down to 6/10. In the ambulance, they gave her another SL NTG and brought her to [facility] ED for evaluation. She reports CP resolved with only a small pressure still present on arrival. In the ED, BP controlled, HR controlled, CXR clear, labs ok with only elevation Cr 1.09, HS troponin normal at 8.7. Cardiology called for evaluation. On my exam, Pt reports CP is now returning 5/10. Last ischemic evaluation was 3/2020- a nuclear stress test which showed no evidence of ischemia, low risk scan.    Recent PREETHI prior to [facility] showed EF 55 - 60%, mild concentric hypertrophy, severely dilated LA, no thrombus within the left atrial appendage, 20 mm Watchman FLX device implanted well-seated and without evidence of periprosthetic leak on color Doppler, mild mitral valve regurgitation and mild to moderate tricuspid valve regurgitation. Past Medical History:   Diagnosis Date    Arrhythmia     Arthritis     CAD (coronary artery disease)     Cancer ([facility] [address]     Congestive heart failure (HCC)     Essential hypertension     GERD (gastroesophageal reflux disease)     Hyperlipidemia     Morbid obesity (HCC)     Nausea & vomiting     Sleep apnea       Past Surgical History:   Procedure Laterality Date    HX CHOLECYSTECTOMY      HX GASTRIC BYPASS      HX HYSTERECTOMY      HX ORTHOPAEDIC      right rotator cuff    HX ORTHOPAEDIC      bilateral hand surgeries    HX TONSILLECTOMY         Allergies   Allergen Reactions    Latex Rash    Other Food Unknown (comments)     Nuts brazil    Atorvastatin Unknown (comments)    Azo Pms [Black Cohosh-Chaste-Am. Ginseng] Itching    Codeine Itching    Hydrocodone Unknown (comments)    Nickel Rash    Oxycodone Unknown (comments)    Phenazopyridine Unknown (comments)     NOT ALLERGIC    Statins-Hmg-Coa Reductase Inhibitors Myalgia      Social History     Socioeconomic History    Marital status:      Spouse name: Not on file    Number of children: Not on file    Years of education: Not on file    Highest education level: Not on file   Occupational History    Not on file   Tobacco Use    Smoking status: Never Smoker    Smokeless tobacco: Never Used   Substance and Sexual Activity    Alcohol use: No    Drug use: Not on file    Sexual activity: Not on file   Other Topics Concern    Not on file   Social History Narrative    Not on file     Social Determinants of Health     Financial Resource Strain:     Difficulty of Paying Living Expenses:    Food Insecurity:     Worried About Running Out of Food in the Last Year:     Ran Out of Food in the Last Year:    Transportation Needs:     Lack of Transportation (Medical):      Lack of Transportation (Non-Medical):    Physical Activity:     Days of Exercise per Week:     Minutes of Exercise per Session:    Stress:     Feeling of Stress :    Social Connections:     Frequency of Communication with Friends and Family:     Frequency of Social Gatherings with Friends and Family:     Attends Sikhism Services:     Active Member of Clubs or Organizations:     Attends Club or Organization Meetings:     Marital Status:    Intimate Partner Violence:     Fear of Current or Ex-Partner:     Emotionally Abused:     Physically Abused:     Sexually Abused:      Family History   Problem Relation Age of Onset    Coronary Artery Disease Father     Heart Attack Father     Stroke Father     Sudden Death Father         Current Facility-Administered Medications   Medication Dose Route Frequency    sodium chloride (NS) flush 5-10 mL  5-10 mL IntraVENous Q8H    sodium chloride (NS) flush 5-10 mL  5-10 mL IntraVENous PRN     Current Outpatient Medications   Medication Sig    aspirin delayed-release 81 mg tablet Take 1 Tablet by mouth daily.  nabumetone (RELAFEN) 750 mg tablet Take 750 mg by mouth two (2) times a day. As needed    losartan (COZAAR) 25 mg tablet TAKE ONE TABLET BY MOUTH ONCE DAILY.  apixaban (ELIQUIS) 5 mg tablet Take 1 Tablet by mouth two (2) times a day.  metoprolol succinate (TOPROL-XL) 25 mg XL tablet Take 1 Tablet by mouth daily. 1/2 qd    furosemide (Lasix) 40 mg tablet Take 1 Tablet by mouth daily.  potassium chloride SR (KLOR-CON 10) 10 mEq tablet Take 1 Tablet by mouth daily.  spironolactone (ALDACTONE) 25 mg tablet Take 1 Tablet by mouth daily.  nitroglycerin (NITROSTAT) 0.4 mg SL tablet 1 Tablet by SubLINGual route every five (5) minutes as needed for Chest Pain.  polyethylene glycol (Miralax) 17 gram/dose powder Take 17 g by mouth daily.  multivitamin (ONE A DAY) tablet Take 1 Tab by mouth daily.  estradiol (ESTRACE) 2 mg tablet Take 1 mg by mouth daily.  1/2 tablet daily    fluticasone (FLONASE) 50 mcg/actuation nasal spray 2 Sprays by Both Nostrils route daily.  montelukast (SINGULAIR) 10 mg tablet Take 10 mg by mouth daily.  omeprazole (PRILOSEC) 20 mg capsule Take 20 mg by mouth every other day. Review of symptoms:  General: no recent weight loss/gain, +weakness/fatigue, denies fever or chills   Skin: no rashes, lumps, or other skin changes   HEENT: no headache, +dizziness, +lightheadedness, vision changes, hearing changes, tinnitus, vertigo, sinus pressure/pain, bleeding gums, sore throat, or hoarseness   Neck: no swollen glands, goiter, pain or stiffness   Respiratory: no cough, sputum, hemoptysis, +dyspnea, wheezing   Cardiovascular: +chest pain or discomfort, no palpitations, +dyspnea, orthopnea, paroxysmal nocturnal dyspnea, peripheral edema   Gastrointestinal: no trouble swallowing, heartburn, change of appetite, +nausea, change in bowel habits, pain with defecation, rectal bleeding or black/tarry stools, hemorrhoids, constipation, diarrhea, abdominal pain, jaundice, liver or gallbladder problems   Urinary: no frequency, urgency , hematuria, burning/pain with urination, recent flank pain, polyuria, nocturia, or difficulty urinating   Genital: no vaginal or pelvic infections   Peripheral Vascular: no claudication, leg cramps, prior DVTs, swelling of calves, legs, or feet, color change, or swelling with redness or tenderness   Musculoskeletal: no muscle or joint pain/stiffness, joint swelling, erythema of joints, or back pain   Psychiatric: no depression, mental disorders, or excessive stress   Neurological: no history of CVA, +dizziness, no sensory or motor loss, seizures, + near syncope, tremors, numbness, tingling, no changes in mood, attention, or speech, no changes in orientation, memory, insight, or judgment. no headache, vertigo.    Hematologic: no anemia, easy bruising or bleeding   Endocrine: no diabetes, thyroid problems, heat or cold intolerance, excessive sweating, polyuria, polydipsia      Subjective:   Physical Exam  Visit Vitals  /75   Pulse 72   Temp 97.7 °F (36.5 °C)   Resp 20   Ht 5' 3\" (1.6 m)   Wt 97.5 kg (215 lb)   SpO2 98%   BMI 38.09 kg/m²     General Appearance:  Well developed, well nourished, alert and oriented x 3, and individual in no acute distress. Ears/Nose/Mouth/Throat:   Hearing grossly normal.         Neck: Supple. Chest:   Lungs clear to auscultation bilaterally. Cardiovascular:  Regular rate and rhythm, S1, S2    Abdomen:   Soft, non-tender, bowel sounds are active. Extremities: No edema bilaterally. Skin: Warm and dry.            Labs:   Recent Results (from the past 24 hour(s))   EKG, 12 LEAD, INITIAL    Collection Time: 10/25/21 11:12 AM   Result Value Ref Range    Ventricular Rate 64 BPM    Atrial Rate 67 BPM    QRS Duration 108 ms    Q-T Interval 416 ms    QTC Calculation (Bezet) 429 ms    Calculated R Axis -29 degrees    Calculated T Axis 51 degrees    Diagnosis       Atrial fibrillation  Incomplete left bundle branch block  Minimal voltage criteria for LVH, may be normal variant  Abnormal ECG  When compared with ECG of 19-OCT-2021 08:06,  T wave inversion no longer evident in Inferior leads  Nonspecific T wave abnormality, improved in Anterolateral leads     TROPONIN-HIGH SENSITIVITY    Collection Time: 10/25/21 11:19 AM   Result Value Ref Range    Troponin-High Sensitivity 8.7 0 - 14 pg/mL   CBC WITH AUTOMATED DIFF    Collection Time: 10/25/21 11:19 AM   Result Value Ref Range    WBC 7.7 4.3 - 11.1 K/uL    RBC 4.15 4.05 - 5.2 M/uL    HGB 11.8 11.7 - 15.4 g/dL    HCT 38.1 35.8 - 46.3 %    MCV 91.8 79.6 - 97.8 FL    MCH 28.4 26.1 - 32.9 PG    MCHC 31.0 (L) 31.4 - 35.0 g/dL    RDW 13.7 11.9 - 14.6 %    PLATELET 527 914 - 829 K/uL    MPV 9.6 9.4 - 12.3 FL    ABSOLUTE NRBC 0.00 0.0 - 0.2 K/uL    DF AUTOMATED      NEUTROPHILS 63 43 - 78 %    LYMPHOCYTES 24 13 - 44 %    MONOCYTES 9 4.0 - 12.0 %    EOSINOPHILS 3 0.5 - 7.8 %    BASOPHILS 1 0.0 - 2.0 %    IMMATURE GRANULOCYTES 1 0.0 - 5.0 %    ABS. NEUTROPHILS 4.8 1.7 - 8.2 K/UL    ABS. LYMPHOCYTES 1.8 0.5 - 4.6 K/UL    ABS. MONOCYTES 0.7 0.1 - 1.3 K/UL    ABS. EOSINOPHILS 0.2 0.0 - 0.8 K/UL    ABS. BASOPHILS 0.1 0.0 - 0.2 K/UL    ABS. IMM. GRANS. 0.1 0.0 - 0.5 K/UL   METABOLIC PANEL, COMPREHENSIVE    Collection Time: 10/25/21 11:19 AM   Result Value Ref Range    Sodium 137 136 - 145 mmol/L    Potassium 4.1 3.5 - 5.1 mmol/L    Chloride 103 98 - 107 mmol/L    CO2 26 21 - 32 mmol/L    Anion gap 8 7 - 16 mmol/L    Glucose 97 65 - 100 mg/dL    BUN 15 8 - 23 MG/DL    Creatinine 1.09 (H) 0.6 - 1.0 MG/DL    GFR est AA >60 >60 ml/min/1.73m2    GFR est non-AA 52 (L) >60 ml/min/1.73m2    Calcium 9.4 8.3 - 10.4 MG/DL    Bilirubin, total 0.5 0.2 - 1.1 MG/DL    ALT (SGPT) 17 12 - 65 U/L    AST (SGOT) 15 15 - 37 U/L    Alk.  phosphatase 82 50 - 136 U/L    Protein, total 7.2 6.3 - 8.2 g/dL    Albumin 3.1 (L) 3.2 - 4.6 g/dL    Globulin 4.1 (H) 2.3 - 3.5 g/dL    A-G Ratio 0.8 (L) 1.2 - 3.5     LIPASE    Collection Time: 10/25/21 11:19 AM   Result Value Ref Range    Lipase 80 73 - 393 U/L   MAGNESIUM    Collection Time: 10/25/21 11:19 AM   Result Value Ref Range    Magnesium 2.4 1.8 - 2.4 mg/dL       Pt has been seen and examined by Dr. Arias Becerra and he agrees with the following assessment and plan:     Assessment/Plan:        Diagnosis    Chest pain/SOB- admit to telemetry, r/o MI with serial CE's, continue ASA, Toprol, ARB, intolerant to statins, check echo, hold Eliquis with plan C in AM- NPO at MN, sed rate and CRP    Dizziness/lightheadedness and near syncope- orthostatic BPs, monitor on telemetry, check echo    Permanent atrial fibrillation s/p Watchman on 10/19/21- on ASA, Eliquis, rate controlled on Toprol XL    Pulmonary HTN ([address]    H/o NICM (nonischemic cardiomyopathy) however EF improved and normalized on recent echo- on Toprol XL, ARB and Lasix    Hypertension- controlled, monitor    Minimal CAD (coronary artery disease) by cath 2016- normal nuclear stress test 3/2020    Hypercholesteremia- intolerant to statins    Severe obesity (HCC)    Esophageal reflux- PPI         KAVON FigueroaC

## 2021-10-25 NOTE — Clinical Note
Left radial artery. Accessed successfully. Radial access needle used. Using fluoro guidance.  Number of attempts =  1.

## 2021-10-25 NOTE — Clinical Note
Contrast Dose Calculator:   Patient's age: 66.   Patient's sex: Female. Patient weight (kg) = 96.9. Creatinine level (mg/dL) = 1.14. Creatinine clearance (mL/min): 67.   Contrast concentration (mg/mL) = 370. MACD = 300 mL. Max Contrast dose per Creatinine Cl calculator = 150.75 mL.

## 2021-10-25 NOTE — Clinical Note
TRANSFER - OUT REPORT:     Verbal report given to: cpru rn. Report consisted of patient's Situation, Background, Assessment and   Recommendations(SBAR). Opportunity for questions and clarification was provided. Patient transported with a Registered Nurse. Patient transported to: recovery.

## 2021-10-25 NOTE — Clinical Note
Follow up with PMD or Walkin PRN or within 3 days if not improving.     Mallampati: Class II - soft palate, uvula, fauces visible. ASA: Class 2 - patient with mild systemic disease.

## 2021-10-25 NOTE — ED TRIAGE NOTES
Patient arrives to ED via EMS. Patient complains of chest pain. Patient was given 2 nitro and 324 mg of Aspirin in route. Denies SOB. Denies nausea. Patient has cardiac history.      BP: 110/57  HR: 60-80 Afib  O2: 98% RA

## 2021-10-25 NOTE — ED PROVIDER NOTES
68-year-old female patient with history of A. fib, CHF and recent watchman procedure presents to the emergency department with reports of near syncope, chest pain and shortness of breath. Patient states symptoms started on Saturday at which time she became lightheaded, diaphoretic and nearly lost consciousness per report. She is able to rest and states her pain improved. She states she rested throughout the day on Sunday as well with some ongoing substernal discomfort. Today, upon waking at 830, she noted ongoing of chest pain and again had another near syncopal episode. She describes chest discomfort as a tightness in the center of her chest that radiates to her left arm and jaw. She reports nausea without vomiting. She states symptoms improved slightly with 1 nitro taken at home prior to EMS transport. She received a subsequent nitro in route with significant change in her chest pain. Patient reports minimal chest pressure at this time. She is currently anticoagulated with Eliquis.            Past Medical History:   Diagnosis Date    Arrhythmia     Arthritis     CAD (coronary artery disease)     Cancer ([facility] [address]     Congestive heart failure (HCC)     Essential hypertension     GERD (gastroesophageal reflux disease)     Hyperlipidemia     Morbid obesity (HCC)     Nausea & vomiting     Sleep apnea        Past Surgical History:   Procedure Laterality Date    HX CHOLECYSTECTOMY      HX GASTRIC BYPASS      HX HYSTERECTOMY      HX ORTHOPAEDIC      right rotator cuff    HX ORTHOPAEDIC      bilateral hand surgeries    HX TONSILLECTOMY           Family History:   Problem Relation Age of Onset    Coronary Artery Disease Father     Heart Attack Father     Stroke Father     Sudden Death Father        Social History     Socioeconomic History    Marital status:      Spouse name: Not on file    Number of children: Not on file    Years of education: Not on file    Highest education level: Not on file   Occupational History    Not on file   Tobacco Use    Smoking status: Never Smoker    Smokeless tobacco: Never Used   Substance and Sexual Activity    Alcohol use: No    Drug use: Not on file    Sexual activity: Not on file   Other Topics Concern    Not on file   Social History Narrative    Not on file     Social Determinants of Health     Financial Resource Strain:     Difficulty of Paying Living Expenses:    Food Insecurity:     Worried About Running Out of Food in the Last Year:     [address] in the Last Year:    Transportation Needs:     Lack of Transportation (Medical):  Lack of Transportation (Non-Medical):    Physical Activity:     Days of Exercise per Week:     Minutes of Exercise per Session:    Stress:     Feeling of Stress :    Social Connections:     Frequency of Communication with Friends and Family:     Frequency of Social Gatherings with Friends and Family:     Attends [employer] Services:     Active Member of Clubs or Organizations:     Attends Club or Organization Meetings:     Marital Status:    Intimate Partner Violence:     Fear of Current or Ex-Partner:     Emotionally Abused:     Physically Abused:     Sexually Abused: ALLERGIES: Latex, Other food, Atorvastatin, Azo pms [black [email or web address]], Codeine, Hydrocodone, Nickel, Oxycodone, Phenazopyridine, and Statins-hmg-coa reductase inhibitors    Review of Systems   Constitutional: Positive for diaphoresis. Negative for chills and fever. HENT: Negative for congestion, sneezing and sore throat. Eyes: Negative for visual disturbance. Respiratory: Positive for chest tightness and shortness of breath. Negative for cough and wheezing. Cardiovascular: Positive for chest pain. Negative for leg swelling. Gastrointestinal: Negative for abdominal pain, blood in stool, diarrhea, nausea and vomiting. Endocrine: Negative for polyuria.    Genitourinary: Negative for difficulty urinating, dysuria, flank pain, hematuria and urgency. Musculoskeletal: Negative for back pain, myalgias, neck pain and neck stiffness. Skin: Negative for color change and rash. Neurological: Positive for light-headedness. Negative for dizziness, syncope, speech difficulty, weakness, numbness and headaches. Psychiatric/Behavioral: Negative for behavioral problems. All other systems reviewed and are negative. Vitals:    10/25/21 1112   BP: 115/75   Pulse: 72   Resp: 20   Temp: 97.7 °F (36.5 °C)   SpO2: 98%   Weight: 97.5 kg (215 lb)   Height: 5' 3\" (1.6 m)            Physical Exam  Vitals and nursing note reviewed. Constitutional:       General: She is not in acute distress. Appearance: She is well-developed. She is not diaphoretic. Comments: Alert and oriented to person place and time. No acute distress, speaks in clear, fluid sentences. HENT:      Head: Normocephalic and atraumatic. Right Ear: External ear normal.      Left Ear: External ear normal.      Nose: Nose normal.   Eyes:      Pupils: Pupils are equal, round, and reactive to light. Cardiovascular:      Rate and Rhythm: Normal rate and regular rhythm. Heart sounds: Normal heart sounds. No murmur heard. No friction rub. No gallop. Pulmonary:      Effort: Pulmonary effort is normal. No respiratory distress. Breath sounds: Normal breath sounds. No stridor. No decreased breath sounds, wheezing, rhonchi or rales. Chest:      Chest wall: No tenderness. Abdominal:      General: There is no distension. Palpations: Abdomen is soft. There is no mass. Tenderness: There is no abdominal tenderness. There is no guarding or rebound. Hernia: No hernia is present. Musculoskeletal:         General: No tenderness or deformity. Normal range of motion. Cervical back: Normal range of motion. Skin:     General: Skin is warm and dry.    Neurological:      Mental Status: She is alert and oriented to person, place, and time. Cranial Nerves: No cranial nerve deficit. MDM  Number of Diagnoses or Management Options  Diagnosis management comments: Bedside ultrasound was obtained with the use of the parasternal cardiac fields. No obvious evidence of significant effusion or abnormal motility. Joanie patient case with on-call cardiologist who requested stat echocardiogram to be ordered. This orders been placed and tech notified. Discussed labs, x-ray and EKG results with patient and plans for echocardiogram and evaluation by cardiology. Voice dictation software was used during the making of this note. This software is not perfect and grammatical and other typographical errors may be present. This note has been proofread, but may still contain errors. [address] Doctor Rodney Manrique [facility]; 10/25/2021 @2:46 PM   ===================================================================         Amount and/or Complexity of Data Reviewed  Clinical lab tests: ordered and reviewed  Tests in the radiology section of CPT®: ordered and reviewed  Tests in the medicine section of CPT®: ordered and reviewed  Discuss the patient with other providers: yes  Independent visualization of images, tracings, or specimens: yes    Risk of Complications, Morbidity, and/or Mortality  Presenting problems: moderate  Diagnostic procedures: low  Management options: moderate    Patient Progress  Patient progress: stable    ED Course as of Oct 25 1341   Mon Oct 25, 2021   1339 EKG interpretation: Atrial fibrillation, left axis deviation, no ischemia. Rate of 64. [BR]      ED Course User Index  [BR] Amy Leyva DO       Bedside US    Date/Time: 10/25/2021 2:43 PM  Performed by: Amy Leyva DO  Authorized by: Amy Leyva DO     Verbal consent obtained: Yes    Given by:  Patient  Performed by: Attending  Type of procedure: Focused cardiac (Echo)  Left leg:      Indications:  Chest pain    Parasternal long axis:  Adequate Parasternal short axis:  Adequate    Pericardial effusion:  Absent    Global Ventricular Function:  Normal    Interpretation:  No sonographic evidence of significant pericardial effusion and No sonographic evidence of significant cardiac dysfunction    Confirmation study:  A confirmatory study was obtained while the patient was in the Emergency Department.

## 2021-10-25 NOTE — ED TRIAGE NOTES
Pt BIB EMS from home, reports substernal chest pain that radiated to her jaw with non productive cough. States generalized weakness with nausea. Denies vomiting, diarrhea, fever, chills. Given ASA and nitro with relief of pain, states she feels it's coming back at this time. Also reports that she had a watchman device placed last week.

## 2021-10-26 ENCOUNTER — APPOINTMENT (OUTPATIENT)
Dept: CARDIAC CATH/INVASIVE PROCEDURES | Age: 71
DRG: 392 | End: 2021-10-26
Attending: INTERNAL MEDICINE
Payer: MEDICARE

## 2021-10-26 ENCOUNTER — HOSPITAL ENCOUNTER (OUTPATIENT)
Dept: CARDIAC CATH/INVASIVE PROCEDURES | Age: 71
Discharge: HOME | DRG: 392 | End: 2021-10-26
Payer: MEDICARE

## 2021-10-26 LAB
ANION GAP SERPL CALC-SCNC: 6 MMOL/L (ref 7–16)
ATRIAL RATE: 67 BPM
B PERT DNA SPEC QL NAA+PROBE: NOT DETECTED
BORDETELLA PARAPERTUSSIS PCR, BORPAR: NOT DETECTED
BUN SERPL-MCNC: 18 MG/DL (ref 8–23)
C PNEUM DNA SPEC QL NAA+PROBE: NOT DETECTED
CALCIUM SERPL-MCNC: 9.7 MG/DL (ref 8.3–10.4)
CALCULATED R AXIS, ECG10: -29 DEGREES
CALCULATED T AXIS, ECG11: 51 DEGREES
CHLORIDE SERPL-SCNC: 102 MMOL/L (ref 98–107)
CHOLEST SERPL-MCNC: 240 MG/DL
CO2 SERPL-SCNC: 29 MMOL/L (ref 21–32)
CREAT SERPL-MCNC: 1.14 MG/DL (ref 0.6–1)
CRP SERPL HS-MCNC: 1.7 MG/L
DIAGNOSIS, 93000: NORMAL
ERYTHROCYTE [DISTWIDTH] IN BLOOD BY AUTOMATED COUNT: 13.6 % (ref 11.9–14.6)
FLUAV SUBTYP SPEC NAA+PROBE: NOT DETECTED
FLUBV RNA SPEC QL NAA+PROBE: NOT DETECTED
GLUCOSE BLD STRIP.AUTO-MCNC: 122 MG/DL (ref 65–100)
GLUCOSE SERPL-MCNC: 95 MG/DL (ref 65–100)
HADV DNA SPEC QL NAA+PROBE: NOT DETECTED
HCOV 229E RNA SPEC QL NAA+PROBE: NOT DETECTED
HCOV HKU1 RNA SPEC QL NAA+PROBE: NOT DETECTED
HCOV NL63 RNA SPEC QL NAA+PROBE: NOT DETECTED
HCOV OC43 RNA SPEC QL NAA+PROBE: NOT DETECTED
HCT VFR BLD AUTO: 35.7 % (ref 35.8–46.3)
HDLC SERPL-MCNC: 63 MG/DL (ref 40–60)
HDLC SERPL: 3.8 {RATIO}
HGB BLD-MCNC: 11.1 G/DL (ref 11.7–15.4)
HMPV RNA SPEC QL NAA+PROBE: NOT DETECTED
HPIV1 RNA SPEC QL NAA+PROBE: NOT DETECTED
HPIV2 RNA SPEC QL NAA+PROBE: NOT DETECTED
HPIV3 RNA SPEC QL NAA+PROBE: NOT DETECTED
HPIV4 RNA SPEC QL NAA+PROBE: NOT DETECTED
LDLC SERPL CALC-MCNC: 155.4 MG/DL
M PNEUMO DNA SPEC QL NAA+PROBE: NOT DETECTED
MCH RBC QN AUTO: 27.8 PG (ref 26.1–32.9)
MCHC RBC AUTO-ENTMCNC: 31.1 G/DL (ref 31.4–35)
MCV RBC AUTO: 89.3 FL (ref 79.6–97.8)
NRBC # BLD: 0 K/UL (ref 0–0.2)
PLATELET # BLD AUTO: 253 K/UL (ref 150–450)
PMV BLD AUTO: 9.3 FL (ref 9.4–12.3)
POTASSIUM SERPL-SCNC: 4 MMOL/L (ref 3.5–5.1)
Q-T INTERVAL, ECG07: 416 MS
QRS DURATION, ECG06: 108 MS
QTC CALCULATION (BEZET), ECG08: 429 MS
RBC # BLD AUTO: 4 M/UL (ref 4.05–5.2)
RSV RNA SPEC QL NAA+PROBE: NOT DETECTED
RV+EV RNA SPEC QL NAA+PROBE: NOT DETECTED
SARS-COV-2 PCR, COVPCR: NOT DETECTED
SERVICE CMNT-IMP: ABNORMAL
SODIUM SERPL-SCNC: 137 MMOL/L (ref 136–145)
TRIGL SERPL-MCNC: 108 MG/DL (ref 35–150)
TROPONIN-HIGH SENSITIVITY: 9.4 PG/ML (ref 0–14)
TROPONIN-HIGH SENSITIVITY: 9.7 PG/ML (ref 0–14)
VENTRICULAR RATE, ECG03: 64 BPM
VLDLC SERPL CALC-MCNC: 21.6 MG/DL (ref 6–23)
WBC # BLD AUTO: 7.2 K/UL (ref 4.3–11.1)

## 2021-10-26 PROCEDURE — 77030016699 HC CATH ANGI DX INFN1 CARD -A: Performed by: INTERNAL MEDICINE

## 2021-10-26 PROCEDURE — 93458 L HRT ARTERY/VENTRICLE ANGIO: CPT | Performed by: INTERNAL MEDICINE

## 2021-10-26 PROCEDURE — 80061 LIPID PANEL: CPT

## 2021-10-26 PROCEDURE — 93325 DOPPLER ECHO COLOR FLOW MAPG: CPT

## 2021-10-26 PROCEDURE — 99152 MOD SED SAME PHYS/QHP 5/>YRS: CPT

## 2021-10-26 PROCEDURE — 77030029997 HC DEV COM RDL R BND TELE -B: Performed by: INTERNAL MEDICINE

## 2021-10-26 PROCEDURE — 99152 MOD SED SAME PHYS/QHP 5/>YRS: CPT | Performed by: INTERNAL MEDICINE

## 2021-10-26 PROCEDURE — 74011250637 HC RX REV CODE- 250/637: Performed by: PHYSICIAN ASSISTANT

## 2021-10-26 PROCEDURE — 80048 BASIC METABOLIC PNL TOTAL CA: CPT

## 2021-10-26 PROCEDURE — B2111ZZ FLUOROSCOPY OF MULTIPLE CORONARY ARTERIES USING LOW OSMOLAR CONTRAST: ICD-10-PCS | Performed by: INTERNAL MEDICINE

## 2021-10-26 PROCEDURE — 82962 GLUCOSE BLOOD TEST: CPT

## 2021-10-26 PROCEDURE — 36415 COLL VENOUS BLD VENIPUNCTURE: CPT

## 2021-10-26 PROCEDURE — B24BZZ4 ULTRASONOGRAPHY OF HEART WITH AORTA, TRANSESOPHAGEAL: ICD-10-PCS | Performed by: INTERNAL MEDICINE

## 2021-10-26 PROCEDURE — 4A023N7 MEASUREMENT OF CARDIAC SAMPLING AND PRESSURE, LEFT HEART, PERCUTANEOUS APPROACH: ICD-10-PCS | Performed by: INTERNAL MEDICINE

## 2021-10-26 PROCEDURE — 99218 HC RM OBSERVATION: CPT

## 2021-10-26 PROCEDURE — 84484 ASSAY OF TROPONIN QUANT: CPT

## 2021-10-26 PROCEDURE — 93312 ECHO TRANSESOPHAGEAL: CPT | Performed by: INTERNAL MEDICINE

## 2021-10-26 PROCEDURE — 74011000250 HC RX REV CODE- 250: Performed by: INTERNAL MEDICINE

## 2021-10-26 PROCEDURE — C1769 GUIDE WIRE: HCPCS | Performed by: INTERNAL MEDICINE

## 2021-10-26 PROCEDURE — 99153 MOD SED SAME PHYS/QHP EA: CPT | Performed by: INTERNAL MEDICINE

## 2021-10-26 PROCEDURE — 93325 DOPPLER ECHO COLOR FLOW MAPG: CPT | Performed by: INTERNAL MEDICINE

## 2021-10-26 PROCEDURE — 74011250637 HC RX REV CODE- 250/637: Performed by: NURSE PRACTITIONER

## 2021-10-26 PROCEDURE — 93320 DOPPLER ECHO COMPLETE: CPT | Performed by: INTERNAL MEDICINE

## 2021-10-26 PROCEDURE — 85027 COMPLETE CBC AUTOMATED: CPT

## 2021-10-26 PROCEDURE — 74011250636 HC RX REV CODE- 250/636: Performed by: INTERNAL MEDICINE

## 2021-10-26 PROCEDURE — 86141 C-REACTIVE PROTEIN HS: CPT

## 2021-10-26 PROCEDURE — C1894 INTRO/SHEATH, NON-LASER: HCPCS | Performed by: INTERNAL MEDICINE

## 2021-10-26 PROCEDURE — 2709999900 HC NON-CHARGEABLE SUPPLY

## 2021-10-26 PROCEDURE — 74011000636 HC RX REV CODE- 636: Performed by: INTERNAL MEDICINE

## 2021-10-26 PROCEDURE — 74011250637 HC RX REV CODE- 250/637: Performed by: INTERNAL MEDICINE

## 2021-10-26 PROCEDURE — 0202U NFCT DS 22 TRGT SARS-COV-2: CPT

## 2021-10-26 RX ORDER — COLCHICINE 0.6 MG/1
0.6 TABLET ORAL DAILY
Status: DISCONTINUED | OUTPATIENT
Start: 2021-10-26 | End: 2021-10-29 | Stop reason: HOSPADM

## 2021-10-26 RX ORDER — FENTANYL CITRATE 50 UG/ML
INJECTION, SOLUTION INTRAMUSCULAR; INTRAVENOUS AS NEEDED
Status: DISCONTINUED | OUTPATIENT
Start: 2021-10-26 | End: 2021-10-26 | Stop reason: HOSPADM

## 2021-10-26 RX ORDER — ACETAMINOPHEN 325 MG/1
650 TABLET ORAL
Status: DISCONTINUED | OUTPATIENT
Start: 2021-10-26 | End: 2021-10-29 | Stop reason: HOSPADM

## 2021-10-26 RX ORDER — MIDAZOLAM HYDROCHLORIDE 1 MG/ML
INJECTION, SOLUTION INTRAMUSCULAR; INTRAVENOUS AS NEEDED
Status: DISCONTINUED | OUTPATIENT
Start: 2021-10-26 | End: 2021-10-26 | Stop reason: HOSPADM

## 2021-10-26 RX ORDER — HEPARIN SODIUM 200 [USP'U]/100ML
INJECTION, SOLUTION INTRAVENOUS
Status: COMPLETED | OUTPATIENT
Start: 2021-10-26 | End: 2021-10-26

## 2021-10-26 RX ORDER — MIDAZOLAM HYDROCHLORIDE 1 MG/ML
.5-2 INJECTION, SOLUTION INTRAMUSCULAR; INTRAVENOUS
Status: DISCONTINUED | OUTPATIENT
Start: 2021-10-26 | End: 2021-10-29 | Stop reason: HOSPADM

## 2021-10-26 RX ORDER — LIDOCAINE HYDROCHLORIDE 20 MG/ML
15 SOLUTION OROPHARYNGEAL AS NEEDED
Status: DISCONTINUED | OUTPATIENT
Start: 2021-10-26 | End: 2021-10-29 | Stop reason: HOSPADM

## 2021-10-26 RX ORDER — SODIUM CHLORIDE 9 MG/ML
75 INJECTION, SOLUTION INTRAVENOUS CONTINUOUS
Status: DISCONTINUED | OUTPATIENT
Start: 2021-10-26 | End: 2021-10-29

## 2021-10-26 RX ORDER — LIDOCAINE HYDROCHLORIDE 10 MG/ML
INJECTION INFILTRATION; PERINEURAL AS NEEDED
Status: DISCONTINUED | OUTPATIENT
Start: 2021-10-26 | End: 2021-10-26 | Stop reason: HOSPADM

## 2021-10-26 RX ORDER — FENTANYL CITRATE 50 UG/ML
25-50 INJECTION, SOLUTION INTRAMUSCULAR; INTRAVENOUS
Status: DISCONTINUED | OUTPATIENT
Start: 2021-10-26 | End: 2021-10-29 | Stop reason: HOSPADM

## 2021-10-26 RX ADMIN — COLCHICINE 0.6 MG: 0.6 TABLET, FILM COATED ORAL at 12:37

## 2021-10-26 RX ADMIN — NITROGLYCERIN 1 INCH: 20 OINTMENT TOPICAL at 00:56

## 2021-10-26 RX ADMIN — Medication 10 ML: at 23:02

## 2021-10-26 RX ADMIN — LIDOCAINE HYDROCHLORIDE 15 ML: 20 SOLUTION ORAL; TOPICAL at 10:32

## 2021-10-26 RX ADMIN — ESTRADIOL 1 MG: 1 TABLET ORAL at 08:16

## 2021-10-26 RX ADMIN — ASPIRIN 81 MG: 81 TABLET ORAL at 08:16

## 2021-10-26 RX ADMIN — MIDAZOLAM 2 MG: 1 INJECTION INTRAMUSCULAR; INTRAVENOUS at 10:38

## 2021-10-26 RX ADMIN — FENTANYL CITRATE 25 MCG: 50 INJECTION INTRAMUSCULAR; INTRAVENOUS at 10:42

## 2021-10-26 RX ADMIN — MIDAZOLAM 2 MG: 1 INJECTION INTRAMUSCULAR; INTRAVENOUS at 10:36

## 2021-10-26 RX ADMIN — POLYETHYLENE GLYCOL 3350 8.5 G: 17 POWDER, FOR SOLUTION ORAL at 23:01

## 2021-10-26 RX ADMIN — SPIRONOLACTONE 25 MG: 25 TABLET ORAL at 08:16

## 2021-10-26 RX ADMIN — PANTOPRAZOLE SODIUM 40 MG: 40 TABLET, DELAYED RELEASE ORAL at 06:25

## 2021-10-26 RX ADMIN — METOPROLOL SUCCINATE 25 MG: 25 TABLET, EXTENDED RELEASE ORAL at 08:16

## 2021-10-26 RX ADMIN — LOSARTAN POTASSIUM 25 MG: 25 TABLET, FILM COATED ORAL at 08:16

## 2021-10-26 RX ADMIN — MONTELUKAST 10 MG: 10 TABLET, FILM COATED ORAL at 08:16

## 2021-10-26 RX ADMIN — Medication 10 ML: at 00:08

## 2021-10-26 RX ADMIN — SODIUM CHLORIDE 75 ML/HR: 900 INJECTION, SOLUTION INTRAVENOUS at 07:46

## 2021-10-26 RX ADMIN — Medication 10 ML: at 06:25

## 2021-10-26 RX ADMIN — Medication 10 ML: at 23:05

## 2021-10-26 RX ADMIN — ACETAMINOPHEN 650 MG: 325 TABLET ORAL at 00:56

## 2021-10-26 RX ADMIN — MIDAZOLAM 1 MG: 1 INJECTION INTRAMUSCULAR; INTRAVENOUS at 10:42

## 2021-10-26 RX ADMIN — FENTANYL CITRATE 50 MCG: 50 INJECTION INTRAMUSCULAR; INTRAVENOUS at 10:36

## 2021-10-26 NOTE — PROGRESS NOTES
Pt admitted for chest pain. Pt is s/p Watchman 10/19. Pt underwent LHC/PREETHI 10/26. POC- Continue medical therapy. Care Management Interventions  PCP Verified by CM: Yes Loi Alvarez)  Last Visit to PCP: 08/11/21  Mode of Transport at Discharge: Other (see comment) (Irina,Chapo Spouse [phone] )  Support Systems: Spouse/Significant Other  Discharge Location  Discharge Placement: Home  Chart screened by  for discharge planning. No needs identified at this time. Please consult  if any new issues arise.

## 2021-10-26 NOTE — PROGRESS NOTES
Pt skin is dry and intact. Sacrum is dry and intact with no redness. Pt heels are dry and intact with no redness. Pt has bruising on the Right forearm and Right groin from watchman.

## 2021-10-26 NOTE — PROCEDURES
Brief Cardiac Procedure Note    Patient: Jon Lima MRN: [id number]  SSN: [id number]    YOB: 1950  Age: 66 y.o. Sex: female      Date of Procedure: 10/26/2021     Pre-procedure Diagnosis: Chest pain. Post-procedure Diagnosis: No etiology of pain seen. Procedure: Transesophageal Echocardiogram. [facility]    Brief Description of Procedure: As above    Performed By: Blayne Gao MD     Assistants: None    Anesthesia: Moderate Sedation    Estimated Blood Loss: Less than 10 mL      Specimens: None    Implants: None    Findings: Mild CAD. Normal LV function. Watchman in good position. No effusion seen. Complications: None    Recommendations: Continue medical therapy.     Signed By: Blayne Gao MD     October 26, 2021

## 2021-10-26 NOTE — ROUTINE PROCESS
Radial compression band removed at 1540 after slowly reducing air from 12 cc to zero as per hospital protocol. No bleeding or hematoma noted. 2 x 2 gauze with tegaderm placed over puncture site. The affected extremity is warm and dry to the touch. Frequent vital signs printed and placed on bedside chart. Patient instructed to call if any bleeding noted on gauze. Patient verbalized understanding the nursing instructions.

## 2021-10-26 NOTE — PROGRESS NOTES
Bedside shift change report given to Robert Coombs (oncoming nurse) by self (offgoing nurse). Report included the following information SBAR, Kardex, Procedure Summary, Intake/Output, MAR and Cardiac Rhythm A fib.

## 2021-10-26 NOTE — PROGRESS NOTES
TRANSFER - IN REPORT:    Verbal report received from ER RN(name) on Torey Pelaez  being received from ER(unit) for routine progression of care      Report consisted of patients Situation, Background, Assessment and   Recommendations(SBAR). Information from the following report(s) SBAR, Kardex, Procedure Summary, Intake/Output, MAR and Cardiac Rhythm A fib was reviewed with the receiving nurse. Opportunity for questions and clarification was provided. Assessment completed upon patients arrival to unit and care assumed.

## 2021-10-26 NOTE — PROGRESS NOTES
TRANSFER - IN REPORT:    Verbal report received from RN(name) on Negar Slipper  being received from 3rd floor(unit) for ordered procedure      Report consisted of patients Situation, Background, Assessment and   Recommendations(SBAR). Information from the following report(s) SBAR was reviewed with the receiving nurse. Opportunity for questions and clarification was provided. Assessment completed upon patients arrival to unit and care assumed.

## 2021-10-26 NOTE — PROGRESS NOTES
Problem: Falls - Risk of  Goal: *Absence of Falls  Description: Document Lux Pinon Fall Risk and appropriate interventions in the flowsheet. Outcome: Progressing Towards Goal  Note: Fall Risk Interventions:            Medication Interventions: Teach patient to arise slowly         History of Falls Interventions:  Investigate reason for fall         Problem: Patient Education: Go to Patient Education Activity  Goal: Patient/Family Education  Outcome: Progressing Towards Goal

## 2021-10-26 NOTE — ROUTINE PROCESS
Bedside and Verbal report given to self by Reanna Valencia RN. Report included SBAR, Kardex, ED Summary, Procedure Summary, Intake and Output and Cardiac Rhythm.

## 2021-10-26 NOTE — ED NOTES
TRANSFER - OUT REPORT:    Verbal report given to 325 (name) on Delilah Mckeon  being transferred to Anca, RN (unit) for routine progression of care       Report consisted of patients Situation, Background, Assessment and   Recommendations(SBAR). Information from the following report(s) SBAR was reviewed with the receiving nurse. Lines:       Opportunity for questions and clarification was provided.       Patient transported with:   Registered Nurse

## 2021-10-26 NOTE — PROGRESS NOTES
TRANSFER - OUT REPORT:    Verbal report given to RN(name) on Eber Menjivar  being transferred to [facility](unit) for routine progression of care       Report consisted of patients Situation, Background, Assessment and   Recommendations(SBAR). Information from the following report(s) SBAR was reviewed with the receiving nurse.     PREETHI and LHC w/ [place]  No interventions on cath  L radial - TR band 12 MLS  6 mg Versed  100 mcg Fentanyl  5000 units heparin in radial cocktail

## 2021-10-26 NOTE — ROUTINE PROCESS
Bedside and Verbal report given to Della Link RN by self. Report included SBAR, Kardex, ED summary, procedure summary, recent results and cardiac rhythm.

## 2021-10-26 NOTE — ROUTINE PROCESS
Bedside and Verbal shift change report given to self (oncoming nurse) by Ari Deshpande RN (offgoing nurse). Report included the following information SBAR, Kardex, Procedure Summary, Intake/Output, MAR and Recent Results.

## 2021-10-27 ENCOUNTER — APPOINTMENT (OUTPATIENT)
Dept: CT IMAGING | Age: 71
DRG: 392 | End: 2021-10-27
Attending: INTERNAL MEDICINE
Payer: MEDICARE

## 2021-10-27 PROBLEM — K58.9 IBS (IRRITABLE BOWEL SYNDROME): Status: ACTIVE | Noted: 2021-10-27

## 2021-10-27 PROBLEM — N30.90 CYSTITIS: Status: ACTIVE | Noted: 2021-10-27

## 2021-10-27 LAB
ANION GAP SERPL CALC-SCNC: 6 MMOL/L (ref 7–16)
APPEARANCE UR: ABNORMAL
BACTERIA URNS QL MICRO: ABNORMAL /HPF
BILIRUB UR QL: NEGATIVE
BUN SERPL-MCNC: 15 MG/DL (ref 8–23)
CALCIUM SERPL-MCNC: 8.1 MG/DL (ref 8.3–10.4)
CHLORIDE SERPL-SCNC: 103 MMOL/L (ref 98–107)
CO2 SERPL-SCNC: 26 MMOL/L (ref 21–32)
COLOR UR: YELLOW
CREAT SERPL-MCNC: 1 MG/DL (ref 0.6–1)
EPI CELLS #/AREA URNS HPF: ABNORMAL /HPF
GLUCOSE SERPL-MCNC: 93 MG/DL (ref 65–100)
GLUCOSE UR STRIP.AUTO-MCNC: NEGATIVE MG/DL
HGB UR QL STRIP: NEGATIVE
KETONES UR QL STRIP.AUTO: NEGATIVE MG/DL
LEUKOCYTE ESTERASE UR QL STRIP.AUTO: ABNORMAL
NITRITE UR QL STRIP.AUTO: POSITIVE
OTHER OBSERVATIONS,UCOM: ABNORMAL
PH UR STRIP: 5.5 [PH] (ref 5–9)
POTASSIUM SERPL-SCNC: 4.5 MMOL/L (ref 3.5–5.1)
PROT UR STRIP-MCNC: NEGATIVE MG/DL
RBC #/AREA URNS HPF: ABNORMAL /HPF
SODIUM SERPL-SCNC: 135 MMOL/L (ref 136–145)
SP GR UR REFRACTOMETRY: 1.01 (ref 1–1.02)
UROBILINOGEN UR QL STRIP.AUTO: 1 EU/DL (ref 0.2–1)
WBC URNS QL MICRO: ABNORMAL /HPF

## 2021-10-27 PROCEDURE — 87088 URINE BACTERIA CULTURE: CPT

## 2021-10-27 PROCEDURE — 87186 SC STD MICRODIL/AGAR DIL: CPT

## 2021-10-27 PROCEDURE — 99232 SBSQ HOSP IP/OBS MODERATE 35: CPT | Performed by: INTERNAL MEDICINE

## 2021-10-27 PROCEDURE — 74011250637 HC RX REV CODE- 250/637: Performed by: PHYSICIAN ASSISTANT

## 2021-10-27 PROCEDURE — 87086 URINE CULTURE/COLONY COUNT: CPT

## 2021-10-27 PROCEDURE — 74011250636 HC RX REV CODE- 250/636: Performed by: INTERNAL MEDICINE

## 2021-10-27 PROCEDURE — 74011250637 HC RX REV CODE- 250/637: Performed by: INTERNAL MEDICINE

## 2021-10-27 PROCEDURE — 74011250636 HC RX REV CODE- 250/636: Performed by: PHYSICIAN ASSISTANT

## 2021-10-27 PROCEDURE — 80048 BASIC METABOLIC PNL TOTAL CA: CPT

## 2021-10-27 PROCEDURE — 74011000636 HC RX REV CODE- 636: Performed by: INTERNAL MEDICINE

## 2021-10-27 PROCEDURE — 74011250637 HC RX REV CODE- 250/637: Performed by: NURSE PRACTITIONER

## 2021-10-27 PROCEDURE — 65660000000 HC RM CCU STEPDOWN

## 2021-10-27 PROCEDURE — 74011000258 HC RX REV CODE- 258: Performed by: INTERNAL MEDICINE

## 2021-10-27 PROCEDURE — 74177 CT ABD & PELVIS W/CONTRAST: CPT

## 2021-10-27 PROCEDURE — 99218 HC RM OBSERVATION: CPT

## 2021-10-27 PROCEDURE — 36415 COLL VENOUS BLD VENIPUNCTURE: CPT

## 2021-10-27 PROCEDURE — 81001 URINALYSIS AUTO W/SCOPE: CPT

## 2021-10-27 RX ORDER — SODIUM CHLORIDE 0.9 % (FLUSH) 0.9 %
10 SYRINGE (ML) INJECTION
Status: COMPLETED | OUTPATIENT
Start: 2021-10-27 | End: 2021-10-27

## 2021-10-27 RX ORDER — PANTOPRAZOLE SODIUM 40 MG/1
40 TABLET, DELAYED RELEASE ORAL
Status: DISCONTINUED | OUTPATIENT
Start: 2021-10-27 | End: 2021-10-29

## 2021-10-27 RX ORDER — TRAMADOL HYDROCHLORIDE 50 MG/1
50 TABLET ORAL
Status: DISCONTINUED | OUTPATIENT
Start: 2021-10-27 | End: 2021-10-29 | Stop reason: HOSPADM

## 2021-10-27 RX ORDER — SUCRALFATE 1 G/1
1 TABLET ORAL
Status: DISCONTINUED | OUTPATIENT
Start: 2021-10-27 | End: 2021-10-29 | Stop reason: HOSPADM

## 2021-10-27 RX ADMIN — Medication 10 ML: at 21:22

## 2021-10-27 RX ADMIN — SODIUM CHLORIDE 100 ML: 900 INJECTION, SOLUTION INTRAVENOUS at 15:30

## 2021-10-27 RX ADMIN — PANTOPRAZOLE SODIUM 40 MG: 40 TABLET, DELAYED RELEASE ORAL at 22:01

## 2021-10-27 RX ADMIN — Medication 10 ML: at 21:21

## 2021-10-27 RX ADMIN — SUCRALFATE 1 G: 1 TABLET ORAL at 16:30

## 2021-10-27 RX ADMIN — SPIRONOLACTONE 25 MG: 25 TABLET ORAL at 08:32

## 2021-10-27 RX ADMIN — DIATRIZOATE MEGLUMINE AND DIATRIZOATE SODIUM 15 ML: 660; 100 LIQUID ORAL; RECTAL at 13:47

## 2021-10-27 RX ADMIN — Medication 10 ML: at 15:30

## 2021-10-27 RX ADMIN — CEFTRIAXONE 1 G: 1 INJECTION, POWDER, FOR SOLUTION INTRAMUSCULAR; INTRAVENOUS at 12:51

## 2021-10-27 RX ADMIN — ESTRADIOL 1 MG: 1 TABLET ORAL at 08:31

## 2021-10-27 RX ADMIN — PANTOPRAZOLE SODIUM 40 MG: 40 TABLET, DELAYED RELEASE ORAL at 05:15

## 2021-10-27 RX ADMIN — Medication 10 ML: at 12:57

## 2021-10-27 RX ADMIN — METOPROLOL SUCCINATE 25 MG: 25 TABLET, EXTENDED RELEASE ORAL at 08:32

## 2021-10-27 RX ADMIN — Medication 10 ML: at 05:15

## 2021-10-27 RX ADMIN — IOPAMIDOL 100 ML: 755 INJECTION, SOLUTION INTRAVENOUS at 15:29

## 2021-10-27 RX ADMIN — ASPIRIN 81 MG: 81 TABLET ORAL at 08:32

## 2021-10-27 RX ADMIN — COLCHICINE 0.6 MG: 0.6 TABLET, FILM COATED ORAL at 08:32

## 2021-10-27 RX ADMIN — ONDANSETRON 4 MG: 2 INJECTION INTRAMUSCULAR; INTRAVENOUS at 19:46

## 2021-10-27 RX ADMIN — MONTELUKAST 10 MG: 10 TABLET, FILM COATED ORAL at 08:32

## 2021-10-27 RX ADMIN — LOSARTAN POTASSIUM 25 MG: 25 TABLET, FILM COATED ORAL at 08:31

## 2021-10-27 RX ADMIN — ACETAMINOPHEN 650 MG: 325 TABLET ORAL at 16:30

## 2021-10-27 NOTE — H&P (VIEW-ONLY)
[facility] Consult Note       Primary GI Physician: [facility]    Referring Provider:  Kelly Mccarty PA-C    Consulting GI Physician: Dr. Nikhil Avalos Date:  10/27/2021    Admit Date:  10/25/2021    Chief Complaint:  Abdominal pain    Subjective:     History of Present Illness:  Patient is a 66 y.o. female being seen in GI consult at the request of Kelly Mccarty PA-C for abdominal pain. She has PMH including but not limited to  permanent AF s/p recent Watchman placement on 10/19 (20 mm device), h/o NICM with improvement in EF to 55-60% (on 10/19 PREETHI), PHTN, HTN, LAKEISHA, dyslipidemia, minimal CAD by [facility] 2016, obesity s/p gastric bypass 2006 and mild chronic renal insufficiency. She was admitted 10/25/21 for chest pain, SOB, nausea, lightheadedness with near syncope 10/23. On admission, echo, PREETHI and LHC showed no reason for CP. Eliquis was resumed and Colchicine ordered for possible pericarditis/inflammation. This morning on exam, she c/o left-sided abdominal pain worse with palpation and movement. She c/o UTI symptoms and reported recurrent UTIs- urine sent for UA and returned positive for UTI. Hospitalist is consulted for assist. She c/o IBS and possible constipation but reports a BM yesterday that she feels was too small. She is followed by [facility] and per review of care everywhere, she was last seen 6/8/21 in Telemedicine visit for IBS-C, Reflux, SIBO. She reportedly had unremarkable CT A/P 10/2018, Colonoscopy 12/2018, Small bowel series 8/2019 other than constipation noted on imaging studies. Then constipation was reportedly controlled on MiraLax 1/2 capful daily and occasional abdominal pain was controlled with Bentyl prn which she was reportedly taking 2-3x monthly. Reflux was managed on Omeprazole 20mg every other day and EGD 4/2009 was reportedly negative for evidence of Borges's esophagus.   She has a hx of positive hydrogen breath test 7/2019 for SIBO and was treated with Keflex and Flagyl x7d. Now she reports periodic IBS flares with diffuse abdominal cramping, difficulty having BM, sweats followed by nausea with vomiting after which she improves but is just tired. She went a while without having a \"flare\" by using MiraLax 1/2 capful daily. She missed a few doses of MiraLax last week and on Friday did have an IBS flare. Beginning that day she experienced chest pain with nausea, mid left sided abdominal discomfort, near syncope while at a knitting class but later felt better after she sat down. This recurred Monday 10/25 and she called EMS and was ultimately admitted as above. She understands that cardiac source of her symptoms has been ruled out. She has continued to have mid left abdominal pain, different from her IBS pain. This is worse with movement. It is not affected by diet. She is having BMs, last of which was earlier this morning and good volume of non bloody stool. She did have indigestion last night but this has since resolved. She has had intermittent nausea without vomiting. She denies dysphagia, anorexia, unintentional weight loss. She is on ASA 81mg every day, Eliquis, Colchicine. She denies use of other blood thinners or NSAIDs, tobacco, or alcohol. Labs this admission include normal LFTs, Lipase, CRP. PMH:  Past Medical History:   Diagnosis Date    Arrhythmia     Arthritis     CAD (coronary artery disease)     Cancer ([facility] [address]     Congestive heart failure (HCC)     Essential hypertension     GERD (gastroesophageal reflux disease)     Hyperlipidemia     Morbid obesity (HCC)     Nausea & vomiting     Sleep apnea        PSH:  Past Surgical History:   Procedure Laterality Date    HX CHOLECYSTECTOMY      HX GASTRIC BYPASS      HX HYSTERECTOMY      HX ORTHOPAEDIC      right rotator cuff    HX ORTHOPAEDIC      bilateral hand surgeries    HX TONSILLECTOMY         Allergies:   Allergies   Allergen Reactions    Latex Rash    Other Food Unknown (comments)     Nuts brazil    Atorvastatin Unknown (comments)    Azo Pms [Black Cohosh-Chaste-Am. Ginseng] Itching    Codeine Itching    Hydrocodone Unknown (comments)    Nickel Rash    Oxycodone Unknown (comments)    Phenazopyridine Unknown (comments)     NOT ALLERGIC    Statins-Hmg-Coa Reductase Inhibitors Myalgia       Home Medications:  Prior to Admission medications    Medication Sig Start Date End Date Taking? Authorizing Provider   aspirin delayed-release 81 mg tablet Take 1 Tablet by mouth daily. 10/19/21  Yes TONYA Fernández   nabumetone (RELAFEN) 750 mg tablet Take 750 mg by mouth two (2) times a day. As needed   Yes Provider, Historical   losartan (COZAAR) 25 mg tablet TAKE ONE TABLET BY MOUTH ONCE DAILY. 6/3/21  Yes Tiana Maynard, DO   apixaban (ELIQUIS) 5 mg tablet Take 1 Tablet by mouth two (2) times a day. 6/3/21  Yes Tiana Maynard, DO   metoprolol succinate (TOPROL-XL) 25 mg XL tablet Take 1 Tablet by mouth daily. 1/2 qd 6/3/21  Yes Cynthia ESTRADA, DO   furosemide (Lasix) 40 mg tablet Take 1 Tablet by mouth daily. 6/3/21  Yes Tiana Maynard, DO   potassium chloride SR (KLOR-CON 10) 10 mEq tablet Take 1 Tablet by mouth daily. 6/3/21  Yes Tiana Maynard, DO   spironolactone (ALDACTONE) 25 mg tablet Take 1 Tablet by mouth daily. 6/3/21  Yes Tiana Maynard, DO   nitroglycerin (NITROSTAT) 0.4 mg SL tablet 1 Tablet by SubLINGual route every five (5) minutes as needed for Chest Pain. 6/3/21  Yes Tiana Maynard, DO   polyethylene glycol (Miralax) 17 gram/dose powder Take 17 g by mouth daily. Yes Provider, Historical   multivitamin (ONE A DAY) tablet Take 1 Tab by mouth daily. Yes Provider, Historical   estradiol (ESTRACE) 2 mg tablet Take 1 mg by mouth daily. 1/2 tablet daily   Yes Provider, Historical   montelukast (SINGULAIR) 10 mg tablet Take 10 mg by mouth daily.    Yes Provider, Historical   omeprazole (PRILOSEC) 20 mg capsule Take 20 mg by mouth every other day.   Yes Provider, Historical   fluticasone (FLONASE) 50 mcg/actuation nasal spray 2 Sprays by Both Nostrils route daily.     Provider, Historical       Hospital Medications:  Current Facility-Administered Medications   Medication Dose Route Frequency    acetaminophen (TYLENOL) tablet 650 mg  650 mg Oral Q6H PRN    nitroglycerin (NITROBID) 2 % ointment 1 Inch  1 Inch Topical Q6H PRN    0.9% sodium chloride infusion  75 mL/hr IntraVENous CONTINUOUS    lidocaine (XYLOCAINE) 2 % viscous solution 15 mL  15 mL Mouth/Throat PRN    midazolam (VERSED) injection 0.5-2 mg  0.5-2 mg IntraVENous Multiple    fentaNYL citrate (PF) injection 25-50 mcg  25-50 mcg IntraVENous Multiple    colchicine tablet 0.6 mg  0.6 mg Oral DAILY    sodium chloride (NS) flush 5-10 mL  5-10 mL IntraVENous Q8H    sodium chloride (NS) flush 5-10 mL  5-10 mL IntraVENous PRN    aspirin delayed-release tablet 81 mg  81 mg Oral DAILY    estradioL (ESTRACE) tablet 1 mg  1 mg Oral DAILY    fluticasone propionate (FLONASE) 50 mcg/actuation nasal spray 2 Spray  2 Spray Both Nostrils DAILY    losartan (COZAAR) tablet 25 mg  25 mg Oral DAILY    metoprolol succinate (TOPROL-XL) XL tablet 25 mg  25 mg Oral DAILY    montelukast (SINGULAIR) tablet 10 mg  10 mg Oral DAILY    nabumetone (RELAFEN) tablet 750 mg (Patient Supplied)  750 mg Oral BID PRN    pantoprazole (PROTONIX) tablet 40 mg  40 mg Oral ACB    polyethylene glycol (MIRALAX) packet 17 g  17 g Oral DAILY    spironolactone (ALDACTONE) tablet 25 mg  25 mg Oral DAILY    sodium chloride (NS) flush 5-40 mL  5-40 mL IntraVENous Q8H    sodium chloride (NS) flush 5-40 mL  5-40 mL IntraVENous PRN    nitroglycerin (NITROSTAT) tablet 0.4 mg  0.4 mg SubLINGual Q5MIN PRN    ondansetron (ZOFRAN) injection 4 mg  4 mg IntraVENous Q4H PRN    sodium chloride (NS) flush 5-40 mL  5-40 mL IntraVENous Q8H    sodium chloride (NS) flush 5-40 mL  5-40 mL IntraVENous PRN    influenza vaccine 2021-22 (6 mos+)(PF) (FLUARIX/FLULAVAL/FLUZONE QUAD) injection 0.5 mL  1 Each IntraMUSCular PRIOR TO DISCHARGE       Social History:  Social History     Tobacco Use    Smoking status: Never Smoker    Smokeless tobacco: Never Used   Substance Use Topics    Alcohol use: No       Pt denies any history of drug use, blood transfusions, or tattoos. Family History:  Family History   Problem Relation Age of Onset    Coronary Artery Disease Father     Heart Attack Father     Stroke Father     Sudden Death Father        Review of Systems:  A detailed 10 system ROS is obtained, with pertinent positives as listed above. All others are negative. Diet:      Objective:     Physical Exam:  Vitals:  Visit Vitals  /75   Pulse (!) 55   Temp 97.8 °F (36.6 °C)   Resp 18   Ht 5' 3\" (1.6 m)   Wt 96.9 kg (213 lb 11.2 oz)   SpO2 99%   BMI 37.86 kg/m²     Gen:  Pt is alert, cooperative, no acute distress  Skin:  Extremities and face reveal no rashes. HEENT: Sclerae anicteric. Extra-occular muscles are intact. No oral ulcers. No abnormal pigmentation of the lips. The neck is supple. Cardiovascular: Regular rate and rhythm. No murmurs, gallops, or rubs. Respiratory:  Comfortable breathing with no accessory muscle use. Clear breath sounds anteriorly with no wheezes, rales, or rhonchi. GI:  Abdomen nondistended, soft, and nontender. Normal active bowel sounds. No enlargement of the liver or spleen. No masses palpable. Rectal:  Deferred  Musculoskeletal:  No pitting edema of the lower legs. Neurological:  Gross memory appears intact. Patient is alert and oriented. Psychiatric:  Mood appears appropriate with judgement intact. Lymphatic:  No cervical or supraclavicular adenopathy.     Laboratory:    Recent Labs     10/27/21  0600 10/26/21  0426 10/25/21  1119   WBC  --  7.2 7.7   HGB  --  11.1* 11.8   HCT  --  35.7* 38.1   PLT  --  253 274   MCV  --  89.3 91.8   * 137 137   K 4.5 4.0 4.1    102 103   CO2 26 29 26   BUN 15 18 15   CREA 1.00 1.14* 1.09*   CA 8.1* 9.7 9.4   MG  --   --  2.4   GLU 93 95 97   AP  --   --  82   AST  --   --  15   ALT  --   --  17   TBILI  --   --  0.5   ALB  --   --  3.1*   TP  --   --  7.2   LPSE  --   --  80        CT a/p at [facility] 10/26/18 FINDINGS   CT lung base: No significant infiltrates or effusions. CT abdomen:   Again noted is the hemangioma in the dome of liver.  No other new lesions are seen within the liver, spleen, pancreas, adrenal glands.  Patient is post cholecystectomy. Kidneys demonstrates no masses or hydronephrosis.  No significant retroperitoneal adenopathy.  No mesenteric adenopathy. GI tract:  Postsurgical changes in the stomach from prior gastric bypass.  Small bowel loops are not significantly thickened or dilated.  The appendix is normal.  Small to moderate amount of stool seen the colon.  The colon is extremely tortuous.  There is some diverticulosis of the sigmoid colon without obvious diverticulitis. CT pelvis:  No pelvic masses or adenopathy is seen.  Patient status post hysterectomy.  Bladder is unremarkable.  Fat extends into the inguinal canal without herniated bowel. Musculoskeletal: Degenerative changes and facet disease is seen in the spine. IMPRESSION  1.  Sigmoid diverticulosis without evidence for diverticulitis. 2.  Small to moderate amount of stool in the colon    Small bowel series at [facility] 8/1/19  FINDINGS:    views demonstrate moderate colonic stool burden was prominent in the right:.  Postoperative changes gastric bypass in the left upper quadrant.  No evidence of bowel obstruction.  Prior cholecystectomy noted.      Contrast was administered. Kali Nanny is a small contrast present in the duodenum at 15 minutes.  No discrete contrast within the excluded stomach identified on initial image.       The small bowel is normal in caliber.  Normal appearance of the jejunum and ileum without evidence of a discrete mucosal lesion.  Contrast entered the colon at 120 minutes representing normal transit time. IMPRESSION:  1.  Moderate colonic stool burden suggests constipation. 2.  Otherwise normal small bowel follow-through. Assessment:     Active Problems:    Chest pain (10/25/2021)      66 y.o. female with PMH AFib s/p recent Watchman placement on 10/19 (20 mm device), h/o NICM with improvement in EF to 55-60% (on 10/19 PREETHI), PHTN, HTN, LAKEISHA, dyslipidemia, minimal CAD by [facility] 2016, mild chronic renal insufficiency, obesity s/p gastric bypass 2006, GERD, IBS, being seen in GI consult at the request of Teddy Ojeda PA-C for abdominal pain. She was asmitted 10/25/21 with chest pain, SOB, nausea, left mid to upper abdominal pain, near syncope and is now s/p cardiac workup without cardiac source identified. Now with ongoing left mid abdominal pain, indigestion last night, hx constipation but having BMs on MiraLax. Currently on Eliquis, ASA 81mg, Colchicine for possible pericarditis/inflammation. Also, being seen by hospitalist for UTI. Labs reveal normal LFTs, Lipase, CRP. Per [facility] note 6/2021- EGD 4/2009 was negative for Borges's and last Colonoscopy 12/2018, small bowel series 8/2019 were unremarkable except SB study noted moderate colonic stool burden. Notes Fhx abdominal aneurysm. Abdomen is soft, non-distended with diffuse audible bowel sounds and mild left mid abdominal ttp. Plan:     - Increase Protonix 40mg to twice daily.    - Add Sucralfate 1g qAC/HS. - Note hospitalist team plan for CT. Follow up results. - If pain continues, consider EGD tomorrow to evaluate for esophagitis, gastritis, PUD. Will make NPO after midnight and re-evaluate in the morning.   - Continue MiraLax 17g daily for bowel regularity. Will follow. Ara Stallworth PA-C  [facility]    Patient is seen and examined in collaboration with Dr. Art Olmos. Assessment and plan as per Dr. Art Olmos.

## 2021-10-27 NOTE — CONSULTS
[facility] Hospitalist Consult Note    Patient: Tosha Sosa Date: 10/27/2021  female, 66 y.o. Admit Date: 10/25/2021  Attending: Malcolm Reid MD     ASSESSMENT AND PLAN:     # Abdominal pain in the setting of known c-IBS  - agree with GI consult  - as painful mass palpated, will get CT abd/pelvis to further evaluate  - bowel regimen     # Acute uncomplicated cystitis  - Rocephin empirically (day 1)  - follow urine culture    # Chest pain/Afib  - per primary    Thank you for this consult. Hospitalist will follow along. Please page/call with questions or concerns. Discussed with patient and  at bedside. All questions answered. REASON FOR CONSULT:  I was asked to consult on this patient at the kind request of Elizabeth Siu MD, for positive UA. HISTORY OF PRESENT ILLNESS:      67 yo CF admitted to the cardiology service for chest discomfort, shortness of breath and near syncopal episode. She recently had Watchman device implanted for atrial fibrillation. She underwent uncomplicated LHC showing NOCAD. Hospitalist consulted for abdominal pain and concern for positive UA. Patient with known history of constipation-predominant IBS with flares in the past \"whenever I miss doses of Miralax. \" She has abdominal pain along LUQ (patient pointed) that is \"burning, I guess\" and does not radiate. Food intake and bowel movements do not improve these symptoms. She denies nausea or vomiting. She denies fevers/chills or shortness of breath. Last BM was earlier today. She does acknowledge more urinary frequency without burning. No back pain.      Patient Active Problem List   Diagnosis Code    Edema R60.9    Arthralgia of multiple joints M25.50    Herpes zoster B02.9    Malignant melanoma of skin of upper limb, including shoulder (HCC) C43.60    Esophageal reflux K21.9    Vitamin D deficiency E55.9    Hypercholesteremia E78.00    Hypertension A21    Systolic CHF, chronic ([facility]) I50.22    Shortness of breath R06.02    Chronic fatigue R53.82    Coronary artery disease involving native coronary artery of native heart without angina pectoris I25.10    Paroxysmal atrial fibrillation (HCC) I48.0    Sick sinus syndrome ([facility]) I49.5    Takotsubo cardiomyopathy I51.81    NICM (nonischemic cardiomyopathy) ([facility]) I42.8    Pulmonary HTN ([facility]) I27.20    Severe obesity ([facility]) E66.01    Snoring R06.83    Permanent atrial fibrillation ([facility]) I48.21    Chest pain R07.9    IBS (irritable bowel syndrome) K58.9    Cystitis N30.90       Allergy  Allergies   Allergen Reactions    Latex Rash    Other Food Unknown (comments)     Nuts brazil    Atorvastatin Unknown (comments)    Azo Pms [Black Cohosh-Chaste-Am. Ginseng] Itching    Codeine Itching    Hydrocodone Unknown (comments)    Nickel Rash    Oxycodone Unknown (comments)    Phenazopyridine Unknown (comments)     NOT ALLERGIC    Statins-Hmg-Coa Reductase Inhibitors Myalgia       Medication list  Prior to Admission Medications   Prescriptions Last Dose Informant Patient Reported? Taking? apixaban (ELIQUIS) 5 mg tablet 10/24/2021 at Unknown time  No Yes   Sig: Take 1 Tablet by mouth two (2) times a day. aspirin delayed-release 81 mg tablet 10/24/2021 at Unknown time  Yes Yes   Sig: Take 1 Tablet by mouth daily. estradiol (ESTRACE) 2 mg tablet 10/24/2021 at Unknown time  Yes Yes   Sig: Take 1 mg by mouth daily. 1/2 tablet daily   fluticasone (FLONASE) 50 mcg/actuation nasal spray Unknown at Unknown time  Yes No   Si Sprays by Both Nostrils route daily. furosemide (Lasix) 40 mg tablet 10/24/2021 at Unknown time  No Yes   Sig: Take 1 Tablet by mouth daily. losartan (COZAAR) 25 mg tablet 10/24/2021 at Unknown time  No Yes   Sig: TAKE ONE TABLET BY MOUTH ONCE DAILY. metoprolol succinate (TOPROL-XL) 25 mg XL tablet 10/24/2021 at Unknown time  No Yes   Sig: Take 1 Tablet by mouth daily.  1/2 qd   montelukast (SINGULAIR) 10 mg tablet 10/24/2021 at Unknown time Yes Yes   Sig: Take 10 mg by mouth daily. multivitamin (ONE A DAY) tablet 10/24/2021 at Unknown time  Yes Yes   Sig: Take 1 Tab by mouth daily. nabumetone (RELAFEN) 750 mg tablet 2021 at Unknown time  Yes Yes   Sig: Take 750 mg by mouth two (2) times a day. As needed   nitroglycerin (NITROSTAT) 0.4 mg SL tablet 10/25/2021 at Unknown time  No Yes   Si Tablet by SubLINGual route every five (5) minutes as needed for Chest Pain. omeprazole (PRILOSEC) 20 mg capsule 10/24/2021 at Unknown time  Yes Yes   Sig: Take 20 mg by mouth every other day. polyethylene glycol (Miralax) 17 gram/dose powder 10/24/2021 at Unknown time  Yes Yes   Sig: Take 17 g by mouth daily. potassium chloride SR (KLOR-CON 10) 10 mEq tablet 10/24/2021 at Unknown time  No Yes   Sig: Take 1 Tablet by mouth daily. spironolactone (ALDACTONE) 25 mg tablet 10/24/2021 at Unknown time  No Yes   Sig: Take 1 Tablet by mouth daily.       Facility-Administered Medications: None         Past Medical History  Past Medical History:   Diagnosis Date    Arrhythmia     Arthritis     CAD (coronary artery disease)     Cancer ([facility] [address]     Congestive heart failure (HCC)     Essential hypertension     GERD (gastroesophageal reflux disease)     Hyperlipidemia     Morbid obesity (HCC)     Nausea & vomiting     Sleep apnea        Past Surgical History:   Procedure Laterality Date    HX CHOLECYSTECTOMY      HX GASTRIC BYPASS      HX HYSTERECTOMY      HX ORTHOPAEDIC      right rotator cuff    HX ORTHOPAEDIC      bilateral hand surgeries    HX TONSILLECTOMY         Social History  Social History     Socioeconomic History    Marital status:      Spouse name: Not on file    Number of children: Not on file    Years of education: Not on file    Highest education level: Not on file   Tobacco Use    Smoking status: Never Smoker    Smokeless tobacco: Never Used   Substance and Sexual Activity    Alcohol use: No     Social Determinants of Health     Financial Resource Strain:     Difficulty of Paying Living Expenses:    Food Insecurity:     Worried About Running Out of Food in the Last Year:     [address] in the Last Year:    Transportation Needs:     Lack of Transportation (Medical):  Lack of Transportation (Non-Medical):    Physical Activity:     Days of Exercise per Week:     Minutes of Exercise per Session:    Stress:     Feeling of Stress :    Social Connections:     Frequency of Communication with Friends and Family:     Frequency of Social Gatherings with Friends and Family:     Attends Jew Services:     Active Member of Clubs or Organizations:     Attends Club or Organization Meetings:     Marital Status:        Family History:   Family History   Problem Relation Age of Onset    Coronary Artery Disease Father     Heart Attack Father     Stroke Father     Sudden Death Father        REVIEW OF SYSTEMS:   A 14 point review of systems was taken and pertinent positive as per HPI. PHYSICAL EXAMINATION:  Vital 24 Hour Range Most Recent Value  Temperature Temp  Min: 97.5 °F (36.4 °C)  Max: 98 °F (36.7 °C) 97.5 °F (36.4 °C)    Pulse Pulse  Min: 55  Max: 60 60  Respiratory Resp  Min: 16  Max: 18 18  Blood Pressure BP  Min: 107/51  Max: 132/61 118/75  Pulse Oximetry SpO2  Min: 96 %  Max: 99 % 98 %  O2 No data recorded      Vital Most Recent Value First Value  Weight 96.9 kg (213 lb 11.2 oz) Weight: 97.5 kg (215 lb)  Height 5' 3\" (160 cm) Height: 5' 3\" (160 cm)  BMI   N/A    Physical Exam:   General: No acute distress, speaking in full sentences, no use of accessory muscles   HEENT: Pupils equal and reactive to light and accommodation, oropharynx is clear   Neck: Supple, no lymphadenopathy, no JVD   Lungs: Clear to auscultation bilaterally. Nonlabored. No wheezing.    Cardiovascular: Regular rate and rhythm with normal S1 and S2   Abdomen: soft, nondistended, palpable mass in LUQ region, BS present, +suprapubic tenderness, no CVA tenderness, no rebound tenderness  Extremities: No cyanosis clubbing or edema   Neuro: Nonfocal, A&O x3   Psych: Normal affect     Intake/Output last 3 shifts:  Date 10/26/21 0700 - 10/27/21 0659 10/27/21 0700 - 10/28/21 0659   Shift 1705-1643 1983-5162 24 Hour Total 0668-8177 8140-2691 24 Hour Total   INTAKE   P.O. 0  0 380  380     P. O. 0  0 380  380   Shift Total(mL/kg) 0(0)  0(0) 380(3.9)  380(3.9)   OUTPUT   Urine(mL/kg/hr) 625(0.5) 850(0.7) 1475(0.6)        Urine Voided       Stool           Stool Occurrence(s)  0 x 0 x      Shift Total(mL/kg) 625(6.5) 850(8.8) 1475(15.2)      NET -625 -850 -1475 380  380   Weight (kg) 96.6 96.9 96.9 96.9 96.9 96.9       Labs:  magnesium:7,phos:7)CMP:   Lab Results   Component Value Date/Time     (L) 10/27/2021 06:00 AM    K 4.5 10/27/2021 06:00 AM     10/27/2021 06:00 AM    CO2 26 10/27/2021 06:00 AM    AGAP 6 (L) 10/27/2021 06:00 AM    GLU 93 10/27/2021 06:00 AM    BUN 15 10/27/2021 06:00 AM    CREA 1.00 10/27/2021 06:00 AM    GFRAA >60 10/27/2021 06:00 AM    GFRNA 58 (L) 10/27/2021 06:00 AM    CA 8.1 (L) 10/27/2021 06:00 AM         CBC:  No results found for: WBC, HGB, HCT, PLT, HGBEXT, HCTEXT, PLTEXT, HGBEXT, HCTEXT, PLTEXT    Lab Results   Component Value Date/Time    INR 1.2 10/18/2021 03:10 PM    INR 1.4 08/03/2021 08:15 AM    INR 1.0 09/30/2016 01:00 PM    Prothrombin time 15.2 (H) 10/18/2021 03:10 PM    Prothrombin time 17.4 (H) 08/03/2021 08:15 AM    Prothrombin time 10.8 09/30/2016 01:00 PM       ABG:  No results found for: PH, PHI, PCO2, PCO2I, PO2, PO2I, HCO3, HCO3I, FIO2, FIO2I        Lab Results   Component Value Date/Time    Troponin-I, Qt. 0.12 (HH) 05/28/2017 01:55 AM     (H) 01/29/2019 12:43 PM     09/30/2016 01:00 PM       Imagining & Other Studies    XR Results (maximum last 3):   Results from [place] encounter on 10/25/21    XR CHEST PA LAT    Narrative  PA AND LATERAL CHEST X-RAY. Clinical Indication: Chest pain for one week    Comparison: Chest x-ray dated 11/14/2018    Findings: 2 views of the chest submitted demonstrate the cardiac silhouette and  mediastinum to be unremarkable. There is no pleural effusion or pneumothorax. The lung parenchyma is clear. The included osseous structures are unremarkable. Impression  No acute cardiopulmonary abnormality. Results from [place] encounter on 11/14/18    XR CHEST PA LAT    Narrative  PA AND LATERAL CHEST X-RAY. Clinical Indication: Pulmonary hypertension    Comparison: Chest x-ray dated 11/7/2018    Findings: 2 views of the chest submitted demonstrate the cardiac silhouette and  mediastinum to be unremarkable. There is no pleural effusion or pneumothorax. The lung parenchyma is clear. The included osseous structures are unremarkable. Impression  Impression: No acute abnormality. Results from [place] encounter on 11/07/18    XR CHEST PA LAT    Narrative  TWO-VIEW CHEST:    CLINICAL HISTORY: Shortness of breath with dyspnea on exertion for 2 months. History of pulmonary hypertension. COMPARISON:  May 27, 2017. FINDINGS: PA and lateral chest images demonstrate no acute pneumonic infiltrate  or significant pleural fluid collection. The heart size is within normal limits  without evidence of congestive heart failure or pneumothorax. The bony thorax  appears intact on these views with mild dextroconvex scoliosis and multilevel  spondylosis. .    Impression  IMPRESSION:  NO ACUTE CARDIOPULMONARY DISEASE IDENTIFIED. CT Results (maximum last 3): No results found for this or any previous visit. MRI Results (maximum last 3): No results found for this or any previous visit. Nuclear Medicine Results (maximum last 3):   Results from [place] encounter on 11/14/18    NM LUNG PERFUSION W VENT    Narrative  Nuclear medicine VQ scan    CLINICAL INDICATION: Pulmonary hypertension    PROCEDURE: After appropriate patient preparation, the patient was administered  42.0 mCi of technetium labeled DTPA for five minutes via nebulizer for the  ventilation portion of the scan. The patient was then administered 6.1 mCi of  technetium labeled MAA particles for the perfusion portion of the scan. COMPARISON: Chest x-ray from the same day. FINDINGS: There is homogeneous distribution of tracer throughout the lung  parenchyma on the ventilation portion of the scan. Homogeneous distribution of  tracer is also appreciated throughout the lung parenchyma on the perfusion  portion of the exam. No photopenic defects appreciated. Impression  IMPRESSION: Normal VQ scan. No scintigraphic evidence for pulmonary embolus. US Results (maximum last 3): No results found for this or any previous visit. DEXA Results (maximum last 3): No results found for this or any previous visit. ERI Results (maximum last 3): No results found for this or any previous visit. IR Results (maximum last 3): No results found for this or any previous visit. VAS/US Results (maximum last 3): No results found for this or any previous visit. PET Results (maximum last 3): No results found for this or any previous visit.         EKG Results     Procedure 720 Value Units Date/Time    EKG [[id number]] Collected: 10/25/21 1112    Order Status: Completed Updated: 10/26/21 0700     Ventricular Rate 64 BPM      Atrial Rate 67 BPM      QRS Duration 108 ms      Q-T Interval 416 ms      QTC Calculation (Bezet) 429 ms      Calculated R Axis -29 degrees      Calculated T Axis 51 degrees      Diagnosis --     Atrial fibrillation  Incomplete left bundle branch block  Minimal voltage criteria for LVH, may be normal variant  Abnormal ECG  When compared with ECG of 19-OCT-2021 08:06,  T wave inversion no longer evident in Inferior leads  Nonspecific T wave abnormality, improved in Anterolateral leads  Confirmed by Alan Rose ([id number]) on 10/26/2021 6:59:52 AM              Thank you Isaias Bumpers, MD for allowing us to participate in the care of this interesting patient. We shall follow with you.     Kwabena Og DO  10/27/2021 12:37 PM

## 2021-10-27 NOTE — CONSULTS
[facility] Consult Note       Primary GI Physician: [facility]    Referring Provider:  Katy Norton PA-C    Consulting GI Physician: Dr. Artemio Garcia Date:  10/27/2021    Admit Date:  10/25/2021    Chief Complaint:  Abdominal pain    Subjective:     History of Present Illness:  Patient is a 66 y.o. female being seen in GI consult at the request of Katy Norton PA-C for abdominal pain. She has PMH including but not limited to  permanent AF s/p recent Watchman placement on 10/19 (20 mm device), h/o NICM with improvement in EF to 55-60% (on 10/19 PREETHI), PHTN, HTN, LAKEISHA, dyslipidemia, minimal CAD by [facility] 2016, obesity s/p gastric bypass 2006 and mild chronic renal insufficiency. She was admitted 10/25/21 for chest pain, SOB, nausea, lightheadedness with near syncope 10/23. On admission, echo, PREETHI and LHC showed no reason for CP. Eliquis was resumed and Colchicine ordered for possible pericarditis/inflammation. This morning on exam, she c/o left-sided abdominal pain worse with palpation and movement. She c/o UTI symptoms and reported recurrent UTIs- urine sent for UA and returned positive for UTI. Hospitalist is consulted for assist. She c/o IBS and possible constipation but reports a BM yesterday that she feels was too small. She is followed by [facility] and per review of care everywhere, she was last seen 6/8/21 in Telemedicine visit for IBS-C, Reflux, SIBO. She reportedly had unremarkable CT A/P 10/2018, Colonoscopy 12/2018, Small bowel series 8/2019 other than constipation noted on imaging studies. Then constipation was reportedly controlled on MiraLax 1/2 capful daily and occasional abdominal pain was controlled with Bentyl prn which she was reportedly taking 2-3x monthly. Reflux was managed on Omeprazole 20mg every other day and EGD 4/2009 was reportedly negative for evidence of Borges's esophagus.   She has a hx of positive hydrogen breath test 7/2019 for SIBO and was treated with Keflex and Flagyl x7d. Now she reports periodic IBS flares with diffuse abdominal cramping, difficulty having BM, sweats followed by nausea with vomiting after which she improves but is just tired. She went a while without having a \"flare\" by using MiraLax 1/2 capful daily. She missed a few doses of MiraLax last week and on Friday did have an IBS flare. Beginning that day she experienced chest pain with nausea, mid left sided abdominal discomfort, near syncope while at a knitting class but later felt better after she sat down. This recurred Monday 10/25 and she called EMS and was ultimately admitted as above. She understands that cardiac source of her symptoms has been ruled out. She has continued to have mid left abdominal pain, different from her IBS pain. This is worse with movement. It is not affected by diet. She is having BMs, last of which was earlier this morning and good volume of non bloody stool. She did have indigestion last night but this has since resolved. She has had intermittent nausea without vomiting. She denies dysphagia, anorexia, unintentional weight loss. She is on ASA 81mg every day, Eliquis, Colchicine. She denies use of other blood thinners or NSAIDs, tobacco, or alcohol. Labs this admission include normal LFTs, Lipase, CRP. PMH:  Past Medical History:   Diagnosis Date    Arrhythmia     Arthritis     CAD (coronary artery disease)     Cancer ([facility] [address]     Congestive heart failure (HCC)     Essential hypertension     GERD (gastroesophageal reflux disease)     Hyperlipidemia     Morbid obesity (HCC)     Nausea & vomiting     Sleep apnea        PSH:  Past Surgical History:   Procedure Laterality Date    HX CHOLECYSTECTOMY      HX GASTRIC BYPASS      HX HYSTERECTOMY      HX ORTHOPAEDIC      right rotator cuff    HX ORTHOPAEDIC      bilateral hand surgeries    HX TONSILLECTOMY         Allergies:   Allergies   Allergen Reactions    Latex Rash    Other Food Unknown (comments)     Nuts brazil    Atorvastatin Unknown (comments)    Azo Pms [Black Cohosh-Chaste-Am. Ginseng] Itching    Codeine Itching    Hydrocodone Unknown (comments)    Nickel Rash    Oxycodone Unknown (comments)    Phenazopyridine Unknown (comments)     NOT ALLERGIC    Statins-Hmg-Coa Reductase Inhibitors Myalgia       Home Medications:  Prior to Admission medications    Medication Sig Start Date End Date Taking? Authorizing Provider   aspirin delayed-release 81 mg tablet Take 1 Tablet by mouth daily. 10/19/21  Yes TONYA Alanis   nabumetone (RELAFEN) 750 mg tablet Take 750 mg by mouth two (2) times a day. As needed   Yes Provider, Historical   losartan (COZAAR) 25 mg tablet TAKE ONE TABLET BY MOUTH ONCE DAILY. 6/3/21  Yes Jean-Paul Centeno DO   apixaban (ELIQUIS) 5 mg tablet Take 1 Tablet by mouth two (2) times a day. 6/3/21  Yes Jean-Paul Centeno DO   metoprolol succinate (TOPROL-XL) 25 mg XL tablet Take 1 Tablet by mouth daily. 1/2 qd 6/3/21  Yes Ca ESTRADA,    furosemide (Lasix) 40 mg tablet Take 1 Tablet by mouth daily. 6/3/21  Yes Jean-Paul Centeno DO   potassium chloride SR (KLOR-CON 10) 10 mEq tablet Take 1 Tablet by mouth daily. 6/3/21  Yes Jean-Paul Centeno DO   spironolactone (ALDACTONE) 25 mg tablet Take 1 Tablet by mouth daily. 6/3/21  Yes Jean-Paul Centeno DO   nitroglycerin (NITROSTAT) 0.4 mg SL tablet 1 Tablet by SubLINGual route every five (5) minutes as needed for Chest Pain. 6/3/21  Yes Jean-Paul Centeno DO   polyethylene glycol (Miralax) 17 gram/dose powder Take 17 g by mouth daily. Yes Provider, Historical   multivitamin (ONE A DAY) tablet Take 1 Tab by mouth daily. Yes Provider, Historical   estradiol (ESTRACE) 2 mg tablet Take 1 mg by mouth daily. 1/2 tablet daily   Yes Provider, Historical   montelukast (SINGULAIR) 10 mg tablet Take 10 mg by mouth daily.    Yes Provider, Historical   omeprazole (PRILOSEC) 20 mg capsule Take 20 mg by mouth every other day.   Yes Provider, Historical   fluticasone (FLONASE) 50 mcg/actuation nasal spray 2 Sprays by Both Nostrils route daily.     Provider, Historical       Hospital Medications:  Current Facility-Administered Medications   Medication Dose Route Frequency    acetaminophen (TYLENOL) tablet 650 mg  650 mg Oral Q6H PRN    nitroglycerin (NITROBID) 2 % ointment 1 Inch  1 Inch Topical Q6H PRN    0.9% sodium chloride infusion  75 mL/hr IntraVENous CONTINUOUS    lidocaine (XYLOCAINE) 2 % viscous solution 15 mL  15 mL Mouth/Throat PRN    midazolam (VERSED) injection 0.5-2 mg  0.5-2 mg IntraVENous Multiple    fentaNYL citrate (PF) injection 25-50 mcg  25-50 mcg IntraVENous Multiple    colchicine tablet 0.6 mg  0.6 mg Oral DAILY    sodium chloride (NS) flush 5-10 mL  5-10 mL IntraVENous Q8H    sodium chloride (NS) flush 5-10 mL  5-10 mL IntraVENous PRN    aspirin delayed-release tablet 81 mg  81 mg Oral DAILY    estradioL (ESTRACE) tablet 1 mg  1 mg Oral DAILY    fluticasone propionate (FLONASE) 50 mcg/actuation nasal spray 2 Spray  2 Spray Both Nostrils DAILY    losartan (COZAAR) tablet 25 mg  25 mg Oral DAILY    metoprolol succinate (TOPROL-XL) XL tablet 25 mg  25 mg Oral DAILY    montelukast (SINGULAIR) tablet 10 mg  10 mg Oral DAILY    nabumetone (RELAFEN) tablet 750 mg (Patient Supplied)  750 mg Oral BID PRN    pantoprazole (PROTONIX) tablet 40 mg  40 mg Oral ACB    polyethylene glycol (MIRALAX) packet 17 g  17 g Oral DAILY    spironolactone (ALDACTONE) tablet 25 mg  25 mg Oral DAILY    sodium chloride (NS) flush 5-40 mL  5-40 mL IntraVENous Q8H    sodium chloride (NS) flush 5-40 mL  5-40 mL IntraVENous PRN    nitroglycerin (NITROSTAT) tablet 0.4 mg  0.4 mg SubLINGual Q5MIN PRN    ondansetron (ZOFRAN) injection 4 mg  4 mg IntraVENous Q4H PRN    sodium chloride (NS) flush 5-40 mL  5-40 mL IntraVENous Q8H    sodium chloride (NS) flush 5-40 mL  5-40 mL IntraVENous PRN    influenza vaccine 2021-22 (6 mos+)(PF) (FLUARIX/FLULAVAL/FLUZONE QUAD) injection 0.5 mL  1 Each IntraMUSCular PRIOR TO DISCHARGE       Social History:  Social History     Tobacco Use    Smoking status: Never Smoker    Smokeless tobacco: Never Used   Substance Use Topics    Alcohol use: No       Pt denies any history of drug use, blood transfusions, or tattoos. Family History:  Family History   Problem Relation Age of Onset    Coronary Artery Disease Father     Heart Attack Father     Stroke Father     Sudden Death Father        Review of Systems:  A detailed 10 system ROS is obtained, with pertinent positives as listed above. All others are negative. Diet:      Objective:     Physical Exam:  Vitals:  Visit Vitals  /75   Pulse (!) 55   Temp 97.8 °F (36.6 °C)   Resp 18   Ht 5' 3\" (1.6 m)   Wt 96.9 kg (213 lb 11.2 oz)   SpO2 99%   BMI 37.86 kg/m²     Gen:  Pt is alert, cooperative, no acute distress  Skin:  Extremities and face reveal no rashes. HEENT: Sclerae anicteric. Extra-occular muscles are intact. No oral ulcers. No abnormal pigmentation of the lips. The neck is supple. Cardiovascular: Regular rate and rhythm. No murmurs, gallops, or rubs. Respiratory:  Comfortable breathing with no accessory muscle use. Clear breath sounds anteriorly with no wheezes, rales, or rhonchi. GI:  Abdomen nondistended, soft, and nontender. Normal active bowel sounds. No enlargement of the liver or spleen. No masses palpable. Rectal:  Deferred  Musculoskeletal:  No pitting edema of the lower legs. Neurological:  Gross memory appears intact. Patient is alert and oriented. Psychiatric:  Mood appears appropriate with judgement intact. Lymphatic:  No cervical or supraclavicular adenopathy.     Laboratory:    Recent Labs     10/27/21  0600 10/26/21  0426 10/25/21  1119   WBC  --  7.2 7.7   HGB  --  11.1* 11.8   HCT  --  35.7* 38.1   PLT  --  253 274   MCV  --  89.3 91.8   * 137 137   K 4.5 4.0 4.1    102 103   CO2 26 29 26   BUN 15 18 15   CREA 1.00 1.14* 1.09*   CA 8.1* 9.7 9.4   MG  --   --  2.4   GLU 93 95 97   AP  --   --  82   AST  --   --  15   ALT  --   --  17   TBILI  --   --  0.5   ALB  --   --  3.1*   TP  --   --  7.2   LPSE  --   --  80        CT a/p at [facility] 10/26/18 FINDINGS   CT lung base: No significant infiltrates or effusions. CT abdomen:   Again noted is the hemangioma in the dome of liver.  No other new lesions are seen within the liver, spleen, pancreas, adrenal glands.  Patient is post cholecystectomy. Kidneys demonstrates no masses or hydronephrosis.  No significant retroperitoneal adenopathy.  No mesenteric adenopathy. GI tract:  Postsurgical changes in the stomach from prior gastric bypass.  Small bowel loops are not significantly thickened or dilated.  The appendix is normal.  Small to moderate amount of stool seen the colon.  The colon is extremely tortuous.  There is some diverticulosis of the sigmoid colon without obvious diverticulitis. CT pelvis:  No pelvic masses or adenopathy is seen.  Patient status post hysterectomy.  Bladder is unremarkable.  Fat extends into the inguinal canal without herniated bowel. Musculoskeletal: Degenerative changes and facet disease is seen in the spine. IMPRESSION  1.  Sigmoid diverticulosis without evidence for diverticulitis. 2.  Small to moderate amount of stool in the colon    Small bowel series at [facility] 8/1/19  FINDINGS:    views demonstrate moderate colonic stool burden was prominent in the right:.  Postoperative changes gastric bypass in the left upper quadrant.  No evidence of bowel obstruction.  Prior cholecystectomy noted.      Contrast was administered. Donald Toro is a small contrast present in the duodenum at 15 minutes.  No discrete contrast within the excluded stomach identified on initial image.       The small bowel is normal in caliber.  Normal appearance of the jejunum and ileum without evidence of a discrete mucosal lesion.  Contrast entered the colon at 120 minutes representing normal transit time. IMPRESSION:  1.  Moderate colonic stool burden suggests constipation. 2.  Otherwise normal small bowel follow-through. Assessment:     Active Problems:    Chest pain (10/25/2021)      66 y.o. female with PMH AFib s/p recent Watchman placement on 10/19 (20 mm device), h/o NICM with improvement in EF to 55-60% (on 10/19 PREETHI), PHTN, HTN, LAKEISHA, dyslipidemia, minimal CAD by [facility] 2016, mild chronic renal insufficiency, obesity s/p gastric bypass 2006, GERD, IBS, being seen in GI consult at the request of Jose Palacios PA-C for abdominal pain. She was asmitted 10/25/21 with chest pain, SOB, nausea, left mid to upper abdominal pain, near syncope and is now s/p cardiac workup without cardiac source identified. Now with ongoing left mid abdominal pain, indigestion last night, hx constipation but having BMs on MiraLax. Currently on Eliquis, ASA 81mg, Colchicine for possible pericarditis/inflammation. Also, being seen by hospitalist for UTI. Labs reveal normal LFTs, Lipase, CRP. Per [facility] note 6/2021- EGD 4/2009 was negative for Borges's and last Colonoscopy 12/2018, small bowel series 8/2019 were unremarkable except SB study noted moderate colonic stool burden. Notes Fhx abdominal aneurysm. Abdomen is soft, non-distended with diffuse audible bowel sounds and mild left mid abdominal ttp. Plan:     - Increase Protonix 40mg to twice daily.    - Add Sucralfate 1g qAC/HS. - Note hospitalist team plan for CT. Follow up results. - If pain continues, consider EGD tomorrow to evaluate for esophagitis, gastritis, PUD. Will make NPO after midnight and re-evaluate in the morning.   - Continue MiraLax 17g daily for bowel regularity. Will follow. Reena Hart PA-C  [facility]    Patient is seen and examined in collaboration with Dr. Lani Hill. Assessment and plan as per Dr. Lani Hlil.

## 2021-10-27 NOTE — ROUTINE PROCESS
Bedside and Verbal shift change report given to Leopoldo Frames, RN (oncoming nurse) by self (offgoing nurse). Report included the following information SBAR, Kardex, Procedure Summary, Intake/Output, MAR and Recent Results.

## 2021-10-27 NOTE — PROGRESS NOTES
[facility] CARDIOLOGY PROGRESS NOTE           10/27/2021 8:44 AM    Admit Date: 10/25/2021      Subjective:   66 y.o. WF with h/o permanent AF s/p recent Watchman placement on 10/19 (20 mm device), h/o NICM with improvement in EF to 55-60% (on 10/19 PREETHI), PHTN, HTN, LAKEISHA, dyslipidemia, minimal CAD by [facility] 2016, obesity s/p gastric bypass 2006 and mild chronic renal insufficiency who had a CP, SOB and near syncopal episode on Saturday. She described 8/10 chest pain with radiation to her left neck, jaw and abdomen. She felt associated SOB, nausea and lightheadedness with near syncope. She was admitted and echo, PREETHI and LHC showed no reason for CP. This morning on exam, she c/o left-sided abdominal pain worse with palpation and movement. She c/o UTI symptoms and reports recurrent UTIs- urine sent for UA. She c/o IBS and possible constipation but reports a BM yesterday that she feels was too small. ROS:  Cardiovascular:  As noted above    Objective:      Vitals:    10/26/21 2046 10/27/21 0054 10/27/21 0456 10/27/21 0755   BP: 112/75 112/61 116/63 113/75   Pulse: 60 (!) 55 (!) 57 (!) 55   Resp: 18 16 18 18   Temp: 97.7 °F (36.5 °C) 98 °F (36.7 °C) 97.8 °F (36.6 °C) 97.8 °F (36.6 °C)   SpO2: 96% 96% 96% 99%   Weight:   96.9 kg (213 lb 11.2 oz)    Height:           Physical Exam:  General-No Acute Distress  Neck- supple, no JVD  CV- regular rate and rhythm no MRG  Lung- clear bilaterally  Abd- soft, tender left side, nondistended  Ext- no edema bilaterally.   Skin- warm and dry    Data Review:   Recent Labs     10/27/21  0600 10/26/21  0426 10/25/21  1119 10/25/21  1119   * 137   < > 137   K 4.5 4.0   < > 4.1   MG  --   --   --  2.4   BUN 15 18   < > 15   CREA 1.00 1.14*   < > 1.09*   GLU 93 95   < > 97   WBC  --  7.2  --  7.7   HGB  --  11.1*  --  11.8   HCT  --  35.7*  --  38.1   PLT  --  253  --  274   CHOL  --  240*  --   --    LDLC  --  155.4*  --   --    HDL  --  63*  --   --     < > = values in this interval not displayed.        Assessment/Plan:         Recurrent UTI, now with symptoms- check UA       Abdominal pain- left-sided, new complaint- ask GI to evaluate      Chest pain/SOB- resolved, work up negative, continue ASA, Toprol, ARB, intolerant to statins, resume Eliquis, Colchicine for possible pericarditis/inflammation    Dizziness/lightheadedness and near syncope- resolved    Permanent atrial fibrillation s/p Watchman on 10/19/21- on ASA, Eliquis, rate controlled on Toprol XL    Pulmonary HTN ([address]    H/o NICM (nonischemic cardiomyopathy) however EF improved and normalized on recent echo- on Toprol XL, ARB and Lasix    Hypertension- controlled, monitor    Minimal CAD (coronary artery disease) by cath, continue ASA    Hypercholesteremia- intolerant to statins    Severe obesity ([address]    Esophageal reflux- PPI       Neisha Bean PA-C  10/27/2021 8:44 AM

## 2021-10-28 ENCOUNTER — ANESTHESIA (OUTPATIENT)
Dept: ENDOSCOPY | Age: 71
DRG: 392 | End: 2021-10-28
Payer: MEDICARE

## 2021-10-28 ENCOUNTER — ANESTHESIA EVENT (OUTPATIENT)
Dept: ENDOSCOPY | Age: 71
DRG: 392 | End: 2021-10-28
Payer: MEDICARE

## 2021-10-28 LAB
ANION GAP SERPL CALC-SCNC: 5 MMOL/L (ref 7–16)
BUN SERPL-MCNC: 12 MG/DL (ref 8–23)
CALCIUM SERPL-MCNC: 9.1 MG/DL (ref 8.3–10.4)
CHLORIDE SERPL-SCNC: 100 MMOL/L (ref 98–107)
CO2 SERPL-SCNC: 29 MMOL/L (ref 21–32)
CREAT SERPL-MCNC: 1.04 MG/DL (ref 0.6–1)
GLUCOSE SERPL-MCNC: 100 MG/DL (ref 65–100)
POTASSIUM SERPL-SCNC: 4 MMOL/L (ref 3.5–5.1)
SODIUM SERPL-SCNC: 134 MMOL/L (ref 136–145)

## 2021-10-28 PROCEDURE — 74011250637 HC RX REV CODE- 250/637: Performed by: INTERNAL MEDICINE

## 2021-10-28 PROCEDURE — 88312 SPECIAL STAINS GROUP 1: CPT

## 2021-10-28 PROCEDURE — 77030021593 HC FCPS BIOP ENDOSC BSC -A: Performed by: INTERNAL MEDICINE

## 2021-10-28 PROCEDURE — 0DB68ZX EXCISION OF STOMACH, VIA NATURAL OR ARTIFICIAL OPENING ENDOSCOPIC, DIAGNOSTIC: ICD-10-PCS | Performed by: INTERNAL MEDICINE

## 2021-10-28 PROCEDURE — 86677 HELICOBACTER PYLORI ANTIBODY: CPT

## 2021-10-28 PROCEDURE — 74011000250 HC RX REV CODE- 250: Performed by: NURSE ANESTHETIST, CERTIFIED REGISTERED

## 2021-10-28 PROCEDURE — 36415 COLL VENOUS BLD VENIPUNCTURE: CPT

## 2021-10-28 PROCEDURE — 2709999900 HC NON-CHARGEABLE SUPPLY: Performed by: INTERNAL MEDICINE

## 2021-10-28 PROCEDURE — 74011250636 HC RX REV CODE- 250/636: Performed by: INTERNAL MEDICINE

## 2021-10-28 PROCEDURE — 76060000031 HC ANESTHESIA FIRST 0.5 HR: Performed by: INTERNAL MEDICINE

## 2021-10-28 PROCEDURE — 74011250637 HC RX REV CODE- 250/637: Performed by: PHYSICIAN ASSISTANT

## 2021-10-28 PROCEDURE — 74011250636 HC RX REV CODE- 250/636: Performed by: NURSE ANESTHETIST, CERTIFIED REGISTERED

## 2021-10-28 PROCEDURE — 74011000258 HC RX REV CODE- 258: Performed by: INTERNAL MEDICINE

## 2021-10-28 PROCEDURE — 65660000000 HC RM CCU STEPDOWN

## 2021-10-28 PROCEDURE — 99232 SBSQ HOSP IP/OBS MODERATE 35: CPT | Performed by: INTERNAL MEDICINE

## 2021-10-28 PROCEDURE — 80048 BASIC METABOLIC PNL TOTAL CA: CPT

## 2021-10-28 PROCEDURE — 88305 TISSUE EXAM BY PATHOLOGIST: CPT

## 2021-10-28 PROCEDURE — 76040000025: Performed by: INTERNAL MEDICINE

## 2021-10-28 RX ORDER — SODIUM CHLORIDE, SODIUM LACTATE, POTASSIUM CHLORIDE, CALCIUM CHLORIDE 600; 310; 30; 20 MG/100ML; MG/100ML; MG/100ML; MG/100ML
INJECTION, SOLUTION INTRAVENOUS
Status: DISCONTINUED | OUTPATIENT
Start: 2021-10-28 | End: 2021-10-28 | Stop reason: HOSPADM

## 2021-10-28 RX ORDER — METOPROLOL SUCCINATE 25 MG/1
12.5 TABLET, EXTENDED RELEASE ORAL DAILY
Status: DISCONTINUED | OUTPATIENT
Start: 2021-10-29 | End: 2021-10-28

## 2021-10-28 RX ORDER — SODIUM CHLORIDE 9 MG/ML
100 INJECTION, SOLUTION INTRAVENOUS CONTINUOUS
Status: DISPENSED | OUTPATIENT
Start: 2021-10-28 | End: 2021-10-28

## 2021-10-28 RX ORDER — LOSARTAN POTASSIUM 25 MG/1
12.5 TABLET ORAL DAILY
Status: DISCONTINUED | OUTPATIENT
Start: 2021-10-28 | End: 2021-10-29 | Stop reason: HOSPADM

## 2021-10-28 RX ORDER — LIDOCAINE HYDROCHLORIDE 20 MG/ML
INJECTION, SOLUTION EPIDURAL; INFILTRATION; INTRACAUDAL; PERINEURAL AS NEEDED
Status: DISCONTINUED | OUTPATIENT
Start: 2021-10-28 | End: 2021-10-28 | Stop reason: HOSPADM

## 2021-10-28 RX ORDER — SODIUM CHLORIDE, SODIUM LACTATE, POTASSIUM CHLORIDE, CALCIUM CHLORIDE 600; 310; 30; 20 MG/100ML; MG/100ML; MG/100ML; MG/100ML
1000 INJECTION, SOLUTION INTRAVENOUS CONTINUOUS
Status: DISCONTINUED | OUTPATIENT
Start: 2021-10-28 | End: 2021-10-28 | Stop reason: HOSPADM

## 2021-10-28 RX ORDER — LOSARTAN POTASSIUM 25 MG/1
12.5 TABLET ORAL DAILY
Status: DISCONTINUED | OUTPATIENT
Start: 2021-10-29 | End: 2021-10-28

## 2021-10-28 RX ORDER — METOPROLOL SUCCINATE 25 MG/1
12.5 TABLET, EXTENDED RELEASE ORAL DAILY
Status: DISCONTINUED | OUTPATIENT
Start: 2021-10-28 | End: 2021-10-29 | Stop reason: HOSPADM

## 2021-10-28 RX ORDER — PROPOFOL 10 MG/ML
INJECTION, EMULSION INTRAVENOUS AS NEEDED
Status: DISCONTINUED | OUTPATIENT
Start: 2021-10-28 | End: 2021-10-28 | Stop reason: HOSPADM

## 2021-10-28 RX ORDER — PROPOFOL 10 MG/ML
INJECTION, EMULSION INTRAVENOUS
Status: DISCONTINUED | OUTPATIENT
Start: 2021-10-28 | End: 2021-10-28 | Stop reason: HOSPADM

## 2021-10-28 RX ADMIN — Medication 5 ML: at 06:00

## 2021-10-28 RX ADMIN — SUCRALFATE 1 G: 1 TABLET ORAL at 21:36

## 2021-10-28 RX ADMIN — POLYETHYLENE GLYCOL 3350 17 G: 17 POWDER, FOR SOLUTION ORAL at 21:37

## 2021-10-28 RX ADMIN — PROPOFOL 50 MCG/KG/MIN: 10 INJECTION, EMULSION INTRAVENOUS at 14:17

## 2021-10-28 RX ADMIN — LIDOCAINE HYDROCHLORIDE 100 MG: 20 INJECTION, SOLUTION EPIDURAL; INFILTRATION; INTRACAUDAL; PERINEURAL at 14:17

## 2021-10-28 RX ADMIN — COLCHICINE 0.6 MG: 0.6 TABLET, FILM COATED ORAL at 10:45

## 2021-10-28 RX ADMIN — CEFTRIAXONE 1 G: 1 INJECTION, POWDER, FOR SOLUTION INTRAMUSCULAR; INTRAVENOUS at 16:04

## 2021-10-28 RX ADMIN — ESTRADIOL 1 MG: 1 TABLET ORAL at 09:31

## 2021-10-28 RX ADMIN — PROPOFOL 150 MG: 10 INJECTION, EMULSION INTRAVENOUS at 14:17

## 2021-10-28 RX ADMIN — SUCRALFATE 1 G: 1 TABLET ORAL at 16:04

## 2021-10-28 RX ADMIN — Medication 10 ML: at 21:44

## 2021-10-28 RX ADMIN — MONTELUKAST 10 MG: 10 TABLET, FILM COATED ORAL at 09:31

## 2021-10-28 RX ADMIN — Medication 10 ML: at 05:24

## 2021-10-28 RX ADMIN — ASPIRIN 81 MG: 81 TABLET ORAL at 09:31

## 2021-10-28 RX ADMIN — SODIUM CHLORIDE 100 ML/HR: 9 INJECTION, SOLUTION INTRAVENOUS at 11:00

## 2021-10-28 RX ADMIN — LOSARTAN POTASSIUM 12.5 MG: 25 TABLET, FILM COATED ORAL at 10:45

## 2021-10-28 RX ADMIN — METOPROLOL SUCCINATE 12.5 MG: 25 TABLET, EXTENDED RELEASE ORAL at 10:45

## 2021-10-28 RX ADMIN — Medication 10 ML: at 21:43

## 2021-10-28 RX ADMIN — PANTOPRAZOLE SODIUM 40 MG: 40 TABLET, DELAYED RELEASE ORAL at 09:31

## 2021-10-28 RX ADMIN — SODIUM CHLORIDE, SODIUM LACTATE, POTASSIUM CHLORIDE, AND CALCIUM CHLORIDE: 600; 310; 30; 20 INJECTION, SOLUTION INTRAVENOUS at 14:12

## 2021-10-28 RX ADMIN — PANTOPRAZOLE SODIUM 40 MG: 40 TABLET, DELAYED RELEASE ORAL at 21:36

## 2021-10-28 NOTE — PROGRESS NOTES
[facility] Progress Note         Admit Date:  10/25/2021    Today's Date:  10/28/2021    CC: Abdominal pain    Subjective:     Patient reports improved but ongoing left sided abdominal pain and now some epigastric discomfort. Had N/V yesterday evening that occurred \"a while after eating. \"  She has continued to have BMs. She had two yesterday of good volume. Last BM was looser. No GI bleeding. She is NPO this morning for possible EGD. She had some dizziness this morning and primary team was reportedly checking orthostatic vitals. Currently dizziness improved.      Medications:   Current Facility-Administered Medications   Medication Dose Route Frequency    cefTRIAXone (ROCEPHIN) 1 g in 0.9% sodium chloride (MBP/ADV) 50 mL MBP  1 g IntraVENous Q24H    pantoprazole (PROTONIX) tablet 40 mg  40 mg Oral ACB/HS    [Held by provider] sucralfate (CARAFATE) tablet 1 g  1 g Oral AC&HS    traMADoL (ULTRAM) tablet 50 mg  50 mg Oral Q8H PRN    acetaminophen (TYLENOL) tablet 650 mg  650 mg Oral Q6H PRN    nitroglycerin (NITROBID) 2 % ointment 1 Inch  1 Inch Topical Q6H PRN    0.9% sodium chloride infusion  75 mL/hr IntraVENous CONTINUOUS    lidocaine (XYLOCAINE) 2 % viscous solution 15 mL  15 mL Mouth/Throat PRN    midazolam (VERSED) injection 0.5-2 mg  0.5-2 mg IntraVENous Multiple    fentaNYL citrate (PF) injection 25-50 mcg  25-50 mcg IntraVENous Multiple    colchicine tablet 0.6 mg  0.6 mg Oral DAILY    sodium chloride (NS) flush 5-10 mL  5-10 mL IntraVENous Q8H    sodium chloride (NS) flush 5-10 mL  5-10 mL IntraVENous PRN    aspirin delayed-release tablet 81 mg  81 mg Oral DAILY    estradioL (ESTRACE) tablet 1 mg  1 mg Oral DAILY    fluticasone propionate (FLONASE) 50 mcg/actuation nasal spray 2 Spray  2 Spray Both Nostrils DAILY    losartan (COZAAR) tablet 25 mg  25 mg Oral DAILY    metoprolol succinate (TOPROL-XL) XL tablet 25 mg  25 mg Oral DAILY    montelukast (SINGULAIR) tablet 10 mg  10 mg Oral DAILY    nabumetone (RELAFEN) tablet 750 mg (Patient Supplied)  750 mg Oral BID PRN    polyethylene glycol (MIRALAX) packet 17 g  17 g Oral DAILY    spironolactone (ALDACTONE) tablet 25 mg  25 mg Oral DAILY    sodium chloride (NS) flush 5-40 mL  5-40 mL IntraVENous Q8H    sodium chloride (NS) flush 5-40 mL  5-40 mL IntraVENous PRN    nitroglycerin (NITROSTAT) tablet 0.4 mg  0.4 mg SubLINGual Q5MIN PRN    ondansetron (ZOFRAN) injection 4 mg  4 mg IntraVENous Q4H PRN    influenza vaccine 2021-22 (6 mos+)(PF) (FLUARIX/FLULAVAL/FLUZONE QUAD) injection 0.5 mL  1 Each IntraMUSCular PRIOR TO DISCHARGE       Review of Systems:  ROS was obtained, with pertinent positives as listed above. No chest pain or SOB. Diet:  NPO    Objective:   Vitals:  Visit Vitals  /60 (BP 1 Location: Left upper arm, BP Patient Position: At rest)   Pulse 68   Temp 97.4 °F (36.3 °C)   Resp 18   Ht 5' 3\" (1.6 m)   Wt 97.3 kg (214 lb 8 oz)   SpO2 96%   BMI 38.00 kg/m²     Intake/Output:  No intake/output data recorded. 10/26 1901 - 10/28 0700  In: 200 [P.O.:380]  Out: 850 [Urine:850]  Exam:  General appearance: alert, cooperative, no distress  Lungs: clear to auscultation bilaterally anteriorly  Heart: regular rate and rhythm  Abdomen: soft, mild epigastric ttp. Mild left sided ttp, most pronounced in mid to LUQ.   Bowel sounds normal.   Extremities: extremities normal, atraumatic, no cyanosis or edema  Neuro:  alert and oriented    Data Review (Labs):    Recent Labs     10/28/21  0439 10/27/21  0600 10/26/21  0426 10/25/21  1119   WBC  --   --  7.2 7.7   HGB  --   --  11.1* 11.8   HCT  --   --  35.7* 38.1   PLT  --   --  253 274   MCV  --   --  89.3 91.8   * 135* 137 137   K 4.0 4.5 4.0 4.1    103 102 103   CO2 29 26 29 26   BUN 12 15 18 15   CREA 1.04* 1.00 1.14* 1.09*   CA 9.1 8.1* 9.7 9.4   MG  --   --   --  2.4    93 95 97   AP  --   --   --  82   AST  --   --   --  15   ALT  --   -- --  17   TBILI  --   --   --  0.5   ALB  --   --   --  3.1*   TP  --   --   --  7.2   LPSE  --   --   --  80        [facility] a/[facility] at [facility] 10/26/18 FINDINGS   CT lung base: No significant infiltrates or effusions. CT abdomen:   Again noted is the hemangioma in the dome of liver.  No other new lesions are seen within the liver, spleen, pancreas, adrenal glands.  Patient is post cholecystectomy. Kidneys demonstrates no masses or hydronephrosis.  No significant retroperitoneal adenopathy.  No mesenteric adenopathy. GI tract:  Postsurgical changes in the stomach from prior gastric bypass.  Small bowel loops are not significantly thickened or dilated.  The appendix is normal.  Small to moderate amount of stool seen the colon.  The colon is extremely tortuous.  There is some diverticulosis of the sigmoid colon without obvious diverticulitis. CT pelvis:  No pelvic masses or adenopathy is seen.  Patient status post hysterectomy.  Bladder is unremarkable.  Fat extends into the inguinal canal without herniated bowel. Musculoskeletal: Degenerative changes and facet disease is seen in the spine. IMPRESSION  1.  Sigmoid diverticulosis without evidence for diverticulitis. 2.  Small to moderate amount of stool in the colon     Small bowel series at [facility] 8/1/19  FINDINGS:    views demonstrate moderate colonic stool burden was prominent in the right:.  Postoperative changes gastric bypass in the left upper quadrant.  No evidence of bowel obstruction.  Prior cholecystectomy noted. Contrast was administered. Pretty Cheeks is a small contrast present in the duodenum at 15 minutes.  No discrete contrast within the excluded stomach identified on initial image.       The small bowel is normal in caliber.  Normal appearance of the jejunum and ileum without evidence of a discrete mucosal lesion.  Contrast entered the colon at 120 minutes representing normal transit time.   IMPRESSION:  1.  Moderate colonic stool burden suggests constipation. 2.  Otherwise normal small bowel follow-through. CT a/p w contrast 10/27/21 INDICATION: palpable, painful mass in RUQ. COMPARISON: None. FINDINGS:  Visualized thorax: Normal.  Liver: Normal in size and morphology. Peripherally enhancing hypoattenuating lesion in the hepatic dome favored to reflect a hemangioma. Gallbladder/biliary: Gallbladder surgically absent. No biliary dilatation. Pancreas: Normal.  Spleen: Normal.  Adrenals: Normal.  Kidneys: No focal lesion or hydronephrosis. Bladder: Normal.  Pelvic organs: Uterus is surgically absent. No adnexal mass. Gastrointestinal: Colonic diverticulosis without diverticulitis. Postsurgical changes of Nadir-en-Y gastric bypass surgery. Peritoneum/retroperitoneum: Normal.  Lymph nodes: Normal.  Vessels: Normal  Bones/Soft tissues: Normal.  IMPRESSION: No acute abnormality or visualized etiology for the painful, palpable mass in the right upper quadrant. Assessment:     Active Problems:    Chest pain (10/25/2021)      IBS (irritable bowel syndrome) (10/27/2021)      Cystitis (10/27/2021)      66 y.o. female with PMH AFib s/p recent Watchman placement on 10/19 (20 mm device), h/o NICM with improvement in EF to 55-60% (on 10/19 PREETHI), PHTN, HTN, LAKEISHA, dyslipidemia, minimal CAD by [facility] 2016, mild chronic renal insufficiency, obesity s/p gastric bypass 2006, GERD, IBS, seen in GI consult 10/27 at the request of Neisha Bean PA-C for abdominal pain. She was admitted 10/25/21 with chest pain, SOB, nausea, left mid to upper abdominal pain, near syncope and is now s/p cardiac workup with no cardiac source identified. Now with ongoing left mid abdominal pain, indigestion 10/27, N/V, epigastric discomfort, hx constipation but having BMs on MiraLax without affect to her pain. Pain is not affected by eating. Pain is especially worse with movement. On exam had +Carnett sign suggestive of abdominal muscle wall pain, though dose of Carafate reportedly helped. Was on Eliquis and this now held, remains on ASA 81mg, Colchicine for possible pericarditis/inflammation. Also, being seen by hospitalist for UTI. Labs reveal normal LFTs, Lipase, CRP. CT a/p 10/27 was unremarkable. Per [facility] note 6/2021- EGD 4/2009 was negative for Borges's and last Colonoscopy 12/2018, small bowel series 8/2019 were unremarkable except SB study noted moderate colonic stool burden. Notes Fhx abdominal aneurysm. Abdomen is soft, non-distended with diffuse audible bowel sounds and mild left mid abdominal ttp. Plan:     - Protonix 40mg to twice daily.  - NPO this morning for EGD later today to evaluate persistent left sided abd pain, epigastric discomfort, non cardiac chest pain, N/V.  - Continue MiraLax 17g daily for bowel regularity. Will follow.     Chucky Bellamy PA-C  [facility]     Patient is seen and examined in collaboration with Dr. Arnel Santamaria. Assessment and plan as per Dr. Arnel Santamaria.

## 2021-10-28 NOTE — PROGRESS NOTES
[facility] CARDIOLOGY PROGRESS NOTE    10/28/2021 8:11 AM    Admit Date: 10/25/2021        Subjective:   Stable overnight without angina, CHF, or palpitations. Vitals stable and controlled. No other complaints overnight. Tolerating meds well. Dizzy with standing to go to bathroom, BP intermittently marginal on multiple meds for NICM, EF normal by PREETHI and cath this admission. Objective:      Vitals:    10/27/21 2139 10/28/21 0118 10/28/21 0424 10/28/21 0425   BP: (!) 114/58 125/60  132/60   Pulse: 66 69  68   Resp: 18 18  18   Temp: 97.5 °F (36.4 °C) 97.7 °F (36.5 °C)  97.4 °F (36.3 °C)   SpO2: 98% 99%  96%   Weight:   214 lb 8 oz (97.3 kg)    Height:           Physical Exam:  Neck- supple, no JVD  CV- regular rate and rhythm no MRG  Lung- clear bilaterally  Abd- soft, LLQ tender, nondistended  Ext- no edema  Skin- warm and dry    Data Review:   Recent Labs     10/28/21  0439 10/27/21  0600 10/26/21  0426 10/26/21  0426 10/25/21  1119 10/25/21  1119   * 135*   < > 137   < > 137   K 4.0 4.5   < > 4.0   < > 4.1   MG  --   --   --   --   --  2.4   BUN 12 15   < > 18   < > 15   CREA 1.04* 1.00   < > 1.14*   < > 1.09*    93   < > 95   < > 97   WBC  --   --   --  7.2  --  7.7   HGB  --   --   --  11.1*  --  11.8   HCT  --   --   --  35.7*  --  38.1   PLT  --   --   --  253  --  274   CHOL  --   --   --  240*  --   --    TRIGL  --   --   --  108  --   --    HDL  --   --   --  63*  --   --     < > = values in this interval not displayed. Assessment and Plan:          Chest pain (10/25/2021) recent left atrial appendage occlusion with PREETHI showing device in good position with no pericardial effusion. Left heart catheterization yesterday showed mild nonobstructive disease with no impingement of her left circumflex from the watchman implantation. Started colchicine for possible focal pericarditis and no angina or chest discomfort or heart failure symptoms overnight. Continue to monitor.   GI evaluation proceeding. Recheck BMP, CBC, magnesium in the morning. IBS (irritable bowel syndrome) (10/27/2021) n.p.o. for possible endoscopy today. Per GI. Recheck labs in the morning. Cystitis (10/27/2021) UTI present, on Rocephin. Follow white count and temperature. Near syncope-EF normal, on multiple medications for prior nonischemic cardiomyopathy history of but marginal blood pressure lying in bed. Check orthostatics and decrease meds as needed if positive for postural orthostasis. Reassess in AM    A.  Sydni Castellanos MD  [facility] Cardiology  Pager [phone]

## 2021-10-28 NOTE — ANESTHESIA PREPROCEDURE EVALUATION
Relevant Problems   RESPIRATORY SYSTEM   (+) Shortness of breath      CARDIOVASCULAR   (+) Coronary artery disease involving native coronary artery of native heart without angina pectoris   (+) Hypertension   (+) Paroxysmal atrial fibrillation (HCC)   (+) Sick sinus syndrome (HCC)      GASTROINTESTINAL   (+) Esophageal reflux      ENDOCRINE   (+) Severe obesity (HCC)      PERSONAL HX & FAMILY HX OF CANCER   (+) Malignant melanoma of skin of upper limb, including shoulder (HCC)       Anesthetic History     PONV          Review of Systems / Medical History  Patient summary reviewed and pertinent labs reviewed    Pulmonary        Sleep apnea  Shortness of breath      Comments: pHTN   Neuro/Psych              Cardiovascular    Hypertension      CHF (previously Takotsubo's)  Dysrhythmias (SSS) : atrial fibrillation      Exercise tolerance: <4 METS  Comments: Severe PHTN  RVSP ~3/2 systolic. S/P Watchman on 10/19    Cath from 10/26:  · Mild nonobstructive coronary artery disease  · Normal LV systolic function. · Watchman device is in good position based on fluoroscopic assessment. No impingement on the circumflex system seen. · Mild nonobstructive coronary artery disease  · Normal LV systolic function. · Watchman device is in good position based on fluoroscopic assessment. No impingement on the circumflex system seen.    GI/Hepatic/Renal     GERD           Endo/Other        Morbid obesity and arthritis     Other Findings              Physical Exam    Airway  Mallampati: II  TM Distance: 4 - 6 cm         Cardiovascular    Rhythm: regular  Rate: normal      Pertinent negatives: No murmur   Dental  No notable dental hx       Pulmonary  Breath sounds clear to auscultation               Abdominal         Other Findings            Anesthetic Plan    ASA: 4  Anesthesia type: general    Monitoring Plan: Arterial line      Induction: Intravenous  Anesthetic plan and risks discussed with: Patient

## 2021-10-28 NOTE — ROUTINE PROCESS
TRANSFER - IN REPORT:    Verbal report received [facility] lab for routine progression of care      Report consisted of patients Situation, Background, Assessment and Recommendations(SBAR). Information from the following report(s) SBAR and Kardex was reviewed with the receiving nurse. Opportunity for questions and clarification was provided. Assessment completed upon patients arrival to unit and care assumed.

## 2021-10-28 NOTE — INTERVAL H&P NOTE
Anesthesia Evaluation     Patient summary reviewed and Nursing notes reviewed   NPO Solid Status: > 8 hours  NPO Liquid Status: > 2 hours           Airway   Mallampati: II  TM distance: >3 FB  Neck ROM: full  Dental - normal exam     Pulmonary - normal exam   (+) sleep apnea,   Cardiovascular - normal exam    (+) hyperlipidemia,       Neuro/Psych  (+) headaches, dizziness/light headedness,     GI/Hepatic/Renal/Endo    (+) obesity,  GERD,      Musculoskeletal (-) negative ROS    Abdominal    Substance History - negative use     OB/GYN negative ob/gyn ROS         Other                        Anesthesia Plan    ASA 3     MAC       Anesthetic plan, all risks, benefits, and alternatives have been provided, discussed and informed consent has been obtained with: patient.       Update History & Physical    The Patient's History and Physical of October 27, 2021 was reviewed with the patient and I examined the patient. There was no change. The surgical site was confirmed by the patient and me. Plan:  The risk, benefits, expected outcome, and alternative to the recommended procedure have been discussed with the patient. Patient understands and wants to proceed with the procedure.     Electronically signed by Reji Garcia MD on 10/28/2021 at 2:06 PM

## 2021-10-28 NOTE — ANESTHESIA POSTPROCEDURE EVALUATION
Procedure(s):  ESOPHAGOGASTRODUODENOSCOPY (EGD) ROOM 325  ESOPHAGOGASTRODUODENAL (EGD) BIOPSY. general    Anesthesia Post Evaluation      Multimodal analgesia: multimodal analgesia used between 6 hours prior to anesthesia start to PACU discharge  Patient location during evaluation: PACU  Patient participation: complete - patient participated  Level of consciousness: awake and alert  Pain management: adequate  Airway patency: patent  Anesthetic complications: no  Cardiovascular status: acceptable  Respiratory status: acceptable  Hydration status: acceptable  Post anesthesia nausea and vomiting:  none  Final Post Anesthesia Temperature Assessment:  Normothermia (36.0-37.5 degrees C)      INITIAL Post-op Vital signs:   Vitals Value Taken Time   BP 97/50 10/28/21 1432   Temp 36.7 °C (98 °F) 10/28/21 1432   Pulse 58 10/28/21 1434   Resp 13 10/28/21 1432   SpO2 95 % 10/28/21 1434   Vitals shown include unvalidated device data.

## 2021-10-28 NOTE — PROGRESS NOTES
[facility] Hospitalist Consult Note    Patient: Eber Menjivar Date: 10/28/2021  female, 66 y.o. Admit Date: 10/25/2021  Attending: Yordy Morley MD     ASSESSMENT AND PLAN:     # Abdominal pain in the setting of known c-IBS  - agree with GI consult  -CT abdomen and pelvis done yesterday negative for acute abnormality. EGD this a.m. shows hiatal hernia, gastritis, Nadir-en-Y gastric bypass anatomy. GI recommends Protonix 40 mg p.o. twice daily, Carafate 1 g p.o. before every meal, nightly for 2 months. H. pylori serology ordered. - bowel regimen     # Acute uncomplicated cystitis  - Rocephin empirically (day 2)  - follow urine culture-positive for gram-negative rods. # Chest pain/Afib  - per primary    Thank you for this consult. Hospitalist will follow along. Please page/call with questions or concerns. Discussed with patient and  at bedside. All questions answered. Disposition per primary cardiology    HISTORY OF PRESENT ILLNESS:      67 yo CF admitted to the cardiology service for chest discomfort, shortness of breath and near syncopal episode. She recently had Watchman device implanted for atrial fibrillation. She underwent uncomplicated LHC showing NOCAD. Hospitalist consulted for abdominal pain and concern for positive UA. Patient with known history of constipation-predominant IBS with flares in the past \"whenever I miss doses of Miralax. \" She has abdominal pain along LUQ (patient pointed) that is \"burning, I guess\" and does not radiate. Food intake and bowel movements do not improve these symptoms. She denies nausea or vomiting. She denies fevers/chills or shortness of breath. Last BM was earlier today. She does acknowledge more urinary frequency without burning. No back pain. Subjective  10/28 patient complains of pain in left upper quadrant. No fever no chills. No chest pain. No nausea no vomiting.     Patient Active Problem List   Diagnosis Code    Edema R60.9    Arthralgia of multiple joints M25.50    Herpes zoster B02.9    Malignant melanoma of skin of upper limb, including shoulder ([facility]) C43.60    Esophageal reflux K21.9    Vitamin D deficiency E55.9    Hypercholesteremia E78.00    Hypertension P85    Systolic CHF, chronic ([facility]) I50.22    Shortness of breath R06.02    Chronic fatigue R53.82    Coronary artery disease involving native coronary artery of native heart without angina pectoris I25.10    Paroxysmal atrial fibrillation ([facility]) I48.0    Sick sinus syndrome ([facility]) I49.5    Takotsubo cardiomyopathy I51.81    NICM (nonischemic cardiomyopathy) ([facility]) I42.8    Pulmonary HTN ([facility]) I27.20    Severe obesity ([facility]) E66.01    Snoring R06.83    Permanent atrial fibrillation ([facility]) I48.21    Chest pain R07.9    IBS (irritable bowel syndrome) K58.9    Cystitis N30.90       Allergy  Allergies   Allergen Reactions    Latex Rash    Other Food Unknown (comments)     Nuts brazil    Atorvastatin Unknown (comments)    Azo Pms [Black Cohosh-Chaste-Am. Ginseng] Itching    Codeine Itching    Hydrocodone Unknown (comments)    Nickel Rash    Oxycodone Unknown (comments)    Phenazopyridine Unknown (comments)     NOT ALLERGIC    Statins-Hmg-Coa Reductase Inhibitors Myalgia       Medication list  Prior to Admission Medications   Prescriptions Last Dose Informant Patient Reported? Taking? apixaban (ELIQUIS) 5 mg tablet 10/24/2021 at Unknown time  No Yes   Sig: Take 1 Tablet by mouth two (2) times a day. aspirin delayed-release 81 mg tablet 10/24/2021 at Unknown time  Yes Yes   Sig: Take 1 Tablet by mouth daily. estradiol (ESTRACE) 2 mg tablet 10/24/2021 at Unknown time  Yes Yes   Sig: Take 1 mg by mouth daily. 1/2 tablet daily   fluticasone (FLONASE) 50 mcg/actuation nasal spray Unknown at Unknown time  Yes No   Si Sprays by Both Nostrils route daily. furosemide (Lasix) 40 mg tablet 10/24/2021 at Unknown time  No Yes   Sig: Take 1 Tablet by mouth daily.    losartan (COZAAR) 25 mg tablet 10/24/2021 at Unknown time  No Yes   Sig: TAKE ONE TABLET BY MOUTH ONCE DAILY. metoprolol succinate (TOPROL-XL) 25 mg XL tablet 10/24/2021 at Unknown time  No Yes   Sig: Take 1 Tablet by mouth daily. 1/2 qd   montelukast (SINGULAIR) 10 mg tablet 10/24/2021 at Unknown time  Yes Yes   Sig: Take 10 mg by mouth daily. multivitamin (ONE A DAY) tablet 10/24/2021 at Unknown time  Yes Yes   Sig: Take 1 Tab by mouth daily. nabumetone (RELAFEN) 750 mg tablet 2021 at Unknown time  Yes Yes   Sig: Take 750 mg by mouth two (2) times a day. As needed   nitroglycerin (NITROSTAT) 0.4 mg SL tablet 10/25/2021 at Unknown time  No Yes   Si Tablet by SubLINGual route every five (5) minutes as needed for Chest Pain. omeprazole (PRILOSEC) 20 mg capsule 10/24/2021 at Unknown time  Yes Yes   Sig: Take 20 mg by mouth every other day. polyethylene glycol (Miralax) 17 gram/dose powder 10/24/2021 at Unknown time  Yes Yes   Sig: Take 17 g by mouth daily. potassium chloride SR (KLOR-CON 10) 10 mEq tablet 10/24/2021 at Unknown time  No Yes   Sig: Take 1 Tablet by mouth daily. spironolactone (ALDACTONE) 25 mg tablet 10/24/2021 at Unknown time  No Yes   Sig: Take 1 Tablet by mouth daily.       Facility-Administered Medications: None         Past Medical History  Past Medical History:   Diagnosis Date    Arrhythmia     Arthritis     CAD (coronary artery disease)     Cancer ([facility] [address]     Congestive heart failure (HCC)     Essential hypertension     GERD (gastroesophageal reflux disease)     Hyperlipidemia     Morbid obesity (HCC)     Nausea & vomiting     Sleep apnea        Past Surgical History:   Procedure Laterality Date    HX CHOLECYSTECTOMY      HX GASTRIC BYPASS      HX HYSTERECTOMY      HX ORTHOPAEDIC      right rotator cuff    HX ORTHOPAEDIC      bilateral hand surgeries    HX TONSILLECTOMY         Social History  Social History     Socioeconomic History    Marital status:  Spouse name: Not on file    Number of children: Not on file    Years of education: Not on file    Highest education level: Not on file   Tobacco Use    Smoking status: Never Smoker    Smokeless tobacco: Never Used   Substance and Sexual Activity    Alcohol use: No     Social Determinants of Health     Financial Resource Strain:     Difficulty of Paying Living Expenses:    Food Insecurity:     Worried About Running Out of Food in the Last Year:     [address] in the Last Year:    Transportation Needs:     Lack of Transportation (Medical):  Lack of Transportation (Non-Medical):    Physical Activity:     Days of Exercise per Week:     Minutes of Exercise per Session:    Stress:     Feeling of Stress :    Social Connections:     Frequency of Communication with Friends and Family:     Frequency of Social Gatherings with Friends and Family:     Attends Orthodoxy Services:     Active Member of Clubs or Organizations:     Attends Club or Organization Meetings:     Marital Status:        Family History:   Family History   Problem Relation Age of Onset    Coronary Artery Disease Father     Heart Attack Father     Stroke Father     Sudden Death Father        REVIEW OF SYSTEMS:   A 14 point review of systems was taken and pertinent positive as per HPI.     PHYSICAL EXAMINATION:  Vital 24 Hour Range Most Recent Value  Temperature Temp  Min: 97.4 °F (36.3 °C)  Max: 98 °F (36.7 °C) 97.4 °F (36.3 °C)    Pulse Pulse  Min: 52  Max: 69 (!) 52  Respiratory Resp  Min: 13  Max: 20 17  Blood Pressure BP  Min: 97/50  Max: 149/60 129/63  Pulse Oximetry SpO2  Min: 96 %  Max: 100 % 99 %  O2 No data recorded      Vital Most Recent Value First Value  Weight 96.6 kg (213 lb) Weight: 97.5 kg (215 lb)  Height 5' 3\" (160 cm) Height: 5' 3\" (160 cm)  BMI   N/A    Physical Exam:   General: No acute distress, speaking in full sentences, no use of accessory muscles   HEENT: Pupils equal and reactive to light and accommodation, oropharynx is clear   Neck: Supple, no lymphadenopathy, no JVD   Lungs: Clear to auscultation bilaterally. Nonlabored. No wheezing. Cardiovascular: Regular rate and rhythm with normal S1 and S2   Abdomen: soft, nondistended, palpable mass in LUQ region, BS present, +suprapubic tenderness, no CVA tenderness, no rebound tenderness  Extremities: No cyanosis clubbing or edema   Neuro: Nonfocal, A&O x3   Psych: Normal affect     Intake/Output last 3 shifts:  Date 10/27/21 0700 - 10/28/21 0659 10/28/21 0700 - 10/29/21 0659   Shift 9623-7357 5978-7786 24 Hour Total 5819-2226 4774-3298 24 Hour Total   INTAKE   P.O. 380 0 380        P. O. 380 0 380      I. V.(mL/kg/hr)    100  100     Volume (lactated Ringers infusion)    100  100   Shift Total(mL/kg) 380(3.9) 0(0) 380(3.9) 100(1)  100(1)   OUTPUT   Urine(mL/kg/hr)  0(0) 0(0) 0  0     Urine Voided  0 0        Urine Occurrence(s)  2 x 2 x        Urine Output    0  0   Emesis/NG output           Emesis Occurrence(s)  1 x 1 x      Stool           Stool Occurrence(s)  1 x 1 x      Blood    0  0     Estimated Blood Loss    0  0   Shift Total(mL/kg)  0(0) 0(0) 0(0)  0(0)    0 380 100  100   Weight (kg) 96.9 97.3 97.3 96.6 96.6 96.6       Labs:  magnesium:7,phos:7)CMP:   Lab Results   Component Value Date/Time     (L) 10/28/2021 04:39 AM    K 4.0 10/28/2021 04:39 AM     10/28/2021 04:39 AM    CO2 29 10/28/2021 04:39 AM    AGAP 5 (L) 10/28/2021 04:39 AM     10/28/2021 04:39 AM    BUN 12 10/28/2021 04:39 AM    CREA 1.04 (H) 10/28/2021 04:39 AM    GFRAA >60 10/28/2021 04:39 AM    GFRNA 55 (L) 10/28/2021 04:39 AM    CA 9.1 10/28/2021 04:39 AM         CBC:  No results found for: WBC, HGB, HCT, PLT, HGBEXT, HCTEXT, PLTEXT, HGBEXT, HCTEXT, PLTEXT    Lab Results   Component Value Date/Time    INR 1.2 10/18/2021 03:10 PM    INR 1.4 08/03/2021 08:15 AM    INR 1.0 09/30/2016 01:00 PM    Prothrombin time 15.2 (H) 10/18/2021 03:10 PM    Prothrombin time 17. 4 (H) 08/03/2021 08:15 AM    Prothrombin time 10.8 09/30/2016 01:00 PM       ABG:  No results found for: PH, PHI, PCO2, PCO2I, PO2, PO2I, HCO3, HCO3I, FIO2, FIO2I        Lab Results   Component Value Date/Time    Troponin-I, Qt. 0.12 (HH) 05/28/2017 01:55 AM     (H) 01/29/2019 12:43 PM     09/30/2016 01:00 PM       Imagining & Other Studies    XR Results (maximum last 3): Results from [place] encounter on 10/25/21    XR CHEST PA LAT    Narrative  PA AND LATERAL CHEST X-RAY. Clinical Indication: Chest pain for one week    Comparison: Chest x-ray dated 11/14/2018    Findings: 2 views of the chest submitted demonstrate the cardiac silhouette and  mediastinum to be unremarkable. There is no pleural effusion or pneumothorax. The lung parenchyma is clear. The included osseous structures are unremarkable. Impression  No acute cardiopulmonary abnormality. Results from [place] encounter on 11/14/18    XR CHEST PA LAT    Narrative  PA AND LATERAL CHEST X-RAY. Clinical Indication: Pulmonary hypertension    Comparison: Chest x-ray dated 11/7/2018    Findings: 2 views of the chest submitted demonstrate the cardiac silhouette and  mediastinum to be unremarkable. There is no pleural effusion or pneumothorax. The lung parenchyma is clear. The included osseous structures are unremarkable. Impression  Impression: No acute abnormality. Results from [place] encounter on 11/07/18    XR CHEST PA LAT    Narrative  TWO-VIEW CHEST:    CLINICAL HISTORY: Shortness of breath with dyspnea on exertion for 2 months. History of pulmonary hypertension. COMPARISON:  May 27, 2017. FINDINGS: PA and lateral chest images demonstrate no acute pneumonic infiltrate  or significant pleural fluid collection. The heart size is within normal limits  without evidence of congestive heart failure or pneumothorax.   The bony thorax  appears intact on these views with mild dextroconvex scoliosis and multilevel  spondylosis. .    Impression  IMPRESSION:  NO ACUTE CARDIOPULMONARY DISEASE IDENTIFIED. CT Results (maximum last 3): Results from [place] encounter on 10/25/21    CT ABD PELV W CONT    Narrative  CT OF THE ABDOMEN AND PELVIS    INDICATION: palpable, painful mass in RUQ. COMPARISON: None. TECHNIQUE: Multiple axial images were obtained through the abdomen and pelvis  after intravenous injection of 100 mL Isovue 370. Intravenous contrast was used  for better evaluation of solid organs and vascular structures. Oral contrast was  used for bowel opacification. Radiation dose reduction techniques were used for  this study. Our CT scanners use one or all of the following: Automated exposure  control, adjustment of the mA and/or kV according to patient size, iterative  reconstruction. FINDINGS:  Visualized thorax: Normal.    Liver: Normal in size and morphology. Peripherally enhancing hypoattenuating  lesion in the hepatic dome favored to reflect a hemangioma. Gallbladder/biliary: Gallbladder surgically absent. No biliary dilatation. Pancreas: Normal.    Spleen: Normal.    Adrenals: Normal.    Kidneys: No focal lesion or hydronephrosis. Bladder: Normal.    Pelvic organs: Uterus is surgically absent. No adnexal mass. Gastrointestinal: Colonic diverticulosis without diverticulitis. Postsurgical  changes of Nadir-en-Y gastric bypass surgery. Peritoneum/retroperitoneum: Normal.    Lymph nodes: Normal.    Vessels: Normal.    Bones/Soft tissues: Normal.    Impression  No acute abnormality or visualized etiology for the painful, palpable mass in  the right upper quadrant. MRI Results (maximum last 3): No results found for this or any previous visit. Nuclear Medicine Results (maximum last 3):   Results from [place] encounter on 11/14/18    NM LUNG PERFUSION W VENT    Narrative  Nuclear medicine VQ scan    CLINICAL INDICATION: Pulmonary hypertension    PROCEDURE: After appropriate patient preparation, the patient was administered  42.0 mCi of technetium labeled DTPA for five minutes via nebulizer for the  ventilation portion of the scan. The patient was then administered 6.1 mCi of  technetium labeled MAA particles for the perfusion portion of the scan. COMPARISON: Chest x-ray from the same day. FINDINGS: There is homogeneous distribution of tracer throughout the lung  parenchyma on the ventilation portion of the scan. Homogeneous distribution of  tracer is also appreciated throughout the lung parenchyma on the perfusion  portion of the exam. No photopenic defects appreciated. Impression  IMPRESSION: Normal VQ scan. No scintigraphic evidence for pulmonary embolus. US Results (maximum last 3): No results found for this or any previous visit. DEXA Results (maximum last 3): No results found for this or any previous visit. ERI Results (maximum last 3): No results found for this or any previous visit. IR Results (maximum last 3): No results found for this or any previous visit. VAS/US Results (maximum last 3): No results found for this or any previous visit. PET Results (maximum last 3): No results found for this or any previous visit.         EKG Results     Procedure 720 Value Units Date/Time    EKG [[id number]] Collected: 10/25/21 1112    Order Status: Completed Updated: 10/26/21 0700     Ventricular Rate 64 BPM      Atrial Rate 67 BPM      QRS Duration 108 ms      Q-T Interval 416 ms      QTC Calculation (Bezet) 429 ms      Calculated R Axis -29 degrees      Calculated T Axis 51 degrees      Diagnosis --     Atrial fibrillation  Incomplete left bundle branch block  Minimal voltage criteria for LVH, may be normal variant  Abnormal ECG  When compared with ECG of 19-OCT-2021 08:06,  T wave inversion no longer evident in Inferior leads  Nonspecific T wave abnormality, improved in Anterolateral leads  Confirmed by Ted Aguila ([id number]) on 10/26/2021 6:59:52 AM              Thank you Tyler Marroquin MD for allowing us to participate in the care of this interesting patient. We shall follow with you.     Maryann Carson MD  10/28/2021 12:37 PM

## 2021-10-28 NOTE — PROGRESS NOTES
TRANSFER - OUT REPORT:    Verbal report given to COURT Calderon (name) on Onofre Burris  being transferred to [facility](unit) for routine post - op       Report consisted of patients Situation, Background, Assessment and   Recommendations(SBAR). Information from the following report(s) SBAR and Procedure Summary was reviewed with the receiving nurse. Lines:   Peripheral IV 10/26/21 Right Antecubital (Active)   Site Assessment Clean, dry, & intact 10/28/21 1154   Phlebitis Assessment 0 10/28/21 1154   Infiltration Assessment 0 10/28/21 1154   Dressing Status Clean, dry, & intact 10/28/21 1154   Dressing Type Transparent 10/28/21 0410   Hub Color/Line Status Infusing 10/28/21 1154   Alcohol Cap Used No 10/26/21 1648        Opportunity for questions and clarification was provided.       Patient transported with:   [employer]

## 2021-10-28 NOTE — PROCEDURES
Esophagogastroduodenoscopy    DATE of PROCEDURE: 10/28/2021    INDICATION:  1. LUQ abdominal pain    POSTPROCEDURE DIAGNOSIS:  1. Hiatal hernia  2. Gastritis  3. Nadir-en-y gastric bypass anatomy    MEDICATIONS ADMINISTERED: MAC. Further details per anesthesia note. INSTRUMENT: [id number]    PROCEDURE:  After obtaining informed consent, the patient was placed in the left lateral position and sedated. The endoscope was advanced under direct vision without difficulty. The esophagus, stomach (including retroflexed views) and duodenum were evaluated. The patient was taken to the recovery area in stable condition. FINDINGS:  ESOPHAGUS: Normal  STOMACH: Small hiatal hernia. Nadir-en-y gastric bypass anatomy. Mild gastritis with single erosion in gastric pouch. Cold-forceps biopsies for pathology. Exam negative for anastomotic ulcer. Anastomosis is patent. Otherwise normal exam.  JEJUNUM: Nadir limb is normal.    Estimated blood loss: 0-minimal   Specimens obtained during procedure:   1. Gastric biopsies    PLAN:  1. Follow-up pathology  2. Protonix 40 mg PO BID  3. Carafate 1g PO QAC and QHS for 2 months (slurry)  4. Check H.pylori serology  5. Restart diet and advance as tolerated.    6. Titrate Miralax for daily Favian Lockett MD

## 2021-10-28 NOTE — ROUTINE PROCESS
TRANSFER - OUT REPORT:    Verbal report given to Olga Lidia Reese RN [facility] [address] [facility]  being [facility] to  for ordered procedure       Report consisted of patients Situation, Background, Assessment and Recommendations(SBAR). Information from the following report(s) SBAR and Kardex was reviewed with the receiving nurse. Opportunity for questions and clarification was provided.       Consent signed and placed in chart

## 2021-10-28 NOTE — PROGRESS NOTES
TRANSFER - IN REPORT:    [facility] [address]  being received from 325 for ordered procedure      Report consisted of patients Situation, Background, Assessment and   Recommendations(SBAR). Information from the following report(s) SBAR, Intake/Output, MAR, Recent Results, Med Rec Status and Pre Procedure Checklist was reviewed with the receiving nurse. Opportunity for questions and clarification was provided.

## 2021-10-28 NOTE — ROUTINE PROCESS
Bedside and Verbal shift change report given to [facility] (oncoming nurse). Report included the following information SBAR, Kardex, Procedure Summary, Intake/Output, MAR and Recent Results.

## 2021-10-29 VITALS
DIASTOLIC BLOOD PRESSURE: 64 MMHG | HEART RATE: 57 BPM | WEIGHT: 217.5 LBS | BODY MASS INDEX: 38.54 KG/M2 | OXYGEN SATURATION: 99 % | HEIGHT: 63 IN | TEMPERATURE: 97 F | SYSTOLIC BLOOD PRESSURE: 105 MMHG | RESPIRATION RATE: 20 BRPM

## 2021-10-29 LAB
ANION GAP SERPL CALC-SCNC: 2 MMOL/L (ref 7–16)
BACTERIA SPEC CULT: ABNORMAL
BACTERIA SPEC CULT: ABNORMAL
BUN SERPL-MCNC: 8 MG/DL (ref 8–23)
CALCIUM SERPL-MCNC: 8.7 MG/DL (ref 8.3–10.4)
CHLORIDE SERPL-SCNC: 103 MMOL/L (ref 98–107)
CO2 SERPL-SCNC: 29 MMOL/L (ref 21–32)
CREAT SERPL-MCNC: 0.92 MG/DL (ref 0.6–1)
ERYTHROCYTE [DISTWIDTH] IN BLOOD BY AUTOMATED COUNT: 13.9 % (ref 11.9–14.6)
GLUCOSE SERPL-MCNC: 95 MG/DL (ref 65–100)
HCT VFR BLD AUTO: 32.2 % (ref 35.8–46.3)
HGB BLD-MCNC: 10.2 G/DL (ref 11.7–15.4)
MAGNESIUM SERPL-MCNC: 2.2 MG/DL (ref 1.8–2.4)
MCH RBC QN AUTO: 28.5 PG (ref 26.1–32.9)
MCHC RBC AUTO-ENTMCNC: 31.7 G/DL (ref 31.4–35)
MCV RBC AUTO: 89.9 FL (ref 79.6–97.8)
NRBC # BLD: 0 K/UL (ref 0–0.2)
PLATELET # BLD AUTO: 241 K/UL (ref 150–450)
PMV BLD AUTO: 9.2 FL (ref 9.4–12.3)
POTASSIUM SERPL-SCNC: 4 MMOL/L (ref 3.5–5.1)
RBC # BLD AUTO: 3.58 M/UL (ref 4.05–5.2)
SERVICE CMNT-IMP: ABNORMAL
SODIUM SERPL-SCNC: 134 MMOL/L (ref 136–145)
WBC # BLD AUTO: 5.2 K/UL (ref 4.3–11.1)

## 2021-10-29 PROCEDURE — 74011250636 HC RX REV CODE- 250/636: Performed by: INTERNAL MEDICINE

## 2021-10-29 PROCEDURE — 80048 BASIC METABOLIC PNL TOTAL CA: CPT

## 2021-10-29 PROCEDURE — 74011250637 HC RX REV CODE- 250/637: Performed by: INTERNAL MEDICINE

## 2021-10-29 PROCEDURE — 36415 COLL VENOUS BLD VENIPUNCTURE: CPT

## 2021-10-29 PROCEDURE — 83735 ASSAY OF MAGNESIUM: CPT

## 2021-10-29 PROCEDURE — 85027 COMPLETE CBC AUTOMATED: CPT

## 2021-10-29 PROCEDURE — 74011250637 HC RX REV CODE- 250/637: Performed by: PHYSICIAN ASSISTANT

## 2021-10-29 PROCEDURE — 90686 IIV4 VACC NO PRSV 0.5 ML IM: CPT | Performed by: INTERNAL MEDICINE

## 2021-10-29 PROCEDURE — 90471 IMMUNIZATION ADMIN: CPT

## 2021-10-29 PROCEDURE — 99238 HOSP IP/OBS DSCHRG MGMT 30/<: CPT | Performed by: INTERNAL MEDICINE

## 2021-10-29 PROCEDURE — 2709999900 HC NON-CHARGEABLE SUPPLY

## 2021-10-29 PROCEDURE — 74011250637 HC RX REV CODE- 250/637: Performed by: NURSE PRACTITIONER

## 2021-10-29 RX ORDER — LOSARTAN POTASSIUM 25 MG/1
12.5 TABLET ORAL DAILY
Qty: 90 TABLET | Refills: 3 | Status: SHIPPED | OUTPATIENT
Start: 2021-10-29 | End: 2021-12-15

## 2021-10-29 RX ORDER — PANTOPRAZOLE SODIUM 40 MG/1
40 TABLET, DELAYED RELEASE ORAL
Qty: 60 TABLET | Refills: 1 | Status: SHIPPED | OUTPATIENT
Start: 2021-10-29 | End: 2021-10-29 | Stop reason: SDUPTHER

## 2021-10-29 RX ORDER — CEPHALEXIN 500 MG/1
500 CAPSULE ORAL 4 TIMES DAILY
Qty: 16 CAPSULE | Refills: 0 | Status: SHIPPED | OUTPATIENT
Start: 2021-10-29 | End: 2021-11-02

## 2021-10-29 RX ORDER — COLCHICINE 0.6 MG/1
0.6 TABLET ORAL DAILY
Qty: 30 TABLET | Refills: 0 | Status: SHIPPED | OUTPATIENT
Start: 2021-10-30 | End: 2021-12-15

## 2021-10-29 RX ORDER — COLCHICINE 0.6 MG/1
0.6 TABLET ORAL DAILY
Qty: 30 TABLET | Refills: 0 | Status: SHIPPED | OUTPATIENT
Start: 2021-10-30 | End: 2021-10-29

## 2021-10-29 RX ORDER — PANTOPRAZOLE SODIUM 40 MG/1
40 TABLET, DELAYED RELEASE ORAL DAILY
Qty: 30 TABLET | Refills: 1 | Status: SHIPPED | OUTPATIENT
Start: 2021-10-29 | End: 2021-12-15

## 2021-10-29 RX ORDER — METOPROLOL SUCCINATE 25 MG/1
12.5 TABLET, EXTENDED RELEASE ORAL DAILY
Qty: 90 TABLET | Refills: 3 | Status: SHIPPED | OUTPATIENT
Start: 2021-10-29

## 2021-10-29 RX ORDER — SAME BUTANEDISULFONATE/BETAINE 400-600 MG
250 POWDER IN PACKET (EA) ORAL 2 TIMES DAILY
Qty: 10 CAPSULE | Refills: 0 | Status: SHIPPED | OUTPATIENT
Start: 2021-10-29 | End: 2021-11-03

## 2021-10-29 RX ORDER — SUCRALFATE 1 G/10ML
1 SUSPENSION ORAL 4 TIMES DAILY
Qty: 1200 ML | Refills: 1 | Status: SHIPPED | OUTPATIENT
Start: 2021-10-29 | End: 2021-12-15 | Stop reason: ALTCHOICE

## 2021-10-29 RX ORDER — FLECAINIDE ACETATE 50 MG/1
100 TABLET ORAL EVERY 12 HOURS
Status: CANCELLED | OUTPATIENT
Start: 2021-10-29

## 2021-10-29 RX ORDER — PANTOPRAZOLE SODIUM 40 MG/1
40 TABLET, DELAYED RELEASE ORAL
Status: DISCONTINUED | OUTPATIENT
Start: 2021-10-30 | End: 2021-10-29 | Stop reason: HOSPADM

## 2021-10-29 RX ORDER — SUCRALFATE 1 G/1
1 TABLET ORAL
Qty: 120 TABLET | Refills: 1 | Status: SHIPPED | OUTPATIENT
Start: 2021-10-29 | End: 2021-10-29 | Stop reason: SDUPTHER

## 2021-10-29 RX ADMIN — LOSARTAN POTASSIUM 12.5 MG: 25 TABLET, FILM COATED ORAL at 09:04

## 2021-10-29 RX ADMIN — Medication 10 ML: at 05:25

## 2021-10-29 RX ADMIN — PANTOPRAZOLE SODIUM 40 MG: 40 TABLET, DELAYED RELEASE ORAL at 09:03

## 2021-10-29 RX ADMIN — MONTELUKAST 10 MG: 10 TABLET, FILM COATED ORAL at 09:03

## 2021-10-29 RX ADMIN — Medication 10 ML: at 05:24

## 2021-10-29 RX ADMIN — ESTRADIOL 1 MG: 1 TABLET ORAL at 09:04

## 2021-10-29 RX ADMIN — SUCRALFATE 1 G: 1 TABLET ORAL at 09:03

## 2021-10-29 RX ADMIN — INFLUENZA VIRUS VACCINE 0.5 ML: 15; 15; 15; 15 SUSPENSION INTRAMUSCULAR at 11:47

## 2021-10-29 RX ADMIN — ACETAMINOPHEN 650 MG: 325 TABLET ORAL at 10:11

## 2021-10-29 RX ADMIN — COLCHICINE 0.6 MG: 0.6 TABLET, FILM COATED ORAL at 09:05

## 2021-10-29 RX ADMIN — METOPROLOL SUCCINATE 12.5 MG: 25 TABLET, EXTENDED RELEASE ORAL at 09:03

## 2021-10-29 RX ADMIN — ASPIRIN 81 MG: 81 TABLET ORAL at 09:03

## 2021-10-29 RX ADMIN — SODIUM CHLORIDE 75 ML/HR: 900 INJECTION, SOLUTION INTRAVENOUS at 05:19

## 2021-10-29 NOTE — ROUTINE PROCESS
Bedside and Verbal shift change report given to myself (oncoming nurse) by Gali Montero (offgoing nurse). Report included the following information SBAR, Kardex, Intake/Output, MAR, Recent Results and Med Rec Status.

## 2021-10-29 NOTE — ROUTINE PROCESS
Bedside and Verbal shift change report given to Tae Sheehan RN (oncoming nurse).  Report included the following information SBAR, Kardex, Procedure Summary, Intake/Output, MAR and Recent Results.

## 2021-10-29 NOTE — PROGRESS NOTES
am  10/29/2021 7:06 AM    Admit Date: 10/25/2021    Admit Diagnosis: Chest pain [R07.9]      Subjective:    Patient : Pt was admitted for endoscopy and has chronic afib. She had chest pain and SOB which is now resolved. She is still having abdominal pain. Usually takes Eliquis for afib but held by GI. Objective:      Visit Vitals  BP (!) 109/55 (BP 1 Location: Left upper arm, BP Patient Position: At rest) Comment: RN notified   Pulse 64   Temp 97.6 °F (36.4 °C)   Resp 20   Ht 5' 3\" (1.6 m)   Wt 217 lb 8 oz (98.7 kg)   SpO2 96%   BMI 38.53 kg/m²       ROS:  General ROS: negative for - chills  Hematological and Lymphatic ROS: negative for - blood clots or jaundice  Respiratory ROS: no cough, shortness of breath, or wheezing  Cardiovascular ROS: no chest pain or dyspnea on exertion  Gastrointestinal ROS: positive for - abdominal pain  Neurological ROS: no TIA or stroke symptoms    Physical Exam:      Physical Examination: General appearance - Appearance: alert, well appearing, and in no distress.    Neck/lymph - supple, no significant adenopathy  Chest/CV - clear to auscultation, no wheezes, rales or rhonchi, symmetric air entry  Heart - irregularly irregular rhythm  Abdomen/GI - soft, nontender, nondistended, no masses or organomegaly   Musculoskeletal - no joint tenderness, deformity or swelling  Extremities - peripheral pulses normal, no pedal edema, no clubbing or cyanosis  Skin - normal coloration and turgor, no rashes, no suspicious skin lesions noted    Current Facility-Administered Medications   Medication Dose Route Frequency    metoprolol succinate (TOPROL-XL) XL tablet 12.5 mg  12.5 mg Oral DAILY    losartan (COZAAR) tablet 12.5 mg  12.5 mg Oral DAILY    cefTRIAXone (ROCEPHIN) 1 g in 0.9% sodium chloride (MBP/ADV) 50 mL MBP  1 g IntraVENous Q24H    pantoprazole (PROTONIX) tablet 40 mg  40 mg Oral ACB/HS    sucralfate (CARAFATE) tablet 1 g  1 g Oral AC&HS    traMADoL (ULTRAM) tablet 50 mg  50 mg Oral Q8H PRN    acetaminophen (TYLENOL) tablet 650 mg  650 mg Oral Q6H PRN    nitroglycerin (NITROBID) 2 % ointment 1 Inch  1 Inch Topical Q6H PRN    0.9% sodium chloride infusion  75 mL/hr IntraVENous CONTINUOUS    lidocaine (XYLOCAINE) 2 % viscous solution 15 mL  15 mL Mouth/Throat PRN    midazolam (VERSED) injection 0.5-2 mg  0.5-2 mg IntraVENous Multiple    fentaNYL citrate (PF) injection 25-50 mcg  25-50 mcg IntraVENous Multiple    colchicine tablet 0.6 mg  0.6 mg Oral DAILY    sodium chloride (NS) flush 5-10 mL  5-10 mL IntraVENous Q8H    sodium chloride (NS) flush 5-10 mL  5-10 mL IntraVENous PRN    aspirin delayed-release tablet 81 mg  81 mg Oral DAILY    estradioL (ESTRACE) tablet 1 mg  1 mg Oral DAILY    fluticasone propionate (FLONASE) 50 mcg/actuation nasal spray 2 Spray  2 Spray Both Nostrils DAILY    montelukast (SINGULAIR) tablet 10 mg  10 mg Oral DAILY    nabumetone (RELAFEN) tablet 750 mg (Patient Supplied)  750 mg Oral BID PRN    polyethylene glycol (MIRALAX) packet 17 g  17 g Oral DAILY    sodium chloride (NS) flush 5-40 mL  5-40 mL IntraVENous Q8H    sodium chloride (NS) flush 5-40 mL  5-40 mL IntraVENous PRN    nitroglycerin (NITROSTAT) tablet 0.4 mg  0.4 mg SubLINGual Q5MIN PRN    ondansetron (ZOFRAN) injection 4 mg  4 mg IntraVENous Q4H PRN    influenza vaccine 2021-22 (6 mos+)(PF) (FLUARIX/FLULAVAL/FLUZONE QUAD) injection 0.5 mL  1 Each IntraMUSCular PRIOR TO DISCHARGE       Data Review: data included in this note has been independently reviewed by the author   All lab results for the last 24 hours reviewed.      TELEMETRY: Afib    Assessment/Plan:     Active Problems:    Chest pain (10/25/2021)    resolved      IBS (irritable bowel syndrome) (10/27/2021)        Cystitis (10/27/2021)        Atrial fibrillation  - Currently takes toprol-xl 12.5mg   - Eliquis has been held by GI    Rate control for A. fib patient has watchman therefore long-term anticoagulation will not be necessary            Redge Lilliana

## 2021-10-29 NOTE — PROGRESS NOTES
[facility]ist Progress Note    Patient: Carmen Moreno Date: 10/29/2021  female, 66 y.o. Admit Date: 10/25/2021  Attending: No att. providers found     ASSESSMENT AND PLAN:     This is a 74y female with:     #Gastritis/hiatal hernia- abdominal pain in the setting of known c-IBS  -GI consulted. Appreciate recommendations. -CT abdomen and pelvis negative for acute abnormality. EGD 10/28 shows hiatal hernia, gastritis, Nadir-en-Y gastric bypass anatomy. GI recommends Protonix 40 mg p.o. twice daily, Carafate 1 g p.o. before every meal, nightly for 2 months. H. pylori serology ordered. - bowel regimen     # Acute uncomplicated cystitis  -Klebsiella UTI. Patient received IV ceftriaxone while inpatient. Switched to Keflex on discharge to complete course of antibiotics. # Chest pain/Afib  Chest pain resolved. - per primary cardiology. Eliquis, metoprolol. Disposition: Home today per primary cardiology    HISTORY OF PRESENT ILLNESS:      65 yo CF admitted to the cardiology service for chest discomfort, shortness of breath and near syncopal episode. She recently had Watchman device implanted for atrial fibrillation. She underwent uncomplicated LHC showing NOCAD. Hospitalist consulted for abdominal pain and concern for positive UA. Patient with known history of constipation-predominant IBS with flares in the past \"whenever I miss doses of Miralax. \" She has abdominal pain along LUQ (patient pointed) that is \"burning, I guess\" and does not radiate. Food intake and bowel movements do not improve these symptoms. She denies nausea or vomiting. She denies fevers/chills or shortness of breath. Last BM was earlier today. She does acknowledge more urinary frequency without burning. No back pain. Subjective  10/29 patient is doing well this morning. Abdominal pain resolved. No fever no chills. No chest pain nausea or vomiting.     Patient Active Problem List   Diagnosis Code    Edema R60.9    Arthralgia of multiple joints M25.50    Herpes zoster B02.9    Malignant melanoma of skin of upper limb, including shoulder ([facility]) C43.60    Esophageal reflux K21.9    Vitamin D deficiency E55.9    Hypercholesteremia E78.00    Hypertension R45    Systolic CHF, chronic ([facility]) I50.22    Shortness of breath R06.02    Chronic fatigue R53.82    Coronary artery disease involving native coronary artery of native heart without angina pectoris I25.10    Paroxysmal atrial fibrillation ([facility]) I48.0    Sick sinus syndrome ([facility]) I49.5    Takotsubo cardiomyopathy I51.81    NICM (nonischemic cardiomyopathy) ([facility]) I42.8    Pulmonary HTN ([facility]) I27.20    Severe obesity ([facility]) E66.01    Snoring R06.83    Permanent atrial fibrillation ([facility]) I48.21    Chest pain R07.9    IBS (irritable bowel syndrome) K58.9    Cystitis N30.90       Allergy  Allergies   Allergen Reactions    Latex Rash    Other Food Unknown (comments)     Nuts brazil    Atorvastatin Unknown (comments)    Azo Pms [Black Cohosh-Chaste-Am. Ginseng] Itching    Codeine Itching    Hydrocodone Unknown (comments)    Nickel Rash    Oxycodone Unknown (comments)    Phenazopyridine Unknown (comments)     NOT ALLERGIC    Statins-Hmg-Coa Reductase Inhibitors Myalgia       Medication list  Prior to Admission Medications   Prescriptions Last Dose Informant Patient Reported? Taking? apixaban (ELIQUIS) 5 mg tablet 10/24/2021 at Unknown time  No Yes   Sig: Take 1 Tablet by mouth two (2) times a day. aspirin delayed-release 81 mg tablet 10/24/2021 at Unknown time  Yes Yes   Sig: Take 1 Tablet by mouth daily. estradiol (ESTRACE) 2 mg tablet 10/24/2021 at Unknown time  Yes Yes   Sig: Take 1 mg by mouth daily. 1/2 tablet daily   fluticasone (FLONASE) 50 mcg/actuation nasal spray Unknown at Unknown time  Yes No   Si Sprays by Both Nostrils route daily. furosemide (Lasix) 40 mg tablet 10/24/2021 at Unknown time  No Yes   Sig: Take 1 Tablet by mouth daily.    losartan (COZAAR) 25 mg tablet 10/24/2021 at Unknown time  No Yes   Sig: TAKE ONE TABLET BY MOUTH ONCE DAILY. metoprolol succinate (TOPROL-XL) 25 mg XL tablet 10/24/2021 at Unknown time  No Yes   Sig: Take 1 Tablet by mouth daily. 1/2 qd   montelukast (SINGULAIR) 10 mg tablet 10/24/2021 at Unknown time  Yes Yes   Sig: Take 10 mg by mouth daily. multivitamin (ONE A DAY) tablet 10/24/2021 at Unknown time  Yes Yes   Sig: Take 1 Tab by mouth daily. nabumetone (RELAFEN) 750 mg tablet 2021 at Unknown time  Yes Yes   Sig: Take 750 mg by mouth two (2) times a day. As needed   nitroglycerin (NITROSTAT) 0.4 mg SL tablet 10/25/2021 at Unknown time  No Yes   Si Tablet by SubLINGual route every five (5) minutes as needed for Chest Pain. omeprazole (PRILOSEC) 20 mg capsule 10/24/2021 at Unknown time  Yes Yes   Sig: Take 20 mg by mouth every other day. polyethylene glycol (Miralax) 17 gram/dose powder 10/24/2021 at Unknown time  Yes Yes   Sig: Take 17 g by mouth daily. potassium chloride SR (KLOR-CON 10) 10 mEq tablet 10/24/2021 at Unknown time  No Yes   Sig: Take 1 Tablet by mouth daily. spironolactone (ALDACTONE) 25 mg tablet 10/24/2021 at Unknown time  No Yes   Sig: Take 1 Tablet by mouth daily.       Facility-Administered Medications: None         Past Medical History  Past Medical History:   Diagnosis Date    Arrhythmia     Arthritis     CAD (coronary artery disease)     Cancer ([facility] [address]     Congestive heart failure (HCC)     Essential hypertension     GERD (gastroesophageal reflux disease)     Hyperlipidemia     Morbid obesity (HCC)     Nausea & vomiting     Sleep apnea        Past Surgical History:   Procedure Laterality Date    HX CHOLECYSTECTOMY      HX GASTRIC BYPASS      HX HYSTERECTOMY      HX ORTHOPAEDIC      right rotator cuff    HX ORTHOPAEDIC      bilateral hand surgeries    HX TONSILLECTOMY         Social History  Social History     Socioeconomic History    Marital status:  Spouse name: Not on file    Number of children: Not on file    Years of education: Not on file    Highest education level: Not on file   Tobacco Use    Smoking status: Never Smoker    Smokeless tobacco: Never Used   Substance and Sexual Activity    Alcohol use: No     Social Determinants of Health     Financial Resource Strain:     Difficulty of Paying Living Expenses:    Food Insecurity:     Worried About Running Out of Food in the Last Year:     [address] in the Last Year:    Transportation Needs:     Lack of Transportation (Medical):  Lack of Transportation (Non-Medical):    Physical Activity:     Days of Exercise per Week:     Minutes of Exercise per Session:    Stress:     Feeling of Stress :    Social Connections:     Frequency of Communication with Friends and Family:     Frequency of Social Gatherings with Friends and Family:     Attends Jew Services:     Active Member of Clubs or Organizations:     Attends Club or Organization Meetings:     Marital Status:        Family History:   Family History   Problem Relation Age of Onset    Coronary Artery Disease Father     Heart Attack Father     Stroke Father     Sudden Death Father        REVIEW OF SYSTEMS:   A 14 point review of systems was taken and pertinent positive as per HPI.     PHYSICAL EXAMINATION:  Vital 24 Hour Range Most Recent Value  Temperature Temp  Min: 97 °F (36.1 °C)  Max: 98 °F (36.7 °C) 97 °F (36.1 °C)    Pulse Pulse  Min: 52  Max: 72 (!) 57  Respiratory Resp  Min: 13  Max: 20 20  Blood Pressure BP  Min: 97/50  Max: 149/60 105/64  Pulse Oximetry SpO2  Min: 93 %  Max: 100 % 99 %  O2 No data recorded      Vital Most Recent Value First Value  Weight 98.7 kg (217 lb 8 oz) Weight: 97.5 kg (215 lb)  Height 5' 3\" (160 cm) Height: 5' 3\" (160 cm)  BMI   N/A    Physical Exam:   General: No acute distress, speaking in full sentences, no use of accessory muscles   HEENT: Pupils equal and reactive to light and accommodation, oropharynx is clear   Neck: Supple, no lymphadenopathy, no JVD   Lungs: Clear to auscultation bilaterally. Nonlabored. No wheezing. Cardiovascular: Regular rate and rhythm with normal S1 and S2   Abdomen: soft, nondistended, palpable mass in LUQ region, BS present, +suprapubic tenderness, no CVA tenderness, no rebound tenderness  Extremities: No cyanosis clubbing or edema   Neuro: Nonfocal, A&O x3   Psych: Normal affect     Intake/Output last 3 shifts:  Date 10/28/21 0700 - 10/29/21 0659 10/29/21 0700 - 10/30/21 0659(Discharged)   Shift 0700-1859 1900-0659 24 Hour Total 0700-1859 1900-0659 24 Hour Total   INTAKE   P.O.  120 120 60  60     P. O.  120 120 60  60   I. V.(mL/kg/hr) 100(0.1)  100(0)        Volume (lactated Ringers infusion) 100  100      Shift Total(mL/kg) 100(1) 120(1.2) 220(2.2) 60(0.6)  60(0.6)   OUTPUT   Urine(mL/kg/hr) 0(0) 0(0) 0(0)        Urine Voided  0 0        Urine Output 0  0      Blood 0  0        Estimated Blood Loss 0  0      Shift Total(mL/kg) 0(0) 0(0) 0(0)       120 220 60  60   Weight (kg) 96.6 98.7 98.7 98.7 98.7 98.7       Labs:  magnesium:7,phos:7)CMP:   Lab Results   Component Value Date/Time     (L) 10/29/2021 05:31 AM    K 4.0 10/29/2021 05:31 AM     10/29/2021 05:31 AM    CO2 29 10/29/2021 05:31 AM    AGAP 2 (L) 10/29/2021 05:31 AM    GLU 95 10/29/2021 05:31 AM    BUN 8 10/29/2021 05:31 AM    CREA 0.92 10/29/2021 05:31 AM    GFRAA >60 10/29/2021 05:31 AM    GFRNA >60 10/29/2021 05:31 AM    CA 8.7 10/29/2021 05:31 AM    MG 2.2 10/29/2021 05:31 AM         CBC:    Lab Results   Component Value Date/Time    WBC 5.2 10/29/2021 05:31 AM    HGB 10.2 (L) 10/29/2021 05:31 AM    HCT 32.2 (L) 10/29/2021 05:31 AM     10/29/2021 05:31 AM       Lab Results   Component Value Date/Time    INR 1.2 10/18/2021 03:10 PM    INR 1.4 08/03/2021 08:15 AM    INR 1.0 09/30/2016 01:00 PM    Prothrombin time 15.2 (H) 10/18/2021 03:10 PM    Prothrombin time 17.4 (H) 08/03/2021 08:15 AM    Prothrombin time 10.8 09/30/2016 01:00 PM       ABG:  No results found for: PH, PHI, PCO2, PCO2I, PO2, PO2I, HCO3, HCO3I, FIO2, FIO2I        Lab Results   Component Value Date/Time    Troponin-I, Qt. 0.12 (HH) 05/28/2017 01:55 AM     (H) 01/29/2019 12:43 PM     09/30/2016 01:00 PM       Imagining & Other Studies    XR Results (maximum last 3): Results from [place] encounter on 10/25/21    XR CHEST PA LAT    Narrative  PA AND LATERAL CHEST X-RAY. Clinical Indication: Chest pain for one week    Comparison: Chest x-ray dated 11/14/2018    Findings: 2 views of the chest submitted demonstrate the cardiac silhouette and  mediastinum to be unremarkable. There is no pleural effusion or pneumothorax. The lung parenchyma is clear. The included osseous structures are unremarkable. Impression  No acute cardiopulmonary abnormality. Results from [place] encounter on 11/14/18    XR CHEST PA LAT    Narrative  PA AND LATERAL CHEST X-RAY. Clinical Indication: Pulmonary hypertension    Comparison: Chest x-ray dated 11/7/2018    Findings: 2 views of the chest submitted demonstrate the cardiac silhouette and  mediastinum to be unremarkable. There is no pleural effusion or pneumothorax. The lung parenchyma is clear. The included osseous structures are unremarkable. Impression  Impression: No acute abnormality. Results from [place] encounter on 11/07/18    XR CHEST PA LAT    Narrative  TWO-VIEW CHEST:    CLINICAL HISTORY: Shortness of breath with dyspnea on exertion for 2 months. History of pulmonary hypertension. COMPARISON:  May 27, 2017. FINDINGS: PA and lateral chest images demonstrate no acute pneumonic infiltrate  or significant pleural fluid collection. The heart size is within normal limits  without evidence of congestive heart failure or pneumothorax.   The bony thorax  appears intact on these views with mild dextroconvex scoliosis and multilevel  spondylosis. .    Impression  IMPRESSION:  NO ACUTE CARDIOPULMONARY DISEASE IDENTIFIED. CT Results (maximum last 3): Results from [place] encounter on 10/25/21    CT ABD PELV W CONT    Narrative  CT OF THE ABDOMEN AND PELVIS    INDICATION: palpable, painful mass in RUQ. COMPARISON: None. TECHNIQUE: Multiple axial images were obtained through the abdomen and pelvis  after intravenous injection of 100 mL Isovue 370. Intravenous contrast was used  for better evaluation of solid organs and vascular structures. Oral contrast was  used for bowel opacification. Radiation dose reduction techniques were used for  this study. Our CT scanners use one or all of the following: Automated exposure  control, adjustment of the mA and/or kV according to patient size, iterative  reconstruction. FINDINGS:  Visualized thorax: Normal.    Liver: Normal in size and morphology. Peripherally enhancing hypoattenuating  lesion in the hepatic dome favored to reflect a hemangioma. Gallbladder/biliary: Gallbladder surgically absent. No biliary dilatation. Pancreas: Normal.    Spleen: Normal.    Adrenals: Normal.    Kidneys: No focal lesion or hydronephrosis. Bladder: Normal.    Pelvic organs: Uterus is surgically absent. No adnexal mass. Gastrointestinal: Colonic diverticulosis without diverticulitis. Postsurgical  changes of Nadir-en-Y gastric bypass surgery. Peritoneum/retroperitoneum: Normal.    Lymph nodes: Normal.    Vessels: Normal.    Bones/Soft tissues: Normal.    Impression  No acute abnormality or visualized etiology for the painful, palpable mass in  the right upper quadrant. MRI Results (maximum last 3): No results found for this or any previous visit. Nuclear Medicine Results (maximum last 3):   Results from [place] encounter on 11/14/18    NM LUNG PERFUSION W VENT    Narrative  Nuclear medicine VQ scan    CLINICAL INDICATION: Pulmonary hypertension    PROCEDURE: After appropriate patient preparation, the patient was administered  42.0 mCi of technetium labeled DTPA for five minutes via nebulizer for the  ventilation portion of the scan. The patient was then administered 6.1 mCi of  technetium labeled MAA particles for the perfusion portion of the scan. COMPARISON: Chest x-ray from the same day. FINDINGS: There is homogeneous distribution of tracer throughout the lung  parenchyma on the ventilation portion of the scan. Homogeneous distribution of  tracer is also appreciated throughout the lung parenchyma on the perfusion  portion of the exam. No photopenic defects appreciated. Impression  IMPRESSION: Normal VQ scan. No scintigraphic evidence for pulmonary embolus. US Results (maximum last 3): No results found for this or any previous visit. DEXA Results (maximum last 3): No results found for this or any previous visit. ERI Results (maximum last 3): No results found for this or any previous visit. IR Results (maximum last 3): No results found for this or any previous visit. VAS/US Results (maximum last 3): No results found for this or any previous visit. PET Results (maximum last 3): No results found for this or any previous visit.         EKG Results     Procedure 720 Value Units Date/Time    EKG [[id number]] Collected: 10/25/21 1112    Order Status: Completed Updated: 10/26/21 0700     Ventricular Rate 64 BPM      Atrial Rate 67 BPM      QRS Duration 108 ms      Q-T Interval 416 ms      QTC Calculation (Bezet) 429 ms      Calculated R Axis -29 degrees      Calculated T Axis 51 degrees      Diagnosis --     Atrial fibrillation  Incomplete left bundle branch block  Minimal voltage criteria for LVH, may be normal variant  Abnormal ECG  When compared with ECG of 19-OCT-2021 08:06,  T wave inversion no longer evident in Inferior leads  Nonspecific T wave abnormality, improved in Anterolateral leads  Confirmed by Patsy Rojas ([id number]) on 10/26/2021 6:59:52 AM              Thank you Trish Roberson MD for allowing us to participate in the care of this interesting patient.      Jaime Hoskins MD  10/29/2021 12:37 PM

## 2021-10-29 NOTE — DISCHARGE INSTRUCTIONS
Patient Education        Angina: Care Instructions  Your Care Instructions     You have a problem called angina. Angina happens when there is not enough blood flow to your heart muscle. Angina is a sign of coronary artery disease (CAD). CAD occurs when blood vessels that supply the heart become narrowed. Having CAD increases your risk of a heart attack. Chest pain or pressure is the most common symptom of angina. But some people have other symptoms, like:  · Pain, pressure, or a strange feeling in the back, neck, jaw, or upper belly, or in one or both shoulders or arms. · Shortness of breath. · Nausea or vomiting. · Lightheadedness or sudden weakness. · Fast or irregular heartbeat. Women are somewhat more likely than men to have angina symptoms like shortness of breath, nausea, and back or jaw pain. Angina can be dangerous. That's why it is important to pay attention to your symptoms. Know what is typical for you, learn how to control your symptoms, and understand when you need to get treatment. A change in your usual pattern of symptoms is an emergency. It may mean that you are having a heart attack. The doctor has checked you carefully, but problems can develop later. If you notice any problems or new symptoms, get medical treatment right away. Follow-up care is a key part of your treatment and safety. Be sure to make and go to all appointments, and call your doctor if you are having problems. It's also a good idea to know your test results and keep a list of the medicines you take. How can you care for yourself at home? Medicines    · If your doctor has given you nitroglycerin for angina symptoms, keep it with you at all times. If you have symptoms, sit down and rest, and take the first dose of nitroglycerin as directed. If your symptoms get worse or are not getting better within 5 minutes, call 911 right away. Stay on the phone.  The emergency  will give you further instructions.     · If your doctor advises it, take 1 low-dose aspirin a day to prevent heart attack.     · Be safe with medicines. Take your medicines exactly as prescribed. Call your doctor if you think you are having a problem with your medicine. You will get more details on the specific medicines your doctor prescribes. Lifestyle changes    · Do not smoke. If you need help quitting, talk to your doctor about stop-smoking programs and medicines. These can increase your chances of quitting for good.     · Eat a heart-healthy diet that is low in saturated fat and salt, and is high in fiber. Talk to your doctor or a dietitian about healthy eating.     · Stay at a healthy weight. Or lose weight if you need to. Activity    · Talk to your doctor about a level of activity that is safe for you.     · If an activity causes angina symptoms, stop and rest.   When should you call for help? Call 911 anytime you think you may need emergency care. For example, call if:    · You passed out (lost consciousness).     · You have symptoms of a heart attack. These may include:  ? Chest pain or pressure, or a strange feeling in the chest.  ? Sweating. ? Shortness of breath. ? Nausea or vomiting. ? Pain, pressure, or a strange feeling in the back, neck, jaw, or upper belly or in one or both shoulders or arms. ? Lightheadedness or sudden weakness. ? A fast or irregular heartbeat. After you call 911, the  may tell you to chew 1 adult-strength or 2 to 4 low-dose aspirin. Wait for an ambulance. Do not try to drive yourself.     · You have angina symptoms that do not go away with rest or are not getting better within 5 minutes after you take a dose of nitroglycerin. Call your doctor now if:    · Your angina symptoms seem worse but still follow your typical pattern. You can predict when symptoms will happen, but they may come on sooner, feel worse, or last longer.     · You feel dizzy or lightheaded, or you feel like you may faint.    Watch closely for changes in your health, and be sure to contact your doctor if you have any problems. Where can you learn more? Go to http://www.gray.com/  Enter H129 in the search box to learn more about \"Angina: Care Instructions. \"  Current as of: April 29, 2021               Content Version: 13.0  © 8318-7290 [employer]. Care instructions adapted under license by [employer] (which disclaims liability or warranty for this information). If you have questions about a medical condition or this instruction, always ask your healthcare professional. [place] [address] any warranty or liability for your use of this information. Patient Education   Patient Education   Patient Education   Patient Education      Sucralfate (Carafate) - (By mouth)   Why this medicine is used:   Treats ulcers. Contact a nurse or doctor right away if you have:  Chest pain, trouble breathing, coughing up blood  Increased hunger or thirst, change in how much or how often you urinate, unusual weight loss  Numbness or weakness in your arm or leg, or on one side of your body  Pain in your lower leg (calf)  Sudden or severe headache, problems with vision, speech, or walking  Rash, hives, itching, swelling of the face or mouth     Common side effects:  Constipation, stomach upset or cramps, passing gas, diarrhea  Dry mouth, dizziness  © 2017 [facility] Information is for End User's use only and may not be sold, redistributed or otherwise used for commercial purposes. Pantoprazole (Protonix) - (By mouth)   Why this medicine is used:   Treats gastroesophageal reflux disease (GERD), a damaged esophagus, and high levels of stomach acid.   Contact a nurse or doctor right away if you have:  · Blistering, peeling, red skin rash  · Swelling, muscle pain, stiffness, cramps, or twitching  · Joint pain, rash on your cheeks or arms that gets worse in the sun  · Dark-colored urine, change in how much or how often you urinate  · Seizures, dizziness, uneven heartbeat  · Severe diarrhea, stomach cramps, fever, weight gain     Common side effects:  · Mild diarrhea, stomach pain  · Headache, tiredness  © 2017 [facility] Information is for End User's use only and may not be sold, redistributed or otherwise used for commercial purposes. Colchicine (Colcrys, Mitigare) - (By mouth)   Why this medicine is used:   Treats and prevents gout attacks. Also treats familial Mediterranean fever (FMF). Contact a nurse or doctor right away if you have:  · Bloody or black tarry stools; red or dark brown urine  · Fever, chills, cough, sore throat, body aches  · Muscle pain, tenderness, or weakness     Common side effects:  · Mild diarrhea, stomach pain, cramps  · Vomiting, nausea  © 2017 [facility] Information is for End User's use only and may not be sold, redistributed or otherwise used for commercial purposes. Cephalexin (Bio-Cef, Keflex) - (By mouth)   Why this medicine is used:   Treats infections. Contact a nurse or doctor right away if you have:  · Blistering, peeling, or red skin rash  · Severe or bloody diarrhea     Common side effects:  · Mild diarrhea or nausea  © 2017 [address] is for End User's use only and may not be sold, redistributed or otherwise used for commercial purposes.

## 2021-10-29 NOTE — DISCHARGE SUMMARY
[facility] Cardiology Discharge Summary     Patient ID:  Lupe   [id number]  66 y.o.  1950    Admit date: 10/25/2021    Discharge date:  10/29/2021    Admitting Physician: Merilee Buerger, MD     Discharge Physician: Dr. Kelly Hunt    Admission Diagnoses: Chest pain [R07.9]    Discharge Diagnoses:    Diagnosis    Permanent atrial fibrillation ([address]    S/p Watchman    Cystitis    Chest pain    Severe obesity ([address]    Nonobstructive coronary artery disease    Shortness of breath    Chronic fatigue    Hypercholesteremia    Gastritis     Abdominal pain    Hypertension     Cardiology Procedures this admission:  Diagnostic left heart catheterization  EchoCardiogram  PREETHI/Cardioversion  Consults: GI and Hospitalist    Hospital Course: Patient is a 64-year-old white female with history of atrial fibrillation who underwent left atrial appendage occlusion on October 19 without complication. She was discharged home the following day. She noted on Saturday while attending a class that she became diaphoretic and lightheaded. She was told by other members of the class that she appeared pale. This resolved but she was left with chest pressure. This lasted the majority of the day but did resolve. She had no symptoms on Sunday but woke up this morning with recurrent chest discomfort. Some improvement with nitroglycerin. Stat echocardiogram shows no pericardial effusion. Review of chest x-ray shows the watchman appears to be in a appropriate position. There are no wall motion abnormalities on her echo. She has a history of nonobstructive coronary artery disease based on previous catheterization. Patient was admitted to telemetry for chest pain of uncertain etiology. PREETHI was performed and showed good position with no residual leak of Watchman FLX Device. Patient underwent cardiac catheterization by Dr. Paulina De.  Patient was found to have mild nonobstructive coronary artery disease with no impingement of her left circumflex from watchman implantation, and normal LV systolic function. Patient tolerated the procedure well and was taken to the telemetry floor for recovery. Pt started on colchicine for possible focal pericarditis. ECHO was performed and showed:   Left Ventricle Normal cavity size and systolic function (ejection fraction normal). Mild concentric hypertrophy. The estimated EF is 60 - 65%. Left Atrium Severely dilated left atrium. Right Ventricle Normal cavity size and global systolic function. Right Atrium Severely dilated right atrium. Interatrial Septum Left to right shunt seen in the interatrial septum likely reflecting recent transseptal puncture from left atrial appendage occlusion   Aortic Valve Trileaflet valve structure and no stenosis. Aortic valve sclerosis. Trace aortic valve regurgitation. Mitral Valve Normal valve structure and no stenosis. Mild regurgitation. Tricuspid Valve Normal valve structure and no stenosis. Moderate regurgitation. Right Ventricular Arterial Pressure (RVSP) is 51 mmHg. Pulmonic Valve Normal valve structure and no stenosis. Trace regurgitation. IVC/Hepatic Veins Normal structure. Normal central venous pressure (3 mmHg); IVC diameter is less than 21 mm and collapses more than 50% with respiration. Pericardium No evidence of pericardial effusion. The following morning, patient c/o left-sided abdominal pain worse with palpation and movement. She also c/o UTI symptoms and reports recurrent UTIs. Gastroenterology was consulted. EGD was performed and showed ESOPHAGUS: Normal STOMACH: Small hiatal hernia. Nadir-en-y gastric bypass anatomy. Mild gastritis with single erosion in gastric pouch. Cold-forceps biopsies for pathology. Exam negative for anastomotic ulcer. Anastomosis is patent. Otherwise normal exam. JEJUNUM: Nadir limb is normal. Patient started on protonix 40 mg BID and carafate 1g PO QAC and QHS for 2 months.  Spoke with Dr Marino Medina who states patient can restart Eliquis. Hospitalist consulted for UTI. Patient treated with antibiotics. CT abdomen and pelvis performed secondary to abdominal mass felt. CT showed no acute abnormality. The morning of discharge, patient was up feeling well without any complaints of chest pain or shortness of breath. Patient's right radial cath site was clean, dry and intact without hematoma or bruit. Patient's labs were stable. Patient was seen and examined by Dr. Cecy Mayo and determined stable and ready for discharge. Patient was instructed on the importance of medication compliance and ofice follow up. The patient will follow up with [facility] Cardiology -- Dr. Lucy Arango on Nov 16th at 8:15 am in [facility]. DISPOSITION: The patient is being discharged home in stable condition on a low saturated fat, low cholesterol and low salt diet. The patient is instructed to advance activities as tolerated to the limit of fatigue or shortness of breath. The patient is instructed to avoid all heavy lifting, straining, stooping or squatting for 3-5 days. The patient is instructed to watch the cath site for bleeding/oozing; if seen, the patient is instructed to apply firm pressure with a clean cloth and call [facility]iology at [phone]. The patient is instructed to watch for signs of infection which include: increasing area of redness, fever/hot to touch or purulent drainage at the catheterization site. The patient is instructed not to soak in a bathtub for 7-10 days, but is cleared to shower. The patient is instructed to call the office or return to the ER for immediate evaluation for any shortness of breath or chest pain not relieved by NTG. Discharge Exam: Patient has been seen by Dr. Cecy Mayo: see his progress note for exam details.     Visit Vitals  BP (!) 117/57 (BP Patient Position: Standing)   Pulse 72   Temp 97.7 °F (36.5 °C)   Resp 20   Ht 5' 3\" (1.6 m)   Wt 98.7 kg (217 lb 8 oz)   SpO2 96%   BMI 38.53 kg/m²       Recent Results (from the past 24 hour(s))   METABOLIC PANEL, BASIC    Collection Time: 10/29/21  5:31 AM   Result Value Ref Range    Sodium 134 (L) 136 - 145 mmol/L    Potassium 4.0 3.5 - 5.1 mmol/L    Chloride 103 98 - 107 mmol/L    CO2 29 21 - 32 mmol/L    Anion gap 2 (L) 7 - 16 mmol/L    Glucose 95 65 - 100 mg/dL    BUN 8 8 - 23 MG/DL    Creatinine 0.92 0.6 - 1.0 MG/DL    GFR est AA >60 >60 ml/min/1.73m2    GFR est non-AA >60 >60 ml/min/1.73m2    Calcium 8.7 8.3 - 10.4 MG/DL   CBC W/O DIFF    Collection Time: 10/29/21  5:31 AM   Result Value Ref Range    WBC 5.2 4.3 - 11.1 K/uL    RBC 3.58 (L) 4.05 - 5.2 M/uL    HGB 10.2 (L) 11.7 - 15.4 g/dL    HCT 32.2 (L) 35.8 - 46.3 %    MCV 89.9 79.6 - 97.8 FL    MCH 28.5 26.1 - 32.9 PG    MCHC 31.7 31.4 - 35.0 g/dL    RDW 13.9 11.9 - 14.6 %    PLATELET 169 212 - 068 K/uL    MPV 9.2 (L) 9.4 - 12.3 FL    ABSOLUTE NRBC 0.00 0.0 - 0.2 K/uL   MAGNESIUM    Collection Time: 10/29/21  5:31 AM   Result Value Ref Range    Magnesium 2.2 1.8 - 2.4 mg/dL     Patient Instructions:   Current Discharge Medication List      START taking these medications    Details   cephALEXin (KEFLEX) 500 mg capsule Take 1 Capsule by mouth four (4) times daily for 4 days. Qty: 16 Capsule, Refills: 0  Start date: 10/29/2021, End date: 11/2/2021      Saccharomyces boulardii (FLORASTOR) 250 mg capsule Take 1 Capsule by mouth two (2) times a day for 5 days. Qty: 10 Capsule, Refills: 0  Start date: 10/29/2021, End date: 11/3/2021      sucralfate (CARAFATE) 100 mg/mL suspension Take 10 mL by mouth four (4) times daily for 60 days. Qty: 1200 mL, Refills: 1  Start date: 10/29/2021, End date: 12/28/2021      pantoprazole (PROTONIX) 40 mg tablet Take 1 Tablet by mouth daily. Qty: 30 Tablet, Refills: 1  Start date: 10/29/2021      colchicine 0.6 mg tablet Take 1 Tablet by mouth daily.   Qty: 30 Tablet, Refills: 0  Start date: 10/30/2021         CONTINUE these medications which have CHANGED    Details   losartan (COZAAR) 25 mg tablet Take 0.5 Tablets by mouth daily. TAKE ONE TABLET BY MOUTH ONCE DAILY. Qty: 90 Tablet, Refills: 3  Start date: 10/29/2021      metoprolol succinate (TOPROL-XL) 25 mg XL tablet Take 0.5 Tablets by mouth daily. 1/2 qd  Qty: 90 Tablet, Refills: 3  Start date: 10/29/2021         CONTINUE these medications which have NOT CHANGED    Details   aspirin delayed-release 81 mg tablet Take 1 Tablet by mouth daily. Qty: 30 Tablet, Refills: 0      nabumetone (RELAFEN) 750 mg tablet Take 750 mg by mouth two (2) times a day. As needed      apixaban (ELIQUIS) 5 mg tablet Take 1 Tablet by mouth two (2) times a day. Qty: 180 Tablet, Refills: 3      furosemide (Lasix) 40 mg tablet Take 1 Tablet by mouth daily. Qty: 90 Tablet, Refills: 3      potassium chloride SR (KLOR-CON 10) 10 mEq tablet Take 1 Tablet by mouth daily. Qty: 90 Tablet, Refills: 3      spironolactone (ALDACTONE) 25 mg tablet Take 1 Tablet by mouth daily. Qty: 90 Tablet, Refills: 3      nitroglycerin (NITROSTAT) 0.4 mg SL tablet 1 Tablet by SubLINGual route every five (5) minutes as needed for Chest Pain. Qty: 25 Tablet, Refills: 11      polyethylene glycol (Miralax) 17 gram/dose powder Take 17 g by mouth daily. multivitamin (ONE A DAY) tablet Take 1 Tab by mouth daily. estradiol (ESTRACE) 2 mg tablet Take 1 mg by mouth daily. 1/2 tablet daily      montelukast (SINGULAIR) 10 mg tablet Take 10 mg by mouth daily. fluticasone (FLONASE) 50 mcg/actuation nasal spray 2 Sprays by Both Nostrils route daily.          STOP taking these medications       omeprazole (PRILOSEC) 20 mg capsule Comments:   Reason for Stopping:               Signed:  TONYA Mar  10/29/2021  10:03 AM

## 2021-10-29 NOTE — ROUTINE PROCESS
Discharge instructions reviewed with patient and . Prescriptions given for Colcrys, Cephalexin, Florastor, Protonix, and Carafate and med info sheets provided for all new medications. Opportunity for questions provided. Patient and  voiced understanding of all discharge instructions.

## 2021-10-29 NOTE — PROGRESS NOTES
[facility] Progress Note         Admit Date:  10/25/2021    Today's Date:  10/29/2021    CC: Abdominal pain    Subjective:     Patient is s/p EGD 10/28 with findings below. This morning she feels better. She continues to report left sided pain but says this is improved. Epigastric discomfort that she had yesterday has resolved. She is having good volume BMs. She denies GI bleeding, N/V. She is tolerating clear liquid diet.     Medications:   Current Facility-Administered Medications   Medication Dose Route Frequency    metoprolol succinate (TOPROL-XL) XL tablet 12.5 mg  12.5 mg Oral DAILY    losartan (COZAAR) tablet 12.5 mg  12.5 mg Oral DAILY    cefTRIAXone (ROCEPHIN) 1 g in 0.9% sodium chloride (MBP/ADV) 50 mL MBP  1 g IntraVENous Q24H    pantoprazole (PROTONIX) tablet 40 mg  40 mg Oral ACB/HS    sucralfate (CARAFATE) tablet 1 g  1 g Oral AC&HS    traMADoL (ULTRAM) tablet 50 mg  50 mg Oral Q8H PRN    acetaminophen (TYLENOL) tablet 650 mg  650 mg Oral Q6H PRN    nitroglycerin (NITROBID) 2 % ointment 1 Inch  1 Inch Topical Q6H PRN    lidocaine (XYLOCAINE) 2 % viscous solution 15 mL  15 mL Mouth/Throat PRN    midazolam (VERSED) injection 0.5-2 mg  0.5-2 mg IntraVENous Multiple    fentaNYL citrate (PF) injection 25-50 mcg  25-50 mcg IntraVENous Multiple    colchicine tablet 0.6 mg  0.6 mg Oral DAILY    sodium chloride (NS) flush 5-10 mL  5-10 mL IntraVENous Q8H    sodium chloride (NS) flush 5-10 mL  5-10 mL IntraVENous PRN    aspirin delayed-release tablet 81 mg  81 mg Oral DAILY    estradioL (ESTRACE) tablet 1 mg  1 mg Oral DAILY    fluticasone propionate (FLONASE) 50 mcg/actuation nasal spray 2 Spray  2 Spray Both Nostrils DAILY    montelukast (SINGULAIR) tablet 10 mg  10 mg Oral DAILY    nabumetone (RELAFEN) tablet 750 mg (Patient Supplied)  750 mg Oral BID PRN    polyethylene glycol (MIRALAX) packet 17 g  17 g Oral DAILY    sodium chloride (NS) flush 5-40 mL  5-40 mL IntraVENous Q8H    sodium chloride (NS) flush 5-40 mL  5-40 mL IntraVENous PRN    nitroglycerin (NITROSTAT) tablet 0.4 mg  0.4 mg SubLINGual Q5MIN PRN    ondansetron (ZOFRAN) injection 4 mg  4 mg IntraVENous Q4H PRN    influenza vaccine 2021-22 (6 mos+)(PF) (FLUARIX/FLULAVAL/FLUZONE QUAD) injection 0.5 mL  1 Each IntraMUSCular PRIOR TO DISCHARGE       Review of Systems:  ROS was obtained, with pertinent positives as listed above. No chest pain or SOB. Diet:  Clear liquids    Objective:   Vitals:  Visit Vitals  BP (!) 117/57 (BP Patient Position: Standing)   Pulse 72   Temp 97.7 °F (36.5 °C)   Resp 20   Ht 5' 3\" (1.6 m)   Wt 98.7 kg (217 lb 8 oz)   SpO2 96%   BMI 38.53 kg/m²     Intake/Output:  10/29 0701 - 10/29 1900  In: 60 [P.O.:60]  Out: -   10/27 1901 - 10/29 0700  In: 220 [P.O.:120; I.V.:100]  Out: 0   Exam:  General appearance: alert, cooperative, no distress  Lungs: clear to auscultation bilaterally anteriorly  Heart: regular rate and rhythm  Abdomen: soft, Mild mid to upper left sided ttp (improved per pt). Bowel sounds normal.   Extremities: extremities normal, atraumatic, no cyanosis or edema  Neuro:  alert and oriented    Data Review (Labs):    Recent Labs     10/29/21  0531 10/28/21  0439 10/27/21  0600   WBC 5.2  --   --    HGB 10.2*  --   --    HCT 32.2*  --   --      --   --    MCV 89.9  --   --    * 134* 135*   K 4.0 4.0 4.5    100 103   CO2 29 29 26   BUN 8 12 15   CREA 0.92 1.04* 1.00   CA 8.7 9.1 8.1*   MG 2.2  --   --    GLU 95 100 93        CT a/p at [facility] 10/26/18 FINDINGS   CT lung base: No significant infiltrates or effusions. CT abdomen:   Again noted is the hemangioma in the dome of liver.  No other new lesions are seen within the liver, spleen, pancreas, adrenal glands.  Patient is post cholecystectomy. Kidneys demonstrates no masses or hydronephrosis.  No significant retroperitoneal adenopathy.  No mesenteric adenopathy.    GI tract:  Postsurgical changes in the stomach from prior gastric bypass.  Small bowel loops are not significantly thickened or dilated.  The appendix is normal.  Small to moderate amount of stool seen the colon.  The colon is extremely tortuous.  There is some diverticulosis of the sigmoid colon without obvious diverticulitis. CT pelvis:  No pelvic masses or adenopathy is seen.  Patient status post hysterectomy.  Bladder is unremarkable.  Fat extends into the inguinal canal without herniated bowel. Musculoskeletal: Degenerative changes and facet disease is seen in the spine. IMPRESSION  1.  Sigmoid diverticulosis without evidence for diverticulitis. 2.  Small to moderate amount of stool in the colon     [facility] series at [facility] 8/1/19  FINDINGS:    views demonstrate moderate colonic stool burden was prominent in the right:.  Postoperative changes gastric bypass in the left upper quadrant.  No evidence of bowel obstruction.  Prior cholecystectomy noted. Contrast was administered. Eric Fail is a small contrast present in the duodenum at 15 minutes.  No discrete contrast within the excluded stomach identified on initial image.       The small bowel is normal in caliber.  Normal appearance of the jejunum and ileum without evidence of a discrete mucosal lesion.  Contrast entered the colon at 120 minutes representing normal transit time. IMPRESSION:  1.  Moderate colonic stool burden suggests constipation. 2.  Otherwise normal small bowel follow-through. CT a/p w contrast 10/27/21 INDICATION: palpable, painful mass in RUQ. COMPARISON: None. FINDINGS:  Visualized thorax: Normal.  Liver: Normal in size and morphology. Peripherally enhancing hypoattenuating lesion in the hepatic dome favored to reflect a hemangioma. Gallbladder/biliary: Gallbladder surgically absent. No biliary dilatation. Pancreas: Normal.  Spleen: Normal.  Adrenals: Normal.  Kidneys: No focal lesion or hydronephrosis.   Bladder: Normal.  Pelvic organs: Uterus is surgically absent. No adnexal mass. Gastrointestinal: Colonic diverticulosis without diverticulitis. Postsurgical changes of Nadir-en-Y gastric bypass surgery. Peritoneum/retroperitoneum: Normal.  Lymph nodes: Normal.  Vessels: Normal  Bones/Soft tissues: Normal.  IMPRESSION: No acute abnormality or visualized etiology for the painful, palpable mass in the right upper quadrant. EGD 10/28/21   FINDINGS:  ESOPHAGUS: Normal  STOMACH: Small hiatal hernia. Nadir-en-y gastric bypass anatomy. Mild gastritis with single erosion in gastric pouch. Cold-forceps biopsies for pathology. Exam negative for anastomotic ulcer. Anastomosis is patent. Otherwise normal exam.  JEJUNUM: Nadir limb is normal.  Estimated blood loss: 0-minimal   Specimens obtained during procedure: 1. Gastric biopsies    Assessment:     Active Problems:    Chest pain (10/25/2021)      IBS (irritable bowel syndrome) (10/27/2021)      Cystitis (10/27/2021)      66 y.o. female with PMH AFib s/p recent Watchman placement on 10/19 (20 mm device), h/o NICM with improvement in EF to 55-60% (on 10/19 PREETHI), PHTN, HTN, LAKEISHA, dyslipidemia, minimal CAD by [facility] 2016, mild chronic renal insufficiency, obesity s/p gastric bypass 2006, GERD, IBS, seen in GI consult 10/27 at the request of Neisha Bean PA-C for abdominal pain. She was admitted 10/25/21 with chest pain, SOB, nausea, left mid to upper abdominal pain, near syncope and is now s/p cardiac workup with no cardiac source identified. Now with ongoing left mid abdominal pain, indigestion 10/27, N/V, epigastric discomfort, hx constipation but having BMs on MiraLax without affect to her pain. Pain is not affected by eating. Pain is especially worse with movement. On exam had +Carnett sign suggestive of abdominal muscle wall pain, though dose of Carafate reportedly helped. Was on Eliquis and this now held, remains on ASA 81mg, Colchicine for possible pericarditis/inflammation.   Also, being seen by hospitalist for UTI.  Labs reveal normal LFTs, Lipase, CRP. CT a/p 10/27 was unremarkable. Per [facility] note 6/2021- EGD 4/2009 was negative for Borges's and last Colonoscopy 12/2018, small bowel series 8/2019 were unremarkable except SB study noted moderate colonic stool burden. She is s/p EGD 10/28 with findings above. Now improved. Plan:     - Follow-up pathology  - Follow up H.pylori serology  - Protonix 40 mg daily  - Carafate 1g PO QAC and QHS for 2 months (slurry)  - Continue MiraLax 17g daily for bowel regularity and adjust dose as needed. - Advance as tolerated. From GI standpoint, ok for discharge home. She may follow up with Dr. Xin Tan as outpatient, though she says she may like to follow up with our office instead. Pt informed that I will have our office contact her with office follow up and she can inform them whether she would like to schedule at that time. Karol Cross PA-C  [facility]     Patient is seen and examined in collaboration with Dr. Sydnie Jackman. Assessment and plan as per Dr. Sydnie Jackman.

## 2021-11-01 ENCOUNTER — HOSPITAL ENCOUNTER (EMERGENCY)
Age: 71
Discharge: HOME | End: 2021-11-01
Attending: EMERGENCY MEDICINE
Payer: MEDICARE

## 2021-11-01 ENCOUNTER — APPOINTMENT (OUTPATIENT)
Dept: ULTRASOUND IMAGING | Age: 71
End: 2021-11-01
Attending: EMERGENCY MEDICINE
Payer: MEDICARE

## 2021-11-01 VITALS
HEART RATE: 65 BPM | SYSTOLIC BLOOD PRESSURE: 128 MMHG | RESPIRATION RATE: 18 BRPM | TEMPERATURE: 97.8 F | BODY MASS INDEX: 37.73 KG/M2 | DIASTOLIC BLOOD PRESSURE: 77 MMHG | OXYGEN SATURATION: 100 % | WEIGHT: 213 LBS

## 2021-11-01 DIAGNOSIS — M79.605 LEFT LEG PAIN: Primary | ICD-10-CM

## 2021-11-01 LAB
ALBUMIN SERPL-MCNC: 3.7 G/DL (ref 3.2–4.6)
ALBUMIN/GLOB SERPL: 0.8 {RATIO} (ref 1.2–3.5)
ALP SERPL-CCNC: 91 U/L (ref 50–136)
ALT SERPL-CCNC: 22 U/L (ref 12–65)
ANION GAP SERPL CALC-SCNC: 6 MMOL/L (ref 7–16)
AST SERPL-CCNC: 18 U/L (ref 15–37)
ATRIAL RATE: 70 BPM
BASOPHILS # BLD: 0.1 K/UL (ref 0–0.2)
BASOPHILS NFR BLD: 1 % (ref 0–2)
BILIRUB SERPL-MCNC: 0.5 MG/DL (ref 0.2–1.1)
BUN SERPL-MCNC: 11 MG/DL (ref 8–23)
CALCIUM SERPL-MCNC: 9.5 MG/DL (ref 8.3–10.4)
CALCULATED R AXIS, ECG10: -19 DEGREES
CALCULATED T AXIS, ECG11: 75 DEGREES
CHLORIDE SERPL-SCNC: 104 MMOL/L (ref 98–107)
CO2 SERPL-SCNC: 29 MMOL/L (ref 21–32)
CREAT SERPL-MCNC: 1.1 MG/DL (ref 0.6–1)
DIAGNOSIS, 93000: NORMAL
DIFFERENTIAL METHOD BLD: ABNORMAL
EOSINOPHIL # BLD: 0.2 K/UL (ref 0–0.8)
EOSINOPHIL NFR BLD: 3 % (ref 0.5–7.8)
ERYTHROCYTE [DISTWIDTH] IN BLOOD BY AUTOMATED COUNT: 14 % (ref 11.9–14.6)
GLOBULIN SER CALC-MCNC: 4.4 G/DL (ref 2.3–3.5)
GLUCOSE SERPL-MCNC: 88 MG/DL (ref 65–100)
HCT VFR BLD AUTO: 40.2 % (ref 35.8–46.3)
HGB BLD-MCNC: 12.6 G/DL (ref 11.7–15.4)
IMM GRANULOCYTES # BLD AUTO: 0 K/UL (ref 0–0.5)
IMM GRANULOCYTES NFR BLD AUTO: 1 % (ref 0–5)
LYMPHOCYTES # BLD: 2 K/UL (ref 0.5–4.6)
LYMPHOCYTES NFR BLD: 30 % (ref 13–44)
MCH RBC QN AUTO: 28.3 PG (ref 26.1–32.9)
MCHC RBC AUTO-ENTMCNC: 31.3 G/DL (ref 31.4–35)
MCV RBC AUTO: 90.1 FL (ref 79.6–97.8)
MONOCYTES # BLD: 0.6 K/UL (ref 0.1–1.3)
MONOCYTES NFR BLD: 8 % (ref 4–12)
NEUTS SEG # BLD: 3.7 K/UL (ref 1.7–8.2)
NEUTS SEG NFR BLD: 56 % (ref 43–78)
NRBC # BLD: 0 K/UL (ref 0–0.2)
PLATELET # BLD AUTO: 323 K/UL (ref 150–450)
PMV BLD AUTO: 9.3 FL (ref 9.4–12.3)
POTASSIUM SERPL-SCNC: 3.5 MMOL/L (ref 3.5–5.1)
PROT SERPL-MCNC: 8.1 G/DL (ref 6.3–8.2)
Q-T INTERVAL, ECG07: 416 MS
QRS DURATION, ECG06: 106 MS
QTC CALCULATION (BEZET), ECG08: 442 MS
RBC # BLD AUTO: 4.46 M/UL (ref 4.05–5.2)
SODIUM SERPL-SCNC: 139 MMOL/L (ref 136–145)
VENTRICULAR RATE, ECG03: 68 BPM
WBC # BLD AUTO: 6.6 K/UL (ref 4.3–11.1)

## 2021-11-01 PROCEDURE — 93971 EXTREMITY STUDY: CPT

## 2021-11-01 PROCEDURE — 93005 ELECTROCARDIOGRAM TRACING: CPT | Performed by: EMERGENCY MEDICINE

## 2021-11-01 PROCEDURE — 80053 COMPREHEN METABOLIC PANEL: CPT

## 2021-11-01 PROCEDURE — 85025 COMPLETE CBC W/AUTO DIFF WBC: CPT

## 2021-11-01 PROCEDURE — 99284 EMERGENCY DEPT VISIT MOD MDM: CPT

## 2021-11-01 RX ORDER — SODIUM CHLORIDE 0.9 % (FLUSH) 0.9 %
5-10 SYRINGE (ML) INJECTION EVERY 8 HOURS
Status: DISCONTINUED | OUTPATIENT
Start: 2021-11-01 | End: 2021-11-01 | Stop reason: HOSPADM

## 2021-11-01 RX ORDER — SODIUM CHLORIDE 0.9 % (FLUSH) 0.9 %
5-10 SYRINGE (ML) INJECTION AS NEEDED
Status: DISCONTINUED | OUTPATIENT
Start: 2021-11-01 | End: 2021-11-01 | Stop reason: HOSPADM

## 2021-11-01 NOTE — ED TRIAGE NOTES
Patient with recent admission from mon-Friday in the hospital.  Patient presents today with complaints of left lower leg swelling. Patient sent by cardiologist for evaluation of DVT. Patient with PREETHI, left radial cath during admission.   Denies any cath or procedure in her left lower extremity

## 2021-11-01 NOTE — ED NOTES
I have reviewed discharge instructions with the patient and spouse. The patient and spouse verbalized understanding. Patient left ED via Discharge Method: ambulatory to Home with   Opportunity for questions and clarification provided. Patient given 0 scripts. To continue your aftercare when you leave the hospital, you may receive an automated call from our care team to check in on how you are doing. This is a free service and part of our promise to provide the best care and service to meet your aftercare needs.  If you have questions, or wish to unsubscribe from this service please call [phone]. Thank you for Choosing our [facility] Emergency Department.

## 2021-11-01 NOTE — DISCHARGE INSTRUCTIONS
Tylenol 1000 mg every 6-8 hours as needed for pain. Ice to the sore area for 15 minutes every 4 hours while awake for 5 to 7 days then change to heat for a few days. Return if redness warmth or fever. Return if any new or concerning symptoms. Medications as directed by cardiology and your primary care doctor.   Recommend repeat ultrasound in approximately 4 weeks if symptoms persist.

## 2021-11-01 NOTE — ED PROVIDER NOTES
26-year-old female recently admitted for heart cath EGD was sent by cardiology to evaluate for DVT due to several days of left leg pain and swelling. Patient describes a sharp pain in her left lateral hip that radiates around to her medial thigh and towards her knee. She complains of swelling in the area of her knee but denies any warmth or redness. She has chronic left greater than right swelling however. She denies any chest pain or trouble breathing. No fevers or chills. She denies any trauma or injury. Patient believes it is due to 1 of several medication she was recently started on including Carafate, Protonix and colchicine. She states she was instructed by cardiology to stop the colchicine.            Past Medical History:   Diagnosis Date    Arrhythmia     Arthritis     CAD (coronary artery disease)     Cancer ([facility] [address]     Congestive heart failure (HCC)     Essential hypertension     GERD (gastroesophageal reflux disease)     Hyperlipidemia     Morbid obesity (HCC)     Nausea & vomiting     Sleep apnea        Past Surgical History:   Procedure Laterality Date    HX CHOLECYSTECTOMY      HX GASTRIC BYPASS      HX HYSTERECTOMY      HX ORTHOPAEDIC      right rotator cuff    HX ORTHOPAEDIC      bilateral hand surgeries    HX TONSILLECTOMY           Family History:   Problem Relation Age of Onset    Coronary Artery Disease Father     Heart Attack Father     Stroke Father     Sudden Death Father        Social History     Socioeconomic History    Marital status:      Spouse name: Not on file    Number of children: Not on file    Years of education: Not on file    Highest education level: Not on file   Occupational History    Not on file   Tobacco Use    Smoking status: Never Smoker    Smokeless tobacco: Never Used   Substance and Sexual Activity    Alcohol use: No    Drug use: Not on file    Sexual activity: Not on file   Other Topics Concern    Not on file   Social History Narrative    Not on file     Social Determinants of Health     Financial Resource Strain:     Difficulty of Paying Living Expenses:    Food Insecurity:     Worried About Running Out of Food in the Last Year:     [address] in the Last Year:    Transportation Needs:     Lack of Transportation (Medical):  Lack of Transportation (Non-Medical):    Physical Activity:     Days of Exercise per Week:     Minutes of Exercise per Session:    Stress:     Feeling of Stress :    Social Connections:     Frequency of Communication with Friends and Family:     Frequency of Social Gatherings with Friends and Family:     Attends Denominational Services:     Active Member of Clubs or Organizations:     Attends Club or Organization Meetings:     Marital Status:    Intimate Partner Violence:     Fear of Current or Ex-Partner:     Emotionally Abused:     Physically Abused:     Sexually Abused: ALLERGIES: Latex, Other food, Atorvastatin, Azo pms [black [email or web address]], Codeine, Hydrocodone, Nickel, Oxycodone, Phenazopyridine, and Statins-hmg-coa reductase inhibitors    Review of Systems   Constitutional: Negative for chills and fever. Respiratory: Negative for cough and shortness of breath. Cardiovascular: Positive for leg swelling. Negative for chest pain. Gastrointestinal: Negative for abdominal pain, nausea and vomiting. Musculoskeletal: Positive for back pain. Negative for neck pain. Skin: Negative for color change, rash and wound. Neurological: Negative for weakness and numbness. All other systems reviewed and are negative. Vitals:    11/01/21 1541   BP: 128/65   Pulse: 79   Resp: 16   Temp: 98 °F (36.7 °C)   SpO2: 96%   Weight: 96.6 kg (213 lb)            Physical Exam  Vitals and nursing note reviewed. Constitutional:       General: She is not in acute distress. Appearance: She is well-developed. HENT:      Head: Normocephalic.    Eyes:      Pupils: Pupils are equal, round, and reactive to light. Cardiovascular:      Rate and Rhythm: Normal rate and regular rhythm. Heart sounds: Normal heart sounds. Pulmonary:      Effort: Pulmonary effort is normal.      Breath sounds: Normal breath sounds. Abdominal:      General: There is no distension. Palpations: Abdomen is soft. There is no mass. Tenderness: There is no abdominal tenderness. There is no guarding or rebound. Musculoskeletal:         General: No tenderness, deformity or signs of injury. Normal range of motion. Comments: Left leg in general and left knee in general are slightly larger than the right. There is no warmth or erythema. Lower extremities are neurovascularly intact. Lymphadenopathy:      Cervical: No cervical adenopathy. Skin:     General: Skin is warm and dry. Neurological:      Mental Status: She is alert. MDM  Number of Diagnoses or Management Options  Diagnosis management comments: There is no DVT. Patient cannot tolerate opiates and has recent diagnosis of gastritis with a gastric pouch. NSAIDs contraindicated for this and due to her anticoagulation. Continue Tylenol. Ice to the sore area recommended. She is stopping colchicine at the instruction of her cardiology group. I recommend that she get another ultrasound in 4 weeks if symptoms persist.  I suspect this is musculoskeletal in nature versus sciatica or herniated disc. No signs of cauda equina.        Amount and/or Complexity of Data Reviewed  Clinical lab tests: ordered and reviewed (Results for orders placed or performed during the hospital encounter of 11/01/21  -CBC WITH AUTOMATED DIFF       Result                      Value             Ref Range           WBC                         6.6               4.3 - 11.1 K*       RBC                         4.46              4.05 - 5.2 M*       HGB                         12.6              11.7 - 15.4 *       HCT                         40.2 35.8 - 46.3 %       MCV                         90.1              79.6 - 97.8 *       MCH                         28.3              26.1 - 32.9 *       MCHC                        31.3 (L)          31.4 - 35.0 *       RDW                         14.0              11.9 - 14.6 %       PLATELET                    323               150 - 450 K/*       MPV                         9.3 (L)           9.4 - 12.3 FL       ABSOLUTE NRBC               0.00              0.0 - 0.2 K/*       DF                          AUTOMATED                             NEUTROPHILS                 56                43 - 78 %           LYMPHOCYTES                 30                13 - 44 %           MONOCYTES                   8                 4.0 - 12.0 %        EOSINOPHILS                 3                 0.5 - 7.8 %         BASOPHILS                   1                 0.0 - 2.0 %         IMMATURE GRANULOCYTES       1                 0.0 - 5.0 %         ABS. NEUTROPHILS            3.7               1.7 - 8.2 K/*       ABS. LYMPHOCYTES            2.0               0.5 - 4.6 K/*       ABS. MONOCYTES              0.6               0.1 - 1.3 K/*       ABS. EOSINOPHILS            0.2               0.0 - 0.8 K/*       ABS. BASOPHILS              0.1               0.0 - 0.2 K/*       ABS. IMM.  GRANS.            0.0               0.0 - 0.5 K/*  -METABOLIC PANEL, COMPREHENSIVE       Result                      Value             Ref Range           Sodium                      139               136 - 145 mm*       Potassium                   3.5               3.5 - 5.1 mm*       Chloride                    104               98 - 107 mmo*       CO2                         29                21 - 32 mmol*       Anion gap                   6 (L)             7 - 16 mmol/L       Glucose                     88                65 - 100 mg/*       BUN                         11                8 - 23 MG/DL        Creatinine                  1.10 (H) 0.6 - 1.0 MG*       GFR est AA                  >60               >60 ml/min/1*       GFR est non-AA              52 (L)            >60 ml/min/1*       Calcium                     9.5               8.3 - 10.4 M*       Bilirubin, total            0.5               0.2 - 1.1 MG*       ALT (SGPT)                  22                12 - 65 U/L         AST (SGOT)                  18                15 - 37 U/L         Alk. phosphatase            91                50 - 136 U/L        Protein, total              8.1               6.3 - 8.2 g/*       Albumin                     3.7               3.2 - 4.6 g/*       Globulin                    4.4 (H)           2.3 - 3.5 g/*       A-G Ratio                   0.8 (L)           1.2 - 3.5      -EKG, 12 LEAD, INITIAL       Result                      Value             Ref Range           Ventricular Rate            68                BPM                 Atrial Rate                 70                BPM                 QRS Duration                106               ms                  Q-T Interval                416               ms                  QTC Calculation (Bezet)     442               ms                  Calculated R Axis           -19               degrees             Calculated T Axis           75                degrees             Diagnosis                                                     Atrial fibrillation   Minimal voltage criteria for LVH, may be normal variant   Non-specific ST-t wave changes   Non-specific intra-ventricular conduction delay   When compared with ECG of 25-OCT-2021 11:12,   Inverted T waves have replaced nonspecific T wave abnormality in Lateral    leads   Confirmed by NELSON [id number] [id number] ([id number]) on 11/1/2021 4:21:44 PM     )  Tests in the radiology section of CPT®: ordered and reviewed (DUPLEX LOWER EXT VENOUS LEFT    Result Date: 11/1/2021  LEFT LOWER EXTREMITY VENOUS DUPLEX ULTRASOUND. HISTORY:  Pain.  TECHNIQUE: Direct skin-contact high resolution grayscale images, color-flow Doppler and duplex waveform analysis. FINDINGS: There is compressibility, color-flow assignment and augmentation of the venous waveform with calf compression in the common femoral, superficial femoral and popliteal veins.  Upper calf veins are unremarkable     Negative for sonographic evidence of deep venous thrombosis left lower extremity.     )  Review and summarize past medical records: yes    Risk of Complications, Morbidity, and/or Mortality  Presenting problems: moderate  Diagnostic procedures: low  Management options: low    Patient Progress  Patient progress: stable         Procedures

## 2021-11-02 LAB
H PYLORI IGA SER-ACNC: <9 UNITS (ref 0–8.9)
H PYLORI IGG SER IA-ACNC: 0.46 INDEX VALUE (ref 0–0.79)
H PYLORI IGM SER-ACNC: <9 UNITS (ref 0–8.9)

## 2021-12-10 ENCOUNTER — HOSPITAL ENCOUNTER (OUTPATIENT)
Dept: CARDIAC CATH/INVASIVE PROCEDURES | Age: 71
Discharge: HOME | End: 2021-12-10
Attending: INTERNAL MEDICINE | Admitting: INTERNAL MEDICINE
Payer: MEDICARE

## 2021-12-10 VITALS
BODY MASS INDEX: 37.39 KG/M2 | OXYGEN SATURATION: 99 % | HEIGHT: 63 IN | SYSTOLIC BLOOD PRESSURE: 121 MMHG | WEIGHT: 211 LBS | DIASTOLIC BLOOD PRESSURE: 73 MMHG | HEART RATE: 56 BPM

## 2021-12-10 DIAGNOSIS — I48.91 ATRIAL FIBRILLATION, UNSPECIFIED TYPE (HCC): ICD-10-CM

## 2021-12-10 DIAGNOSIS — I48.21 PERMANENT ATRIAL FIBRILLATION (HCC): ICD-10-CM

## 2021-12-10 LAB
ALBUMIN SERPL-MCNC: 3.2 G/DL (ref 3.2–4.6)
ALBUMIN/GLOB SERPL: 0.8 {RATIO} (ref 1.2–3.5)
ALP SERPL-CCNC: 72 U/L (ref 50–136)
ALT SERPL-CCNC: 18 U/L (ref 12–65)
ANION GAP SERPL CALC-SCNC: 7 MMOL/L (ref 7–16)
AST SERPL-CCNC: 13 U/L (ref 15–37)
BILIRUB SERPL-MCNC: 0.3 MG/DL (ref 0.2–1.1)
BUN SERPL-MCNC: 17 MG/DL (ref 8–23)
CALCIUM SERPL-MCNC: 9.2 MG/DL (ref 8.3–10.4)
CHLORIDE SERPL-SCNC: 109 MMOL/L (ref 98–107)
CO2 SERPL-SCNC: 27 MMOL/L (ref 21–32)
CREAT SERPL-MCNC: 0.93 MG/DL (ref 0.6–1)
ERYTHROCYTE [DISTWIDTH] IN BLOOD BY AUTOMATED COUNT: 13.6 % (ref 11.9–14.6)
GLOBULIN SER CALC-MCNC: 3.8 G/DL (ref 2.3–3.5)
GLUCOSE SERPL-MCNC: 89 MG/DL (ref 65–100)
HCT VFR BLD AUTO: 36.9 % (ref 35.8–46.3)
HGB BLD-MCNC: 11.4 G/DL (ref 11.7–15.4)
MCH RBC QN AUTO: 28.1 PG (ref 26.1–32.9)
MCHC RBC AUTO-ENTMCNC: 30.9 G/DL (ref 31.4–35)
MCV RBC AUTO: 90.9 FL (ref 79.6–97.8)
NRBC # BLD: 0 K/UL (ref 0–0.2)
PLATELET # BLD AUTO: 256 K/UL (ref 150–450)
PMV BLD AUTO: 9.8 FL (ref 9.4–12.3)
POTASSIUM SERPL-SCNC: 3.9 MMOL/L (ref 3.5–5.1)
PROT SERPL-MCNC: 7 G/DL (ref 6.3–8.2)
RBC # BLD AUTO: 4.06 M/UL (ref 4.05–5.2)
SODIUM SERPL-SCNC: 143 MMOL/L (ref 136–145)
WBC # BLD AUTO: 7.3 K/UL (ref 4.3–11.1)

## 2021-12-10 PROCEDURE — 93320 DOPPLER ECHO COMPLETE: CPT | Performed by: INTERNAL MEDICINE

## 2021-12-10 PROCEDURE — 80053 COMPREHEN METABOLIC PANEL: CPT

## 2021-12-10 PROCEDURE — 74011000250 HC RX REV CODE- 250: Performed by: INTERNAL MEDICINE

## 2021-12-10 PROCEDURE — 93312 ECHO TRANSESOPHAGEAL: CPT | Performed by: INTERNAL MEDICINE

## 2021-12-10 PROCEDURE — 74011250636 HC RX REV CODE- 250/636: Performed by: INTERNAL MEDICINE

## 2021-12-10 PROCEDURE — 99152 MOD SED SAME PHYS/QHP 5/>YRS: CPT

## 2021-12-10 PROCEDURE — 93325 DOPPLER ECHO COLOR FLOW MAPG: CPT | Performed by: INTERNAL MEDICINE

## 2021-12-10 PROCEDURE — 99152 MOD SED SAME PHYS/QHP 5/>YRS: CPT | Performed by: INTERNAL MEDICINE

## 2021-12-10 PROCEDURE — 93312 ECHO TRANSESOPHAGEAL: CPT

## 2021-12-10 PROCEDURE — 85027 COMPLETE CBC AUTOMATED: CPT

## 2021-12-10 RX ORDER — MIDAZOLAM HYDROCHLORIDE 1 MG/ML
.5-2 INJECTION, SOLUTION INTRAMUSCULAR; INTRAVENOUS
Status: DISCONTINUED | OUTPATIENT
Start: 2021-12-10 | End: 2021-12-10 | Stop reason: HOSPADM

## 2021-12-10 RX ORDER — FENTANYL CITRATE 50 UG/ML
25-50 INJECTION, SOLUTION INTRAMUSCULAR; INTRAVENOUS
Status: DISCONTINUED | OUTPATIENT
Start: 2021-12-10 | End: 2021-12-10 | Stop reason: HOSPADM

## 2021-12-10 RX ORDER — SODIUM CHLORIDE 9 MG/ML
75 INJECTION, SOLUTION INTRAVENOUS CONTINUOUS
Status: DISCONTINUED | OUTPATIENT
Start: 2021-12-10 | End: 2021-12-10 | Stop reason: HOSPADM

## 2021-12-10 RX ORDER — LIDOCAINE HYDROCHLORIDE 20 MG/ML
15 SOLUTION OROPHARYNGEAL AS NEEDED
Status: DISCONTINUED | OUTPATIENT
Start: 2021-12-10 | End: 2021-12-10 | Stop reason: HOSPADM

## 2021-12-10 RX ORDER — CLOPIDOGREL BISULFATE 75 MG/1
75 TABLET ORAL DAILY
Qty: 30 TABLET | Refills: 6 | Status: SHIPPED | OUTPATIENT
Start: 2021-12-10

## 2021-12-10 RX ADMIN — MIDAZOLAM 2 MG: 1 INJECTION INTRAMUSCULAR; INTRAVENOUS at 11:39

## 2021-12-10 RX ADMIN — MIDAZOLAM 2 MG: 1 INJECTION INTRAMUSCULAR; INTRAVENOUS at 11:35

## 2021-12-10 RX ADMIN — FENTANYL CITRATE 25 MCG: 50 INJECTION INTRAMUSCULAR; INTRAVENOUS at 11:37

## 2021-12-10 RX ADMIN — LIDOCAINE HYDROCHLORIDE 15 ML: 20 SOLUTION ORAL; TOPICAL at 11:22

## 2021-12-10 RX ADMIN — FENTANYL CITRATE 50 MCG: 50 INJECTION INTRAMUSCULAR; INTRAVENOUS at 11:36

## 2021-12-10 RX ADMIN — MIDAZOLAM 2 MG: 1 INJECTION INTRAMUSCULAR; INTRAVENOUS at 11:37

## 2021-12-10 NOTE — PROGRESS NOTES
Discharge instructions reviewed with patient including post PREETHI care. Patient instructed to stop Eliquis and start Plavix and ASA per Watch,[facility] protocol. INT removed with cath intact.

## 2021-12-10 NOTE — DISCHARGE INSTRUCTIONS
AFTER YOU TRANSESOPHAGEAL ECHOCARDIOGRAM    Be sure someone else drives you home. You may feel drowsy for several hours. Do not eat or drink for at least two hours after your procedure. Your throat will be numb and there is a risk you might have difficulty swallowing for a while. Be careful when you do eat or drink for the first time especially with hot fluids since you could easily burn your throat. Call your doctor if:    · You are bleeding from your throat or mouth. · You have trouble breathing all of a sudden. · You have chest pain or any pain that spreads to your neck, jaw, or arms. · You have questions or concerns. · You have a fever greater than 101°F.    Doctor: Taras Webb    Special Instructions:    No driving for 24 hours.

## 2021-12-10 NOTE — PROGRESS NOTES
Patient received to [address] [place] [address] room # 17  Ambulatory from [facility]. Patient scheduled for PREETHI today with Dr Homar Rutledge. Procedure reviewed & questions answered, voiced good understanding consent obtained & placed on chart. All medications and medical history reviewed. Will prep patient per orders. Patient & family updated on plan of care. The patient has a fraility score of 3-MANAGING WELL, based on ability to perform ADLs.

## 2022-03-18 PROBLEM — R06.83 SNORING: Status: ACTIVE | Noted: 2018-11-07

## 2022-03-18 PROBLEM — I42.8 NICM (NONISCHEMIC CARDIOMYOPATHY) (HCC): Status: ACTIVE | Noted: 2017-07-13

## 2022-03-18 PROBLEM — N30.90 CYSTITIS: Status: ACTIVE | Noted: 2021-10-27

## 2022-03-18 PROBLEM — I27.20 PULMONARY HTN (HCC): Status: ACTIVE | Noted: 2017-09-18

## 2022-03-19 PROBLEM — I51.81 TAKOTSUBO CARDIOMYOPATHY: Status: ACTIVE | Noted: 2017-05-30

## 2022-03-19 PROBLEM — I48.21 PERMANENT ATRIAL FIBRILLATION (HCC): Status: ACTIVE | Noted: 2021-10-19

## 2022-03-19 PROBLEM — R07.9 CHEST PAIN: Status: ACTIVE | Noted: 2021-10-25

## 2022-03-19 PROBLEM — E66.01 SEVERE OBESITY (HCC): Status: ACTIVE | Noted: 2018-11-07

## 2022-03-20 PROBLEM — K58.9 IBS (IRRITABLE BOWEL SYNDROME): Status: ACTIVE | Noted: 2021-10-27

## 2022-04-04 ENCOUNTER — HOSPITAL ENCOUNTER (OUTPATIENT)
Dept: PHYSICAL THERAPY | Age: 72
Discharge: HOME | End: 2022-04-04
Payer: MEDICARE

## 2022-04-04 PROCEDURE — 97166 OT EVAL MOD COMPLEX 45 MIN: CPT

## 2022-04-04 PROCEDURE — 97535 SELF CARE MNGMENT TRAINING: CPT

## 2022-04-04 NOTE — THERAPY EVALUATION
Ketan Anthony Irina  : 1950  [place]  [place] [address] at [address] [facility]  [address], [address]  [phone]   [phone]           OUTPATIENT OCCUPATIONAL THERAPY: Initial Assessment and Daily Note 2022    ICD-10: Treatment Diagnosis: Q82.0 hereditary or primary lymphedema, I89.0 lymphedema not elsewhere classified, M79.609 pain in limbs   Precautions/Allergies:   Latex, Other food, Atorvastatin, Azo pms [black cohosh-chaste-am.ginseng], Codeine, Colchicine, Hydrocodone, Nickel, Oxycodone, Phenazopyridine, Protonix [pantoprazole], and Statins-hmg-coa reductase inhibitors   Fall Risk Score:    Ambulatory/Rehab Services H2 Model Falls Risk Assessment    Risk Factors:       No Risk Factors Identified Ability to Rise from Chair:       (1)  Pushes up, successful in one attempt    Falls Prevention Plan:       No modifications necessary   Total: (5 or greater = High Risk): 1     [facility]. All Rights Reserved. [facility] Patent #[id number]. Federal Law prohibits the replication, distribution or use without written permission from [employer]     MD Orders: eval and treat MEDICAL/REFERRING DIAGNOSIS:   Lymphedema, not elsewhere classified [I89.0]   DATE OF ONSET: chronic  REFERRING PHYSICIAN: Gracia Wiggins MD  RETURN PHYSICIAN APPOINTMENT: to be determined      INITIAL ASSESSMENT:  Ms. Mahsa Yee was referred to OT for the evaluation and treatment of bilateral LE lipolymphedema with greater involvement in the LLE. Contributing to LE swelling is a genetic and family (mother, sister, grandmother) history as well as multiple comorbidities including morbid obesity, cardiac issues and CHF. She states swelling has been present in some degree since the birth of her first child in [birth year] at the age of 30.  Swelling in the LLE has gotten progressively worse over the last year with foot and toes now being involved. At times her left foot is so large she can not wear a shoe. Swelling is worse by the end of the day and is historically worse in the summer and with travel. LE's are tender to the touch. She has tried multiple compression garments over the years and is unable to tolerate due to an allergic reaction to the fabric. She watches her salt intake and elevates her legs during the day. She presents with stage 1-2 lipolymphedema LLE>RLE, LE tenderness/pain with tourniquet appearance around ankles bilaterally. She would benefit from skilled OT for complete decongestive therapy to decrease LE swelling and explore alternate compression options. PLAN OF CARE:   PROBLEM LIST:  1. Increased Pain  2. Decreased Activity Tolerance  3. Edema/Girth  4. Decreased Knowledge of Precautions  5. Decreased Skin Integrity/Hygeine  6. Decreased [place] with Home Exercise Program INTERVENTIONS PLANNED  1. Skin care  2. Compression bandaging  3. Fitting for compression garment(s)  4. Manual therapy/Manual lymph drainage  5. Therapeutic exercise/Therapeutic activities  6. Patient Education  7. Compression pump trial prn  8.  kinesiotaping    TREATMENT PLAN:  Effective Dates: 4/4/2022 to 7/3/2022 Frequency/Duration: 2x/week up to 90 days  2 xweek x90 days  and upon reassessment. Will adjust frequency and duration as progress indicates. GOALS: (Goals have been discussed and agreed upon with patient.)  Short-Term Functional Goals: Time Frame: 45 days  1. The patient/caregiver will verbalize understanding of lymphedema precautions. 2. Patient will be independent with skin care regimen to decrease risk of cellulitis. 3. The patient/caregiver will be independent with self-manual lymph drainage techniques and show decrease in limb volume. 4. Patient will be independent in lymphatic exercises. Discharge Goals: Time Frame: 90 days  1.  Patient's bilateral lower extremity circumferential measurements will decrease on volumetric graph by 3-5cm to maximize functional use in ADL's.    2. The patient/caregiver will be independent with home management of lymphedema. 3. Patient/caregiver will be independent donning and doffing bilateral lower extremity compression garment. Rehabilitation Potential For Stated Goals: Good  Regarding Hollis Arevalo's therapy, I certify that the treatment plan above will be carried out by a therapist or under their direction. Thank you for this referral,  Aicha Garibay, OTR/L, CLT   Referring Physician Signature: Marjan Reeves MD _________________________  Date _________            The information in this section was collected on 4/4/2022   (except where otherwise noted). OCCUPATIONAL PROFILE & HISTORY:   History of Present Injury/Illness (Reason for Referral):  Ms. Addison Cobb was referred to OT for the evaluation and treatment of bilateral LE lipolymphedema with greater involvement in the LLE. Contributing to LE swelling is a genetic and family (mother, sister, grandmother) history as well as multiple comorbidities including morbid obesity, cardiac issues and CHF. She states swelling has been present in some degree since the birth of her first child in [birth year] at the age of 30. Swelling in the LLE has gotten progressively worse over the last year with foot and toes now being involved. At times her left foot is so large she can not wear a shoe. Swelling is worse by the end of the day and is historically worse in the summer and with travel. LE's are tender to the touch. She has tried multiple compression garments over the years and is unable to tolerate due to an allergic reaction to the fabric. She watches her salt intake and elevates her legs during the day. She presents with stage 1-2 lipolymphedema LLE>RLE, LE tenderness/pain with tourniquet appearance around ankles bilaterally.   She would benefit from skilled OT for complete decongestive therapy to decrease LE swelling and explore alternate compression options. Past Medical History/Comorbidities:   Ms. Juan Diego Orlando  has a past medical history of Arrhythmia, Arthritis, CAD (coronary artery disease), Cancer ([address] Congestive heart failure ([address] Essential hypertension, GERD (gastroesophageal reflux disease), Hyperlipidemia, Morbid obesity ([address] Nausea & vomiting, Sleep apnea, and Vertigo. She has no past medical history of Aneurysm ([address] Asthma, Autoimmune disease ([address] Chronic kidney disease, Chronic obstructive pulmonary disease ([address] Chronic pain, Coagulation disorder ([address] Diabetes ([address] Endocarditis, Liver disease, Psychiatric disorder, PUD (peptic ulcer disease), Rheumatic fever, Seizures ([address] Stroke ([address] Thromboembolus ([address] or Thyroid disease. Ms. Juan Diego Orlando  has a past surgical history that includes hx gastric bypass; hx hysterectomy; hx cholecystectomy; hx tonsillectomy; hx orthopaedic; and hx orthopaedic. Social History/Living Environment:     Pt lives in one level home with spouse   Prior Level of Function/Work/Activity:  Retired teacher   Dominant Side:         RIGHT  Previous Treatment Approaches:          Various compression garments, Lasix - no therapy for lymphedema to date   Current Medications:    Current Outpatient Medications:     omeprazole (PRILOSEC) 20 mg capsule, TAKE ONE CAPSULE BY MOUTH EVERY OTHER DAY, Disp: , Rfl:     clopidogreL (PLAVIX) 75 mg tab, Take 1 Tablet by mouth daily. , Disp: 30 Tablet, Rfl: 6    buPROPion XL (WELLBUTRIN XL) 300 mg XL tablet, Take 300 mg by mouth daily. , Disp: , Rfl:     naltrexone (DEPADE) 50 mg tablet, Take 25 mg by mouth two (2) times a day., Disp: , Rfl:     metoprolol succinate (TOPROL-XL) 25 mg XL tablet, Take 0.5 Tablets by mouth daily. 1/2 qd, Disp: 90 Tablet, Rfl: 3    aspirin delayed-release 81 mg tablet, Take 1 Tablet by mouth daily. , Disp: 30 Tablet, Rfl: 0    nabumetone (RELAFEN) 750 mg tablet, Take 750 mg by mouth as needed. , Disp: , Rfl:     furosemide (Lasix) 40 mg tablet, Take 1 Tablet by mouth daily. (Patient taking differently: Take 40 mg by mouth as needed.), Disp: 90 Tablet, Rfl: 3    potassium chloride SR (KLOR-CON 10) 10 mEq tablet, Take 1 Tablet by mouth daily. (Patient taking differently: Take 10 mEq by mouth as needed.), Disp: 90 Tablet, Rfl: 3    nitroglycerin (NITROSTAT) 0.4 mg SL tablet, 1 Tablet by SubLINGual route every five (5) minutes as needed for Chest Pain., Disp: 25 Tablet, Rfl: 11    polyethylene glycol (Miralax) 17 gram/dose powder, Take 17 g by mouth daily. , Disp: , Rfl:     multivitamin (ONE A DAY) tablet, Take 1 Tab by mouth daily. , Disp: , Rfl:     estradiol (ESTRACE) 2 mg tablet, Take 2 mg by mouth daily. , Disp: , Rfl:     fluticasone (FLONASE) 50 mcg/actuation nasal spray, 2 Sprays by Both Nostrils route as needed. , Disp: , Rfl:     montelukast (SINGULAIR) 10 mg tablet, Take 10 mg by mouth daily. , Disp: , Rfl:             Date Last Reviewed:  4/4/2022   Complexity Level : Expanded review of therapy/medical records (1-2):  MODERATE COMPLEXITY   ASSESSMENT OF OCCUPATIONAL PERFORMANCE:   Palpation:          Stage 1 RLE with soft, pitting fluid, improves with elevation        Stage 1-2 LLE with dense pitting/nonpitting fluid/hardening of the tissue, little to no improvement with elevation - tender to touch, dorsal hump, large accumulation of fluid around ankle   ROM:          WFL  Strength:          WFL  Special Tests:          Stemmer sign positive LLE, negative RLE  Skin Integrity:          Skin is intact with skin dryness present  Sensation:  Intact   Functional Mobility:  Independent with decreased activity tolerance   Activities of Daily Living :independent   Edema/Girth:  1+ and 2+   PRETREATMENT AFFECTED LIMB(s): right lower extremity  left lower extremity      Date:  4-4-22         Right / Left           Groin   []      []           8 inches   []      []           4 inches   []      []         PoplitealSpace   [x]      [x] 42.5/48          8 inches   [x]      [x] 43/43.5          4 inches   [x]      [x] 33.5/34          Ankle   [x]      [x] 27.5/30.5          Instep   [x]      [x] 22.5/23        Measurements are taken in centimeters:  2.54 cm = 1 inch  Total:right 169cm        left 179cm              Physical Skills Involved:  1. Activity Tolerance  2. Edema  3. Skin Integrity  4. pain Cognitive Skills Affected (resulting in the inability to perform in a timely and safe manner):  1. Psychosocial Skills Affected:  1. Habits/Routines   Number of elements that affect the Plan of Care: 3-5:  MODERATE COMPLEXITY   CLINICAL DECISION MAKING:   Outcome Measure: Tool Used: Tool Used: Lymphedema Life Impact Scale   Score:  Initial: 44 Most Recent: X (Date: -- )   Interpretation of Score: The Lymphedema Life Impact Scale (LLIS) is a validated instrument that measures the physical, functional, and psychosocial concerns pertinent to patients with extremity lymphedema. The Scale's questionnaire is administered to patients to gauge impairments, activity limitations, and participation restrictions resulting from their lymphedema. Score 0 1-13 14-26 27-40 41-54 55-67 68   Modifier CH CI CJ CK CL CM CN     ? Other PT/OT Primary Functional Limitations:     - CURRENT STATUS: CL - 60%-79% impaired, limited or restricted    - GOAL STATUS: CK - 40%-59% impaired, limited or restricted    - D/C STATUS:  ---------------To be determined---------------       Medical Necessity:   · Skilled intervention continues to be required due to uncontrolled swelling increasing risk of cellulitis and hindering ADL's. Reason for Services/Other Comments:  · Patient continues to require skilled intervention due to inability to self manage lymphedema at this time.   Clinical Decision-Making Assessment:     Use of outcome tool(s) and clinical judgement create a POC that gives a: Questionable prediction of patient's progress: MODERATE COMPLEXITY   TREATMENT: (In addition to Assessment/Re-Assessment sessions the following treatments were rendered)    Pre-treatment Symptoms/Complaints:  Bilateral LE lipolymphedema , LLE>RLE  Pain: Initial:   Pain Intensity 1: 8  Post Session:  8   Occupational Therapy Treatments:    OT eval( x ) OT eval was completed 4-4-22. Pt received information on lymphedema and risk reduction/self management practices as outlined by the National Lymphedema Network. Therapeutic Exercise ( minutes):     HEP:  As above; handouts given to patient for all exercises. Manual Therapy:(minutes)          Manual Lymph Drainage:          Lymph Nodes:    Cervical Supraclavicular Axillary Abdominal Inguinal Popliteal Antecubital   RIGHT []     []     []     []     []     []     []       LEFT []     []     []     []     []     []     []          Anastamoses:   Axillo-axillary Inguino-inguinal Axillo-inguinal Inguino-axillary   ANTERIOR []     []     []     []       POSTERIOR []     []     []     []       RIGHT []     []     []     []       LEFT []     []     []     []         Limbs:   []    RUE     []    LUE     []    RLE    []    LLE   ADL/Self Care:(15min): Pt was educated on lymph pathology, CDT, precautions as outlined by the NLN, skin integrity management, exercises and elevation. Using handout, she was educated on deep breathing and LE exercises to perform daily to promote lymph flow. Treatment/Session Assessment:    · Response to Treatment:  Pt's goal for therapy is to [facility] a normal leg that does not hurt\". She agrees with POC. · Compliance with Program/Exercises: Will assess as treatment progresses. · Recommendations/Intent for next treatment session: \"Next visit will focus on complete decongestive therapy - reduction phase \".   Total Treatment Duration: 50min  OT Patient Time In/Time Out  Time In: 1100  Time Out: [address] Chen/L, CLT

## 2022-04-11 ENCOUNTER — HOSPITAL ENCOUNTER (OUTPATIENT)
Dept: PHYSICAL THERAPY | Age: 72
Discharge: HOME | End: 2022-04-11
Payer: MEDICARE

## 2022-04-11 NOTE — PROGRESS NOTES
Kandis Nieto Irina  : 1950  [place]  Secondary: [facility] at [address] [facility] [address] [place], [place], [facility]  [phone]   [phone]        OUTPATIENT DAILY NOTE    NAME/AGE/GENDER: Krishan Berger is a 74 y.o. female. DATE: 2022    Ms. Arevalo arrived at the incorrect appointment time and was unable to stay for her scheduled time. She left without being treated.     Librado Giordano, OT

## 2022-04-14 ENCOUNTER — HOSPITAL ENCOUNTER (OUTPATIENT)
Dept: PHYSICAL THERAPY | Age: 72
Discharge: HOME | End: 2022-04-14
Payer: MEDICARE

## 2022-04-14 PROCEDURE — 97140 MANUAL THERAPY 1/> REGIONS: CPT

## 2022-04-14 PROCEDURE — 97535 SELF CARE MNGMENT TRAINING: CPT

## 2022-04-14 NOTE — PROGRESS NOTES
Hollis Thompson Irina  : 1950  [place]  [place] [address] at [facility]  [address], [facility], [address]  [phone]   [phone]           OUTPATIENT OCCUPATIONAL THERAPY: Daily Note 2022    ICD-10: Treatment Diagnosis: Q82.0 hereditary or primary lymphedema, I89.0 lymphedema not elsewhere classified, M79.609 pain in limbs   Precautions/Allergies:   Latex, Other food, Atorvastatin, Azo pms [black cohosh-chaste-am.ginseng], Codeine, Colchicine, Hydrocodone, Nickel, Oxycodone, Phenazopyridine, Protonix [pantoprazole], and Statins-hmg-coa reductase inhibitors   Fall Risk Score:    Ambulatory/Rehab Services H2 Model Falls Risk Assessment    Risk Factors:       No Risk Factors Identified Ability to Rise from Chair:       (1)  Pushes up, successful in one attempt    Falls Prevention Plan:       No modifications necessary   Total: (5 or greater = High Risk): 1     [facility] [address] [place] [address] [place] Patent #[id number]. Federal Law prohibits the replication, distribution or use without written permission from [employer]     MD Orders: eval and treat MEDICAL/REFERRING DIAGNOSIS:   Lymphedema, not elsewhere classified [I89.0]   DATE OF ONSET: chronic  REFERRING PHYSICIAN: Marjan Reeves MD  RETURN PHYSICIAN APPOINTMENT: to be determined      INITIAL ASSESSMENT:  Ms. Addison Cobb was referred to OT for the evaluation and treatment of bilateral LE lipolymphedema with greater involvement in the LLE. Contributing to LE swelling is a genetic and family (mother, sister, grandmother) history as well as multiple comorbidities including morbid obesity, cardiac issues and CHF. She states swelling has been present in some degree since the birth of her first child in 65 at the age of 30.  Swelling in the LLE has gotten progressively worse over the last year with foot and toes now being involved. At times her left foot is so large she can not wear a shoe. Swelling is worse by the end of the day and is historically worse in the summer and with travel. LE's are tender to the touch. She has tried multiple compression garments over the years and is unable to tolerate due to an allergic reaction to the fabric. She watches her salt intake and elevates her legs during the day. She presents with stage 1-2 lipolymphedema LLE>RLE, LE tenderness/pain with tourniquet appearance around ankles bilaterally. She would benefit from skilled OT for complete decongestive therapy to decrease LE swelling and explore alternate compression options. PLAN OF CARE:   PROBLEM LIST:  1. Increased Pain  2. Decreased Activity Tolerance  3. Edema/Girth  4. Decreased Knowledge of Precautions  5. Decreased Skin Integrity/Hygeine  6. Decreased Loving with Home Exercise Program INTERVENTIONS PLANNED  1. Skin care  2. Compression bandaging  3. Fitting for compression garment(s)  4. Manual therapy/Manual lymph drainage  5. Therapeutic exercise/Therapeutic activities  6. Patient Education  7. Compression pump trial prn  8.  kinesiotaping    TREATMENT PLAN:  Effective Dates: 4/4/2022 to 7/3/2022 Frequency/Duration: 2x/week up to 90 days  2 xweek x90 days  and upon reassessment. Will adjust frequency and duration as progress indicates. GOALS: (Goals have been discussed and agreed upon with patient.)  Short-Term Functional Goals: Time Frame: 45 days  1. The patient/caregiver will verbalize understanding of lymphedema precautions. 2. Patient will be independent with skin care regimen to decrease risk of cellulitis. 3. The patient/caregiver will be independent with self-manual lymph drainage techniques and show decrease in limb volume. 4. Patient will be independent in lymphatic exercises. Discharge Goals: Time Frame: 90 days  1.  Patient's bilateral lower extremity circumferential measurements will decrease on volumetric graph by 3-5cm to maximize functional use in ADL's.    2. The patient/caregiver will be independent with home management of lymphedema. 3. Patient/caregiver will be independent donning and doffing bilateral lower extremity compression garment. Rehabilitation Potential For Stated Goals: Good              The information in this section was collected on 4/14/2022   (except where otherwise noted). OCCUPATIONAL PROFILE & HISTORY:   History of Present Injury/Illness (Reason for Referral):  Ms. Juan Diego Orlando was referred to OT for the evaluation and treatment of bilateral LE lipolymphedema with greater involvement in the LLE. Contributing to LE swelling is a genetic and family (mother, sister, grandmother) history as well as multiple comorbidities including morbid obesity, cardiac issues and CHF. She states swelling has been present in some degree since the birth of her first child in 65 at the age of 30. Swelling in the LLE has gotten progressively worse over the last year with foot and toes now being involved. At times her left foot is so large she can not wear a shoe. Swelling is worse by the end of the day and is historically worse in the summer and with travel. LE's are tender to the touch. She has tried multiple compression garments over the years and is unable to tolerate due to an allergic reaction to the fabric. She watches her salt intake and elevates her legs during the day. She presents with stage 1-2 lipolymphedema LLE>RLE, LE tenderness/pain with tourniquet appearance around ankles bilaterally. She would benefit from skilled OT for complete decongestive therapy to decrease LE swelling and explore alternate compression options.    Past Medical History/Comorbidities:   Ms. Juan Diego Orlando  has a past medical history of Arrhythmia, Arthritis, CAD (coronary artery disease), Cancer ([address] Congestive heart failure ([address] Essential hypertension, GERD (gastroesophageal reflux disease), Hyperlipidemia, Morbid obesity ([address] Nausea & vomiting, Sleep apnea, and Vertigo. She has no past medical history of Aneurysm ([address] Asthma, Autoimmune disease ([address] Chronic kidney disease, Chronic obstructive pulmonary disease ([address] Chronic pain, Coagulation disorder ([address] Diabetes ([address] Endocarditis, Liver disease, Psychiatric disorder, PUD (peptic ulcer disease), Rheumatic fever, Seizures ([address] Stroke ([address] Thromboembolus ([address] or Thyroid disease. Ms. Daysi Willis  has a past surgical history that includes hx gastric bypass; hx hysterectomy; hx cholecystectomy; hx tonsillectomy; hx orthopaedic; and hx orthopaedic. Social History/Living Environment:     Pt lives in one level home with spouse   Prior Level of Function/Work/Activity:  Retired teacher   Dominant Side:         RIGHT  Previous Treatment Approaches:          Various compression garments, Lasix - no therapy for lymphedema to date   Current Medications:    Current Outpatient Medications:     omeprazole (PRILOSEC) 20 mg capsule, TAKE ONE CAPSULE BY MOUTH EVERY OTHER DAY, Disp: , Rfl:     clopidogreL (PLAVIX) 75 mg tab, Take 1 Tablet by mouth daily. , Disp: 30 Tablet, Rfl: 6    buPROPion XL (WELLBUTRIN XL) 300 mg XL tablet, Take 300 mg by mouth daily. , Disp: , Rfl:     naltrexone (DEPADE) 50 mg tablet, Take 25 mg by mouth two (2) times a day., Disp: , Rfl:     metoprolol succinate (TOPROL-XL) 25 mg XL tablet, Take 0.5 Tablets by mouth daily. 1/2 qd, Disp: 90 Tablet, Rfl: 3    aspirin delayed-release 81 mg tablet, Take 1 Tablet by mouth daily. , Disp: 30 Tablet, Rfl: 0    nabumetone (RELAFEN) 750 mg tablet, Take 750 mg by mouth as needed. , Disp: , Rfl:     furosemide (Lasix) 40 mg tablet, Take 1 Tablet by mouth daily. (Patient taking differently: Take 40 mg by mouth as needed.), Disp: 90 Tablet, Rfl: 3    potassium chloride SR (KLOR-CON 10) 10 mEq tablet, Take 1 Tablet by mouth daily.  (Patient taking differently: Take 10 mEq by mouth as needed.), Disp: 90 Tablet, Rfl: 3    nitroglycerin (NITROSTAT) 0.4 mg SL tablet, 1 Tablet by SubLINGual route every five (5) minutes as needed for Chest Pain., Disp: 25 Tablet, Rfl: 11    polyethylene glycol (Miralax) 17 gram/dose powder, Take 17 g by mouth daily. , Disp: , Rfl:     multivitamin (ONE A DAY) tablet, Take 1 Tab by mouth daily. , Disp: , Rfl:     estradiol (ESTRACE) 2 mg tablet, Take 2 mg by mouth daily. , Disp: , Rfl:     fluticasone (FLONASE) 50 mcg/actuation nasal spray, 2 Sprays by Both Nostrils route as needed. , Disp: , Rfl:     montelukast (SINGULAIR) 10 mg tablet, Take 10 mg by mouth daily. , Disp: , Rfl:             Date Last Reviewed:  4/14/2022   Complexity Level : Expanded review of therapy/medical records (1-2):  MODERATE COMPLEXITY   ASSESSMENT OF OCCUPATIONAL PERFORMANCE:   Palpation:          Stage 1 RLE with soft, pitting fluid, improves with elevation        Stage 1-2 LLE with dense pitting/nonpitting fluid/hardening of the tissue, little to no improvement with elevation - tender to touch, dorsal hump, large accumulation of fluid around ankle   ROM:          WFL  Strength:          WFL  Special Tests:          Stemmer sign positive LLE, negative RLE  Skin Integrity:          Skin is intact with skin dryness present  Sensation:  Intact   Functional Mobility:  Independent with decreased activity tolerance   Activities of Daily Living :independent   Edema/Girth:  1+ and 2+   PRETREATMENT AFFECTED LIMB(s): right lower extremity  left lower extremity      Date:  4-4-22         Right / Left           Groin   []      []           8 inches   []      []           4 inches   []      []         PoplitealSpace   [x]      [x] 42.5/48          8 inches   [x]      [x] 43/43.5          4 inches   [x]      [x] 33.5/34          Ankle   [x]      [x] 27.5/30.5          Instep   [x]      [x] 22.5/23        Measurements are taken in centimeters:  2.54 cm = 1 inch  Total:right 169cm        left 179cm              Physical Skills Involved:  1. Activity Tolerance  2. Edema  3. Skin Integrity  4. pain Cognitive Skills Affected (resulting in the inability to perform in a timely and safe manner):  1. Psychosocial Skills Affected:  1. Habits/Routines   Number of elements that affect the Plan of Care: 3-5:  MODERATE COMPLEXITY   CLINICAL DECISION MAKING:   Outcome Measure: Tool Used: Tool Used: Lymphedema Life Impact Scale   Score:  Initial: 44 Most Recent: X (Date: -- )   Interpretation of Score: The Lymphedema Life Impact Scale (LLIS) is a validated instrument that measures the physical, functional, and psychosocial concerns pertinent to patients with extremity lymphedema. The Scale's questionnaire is administered to patients to gauge impairments, activity limitations, and participation restrictions resulting from their lymphedema. Score 0 1-13 14-26 27-40 41-54 55-67 68   Modifier CH CI CJ CK CL CM CN     ? Other PT/OT Primary Functional Limitations:     - CURRENT STATUS: CL - 60%-79% impaired, limited or restricted    - GOAL STATUS: CK - 40%-59% impaired, limited or restricted    - D/C STATUS:  ---------------To be determined---------------       Medical Necessity:   · Skilled intervention continues to be required due to uncontrolled swelling increasing risk of cellulitis and hindering ADL's. Reason for Services/Other Comments:  · Patient continues to require skilled intervention due to inability to self manage lymphedema at this time. Clinical Decision-Making Assessment:     Use of outcome tool(s) and clinical judgement create a POC that gives a: Questionable prediction of patient's progress: MODERATE COMPLEXITY   TREATMENT:   (In addition to Assessment/Re-Assessment sessions the following treatments were rendered)    Pre-treatment Symptoms/Complaints:  Pt reports she had an itching and redness reaction to [address]. She has multiple skin sensitivities.   Pain: Initial:   Pain Intensity 1: 2  Post Session:  2 Occupational Therapy Treatments:    Therapeutic Exercise ( minutes):     HEP:  As above; handouts given to patient for all exercises. Manual Therapy:(40 minutes)  Manual lymphatic drainage of trunk and bilateral lower extremities; diaphragmatic breathing to facilitate pumping of the thoracic duct. Application of Eucerin lotion. Manual Lymph Drainage:          Lymph Nodes:    Cervical Supraclavicular Axillary Abdominal Inguinal Popliteal Antecubital   RIGHT [x]     [x]     [x]     [x]     [x]     [x]     []       LEFT [x]     [x]     [x]     [x]     [x]     [x]     []          Anastamoses:   Axillo-axillary Inguino-inguinal Axillo-inguinal Inguino-axillary   ANTERIOR []     []     []     [x]       POSTERIOR []     []     []     []       RIGHT []     []     []     [x]       LEFT []     []     []     [x]         Limbs:   []    RUE     []    LUE     [x]    RLE    [x]    LLE   ADL/Self Care:(15min): B LE multilayer compression bandaging using cotton stockinet and 1 short stretch bandage. Pt will wear as tolerated; encouraged to wear until next treatment session with removal prior to session in order to shower. Treatment/Session Assessment:    · Response to Treatment:  Pt tolerated without complication. Weary of compression due to multiple skin allergies. · Compliance with Program/Exercises: Will assess as treatment progresses. · Recommendations/Intent for next treatment session: \"Next visit will focus on complete decongestive therapy - reduction phase \".   Total Treatment Duration: 55min  OT Patient Time In/Time Out  Time In: 1235  Time Out: 0130    YUVAL Guardado/GILL, DONNAT

## 2022-04-18 ENCOUNTER — HOSPITAL ENCOUNTER (OUTPATIENT)
Dept: PHYSICAL THERAPY | Age: 72
Discharge: HOME | End: 2022-04-18
Payer: MEDICARE

## 2022-04-18 PROCEDURE — 97535 SELF CARE MNGMENT TRAINING: CPT

## 2022-04-18 PROCEDURE — 97140 MANUAL THERAPY 1/> REGIONS: CPT

## 2022-04-18 NOTE — PROGRESS NOTES
Kelsey Keita [facility]  : 1950  [place]  [place] [address] at [facility] Therapy  [address], [facility], [address]  [phone]   [phone]           OUTPATIENT OCCUPATIONAL THERAPY: Daily Note 2022    ICD-10: Treatment Diagnosis: Q82.0 hereditary or primary lymphedema, I89.0 lymphedema not elsewhere classified, M79.609 pain in limbs   Precautions/Allergies:   Latex, Other food, Atorvastatin, Azo pms [black cohosh-chaste-am.ginseng], Codeine, Colchicine, Hydrocodone, Nickel, Oxycodone, Phenazopyridine, Protonix [pantoprazole], and Statins-hmg-coa reductase inhibitors   Fall Risk Score:    Ambulatory/Rehab Services H2 Model Falls Risk Assessment    Risk Factors:       No Risk Factors Identified Ability to Rise from Chair:       (1)  Pushes up, successful in one attempt    Falls Prevention Plan:       No modifications necessary   Total: (5 or greater = High Risk): 1     [facility] [employer]. All Rights Reserved. [facility] Patent #[id number]. Federal Law prohibits the replication, distribution or use without written permission from [employer]     MD Orders: eval and treat MEDICAL/REFERRING DIAGNOSIS:   Lymphedema, not elsewhere classified [I89.0]   DATE OF ONSET: chronic  REFERRING PHYSICIAN: Francis Lanes, MD  RETURN PHYSICIAN APPOINTMENT: to be determined      INITIAL ASSESSMENT:  Ms. Alvan Ahumada was referred to OT for the evaluation and treatment of bilateral LE lipolymphedema with greater involvement in the LLE. Contributing to LE swelling is a genetic and family (mother, sister, grandmother) history as well as multiple comorbidities including morbid obesity, cardiac issues and CHF. She states swelling has been present in some degree since the birth of her first child in 65 at the age of 30.  Swelling in the LLE has gotten progressively worse over the last year with foot and toes now being involved. At times her left foot is so large she can not wear a shoe. Swelling is worse by the end of the day and is historically worse in the summer and with travel. LE's are tender to the touch. She has tried multiple compression garments over the years and is unable to tolerate due to an allergic reaction to the fabric. She watches her salt intake and elevates her legs during the day. She presents with stage 1-2 lipolymphedema LLE>RLE, LE tenderness/pain with tourniquet appearance around ankles bilaterally. She would benefit from skilled OT for complete decongestive therapy to decrease LE swelling and explore alternate compression options. PLAN OF CARE:   PROBLEM LIST:  1. Increased Pain  2. Decreased Activity Tolerance  3. Edema/Girth  4. Decreased Knowledge of Precautions  5. Decreased Skin Integrity/Hygeine  6. Decreased [place] with Home Exercise Program INTERVENTIONS PLANNED  1. Skin care  2. Compression bandaging  3. Fitting for compression garment(s)  4. Manual therapy/Manual lymph drainage  5. Therapeutic exercise/Therapeutic activities  6. Patient Education  7. Compression pump trial prn  8.  kinesiotaping    TREATMENT PLAN:  Effective Dates: 4/4/2022 to 7/3/2022 Frequency/Duration: 2x/week up to 90 days  2 xweek x90 days  and upon reassessment. Will adjust frequency and duration as progress indicates. GOALS: (Goals have been discussed and agreed upon with patient.)  Short-Term Functional Goals: Time Frame: 45 days  1. The patient/caregiver will verbalize understanding of lymphedema precautions. 2. Patient will be independent with skin care regimen to decrease risk of cellulitis. 3. The patient/caregiver will be independent with self-manual lymph drainage techniques and show decrease in limb volume. 4. Patient will be independent in lymphatic exercises. Discharge Goals: Time Frame: 90 days  1.  Patient's bilateral lower extremity circumferential measurements will decrease on volumetric graph by 3-5cm to maximize functional use in ADL's.    2. The patient/caregiver will be independent with home management of lymphedema. 3. Patient/caregiver will be independent donning and doffing bilateral lower extremity compression garment. Rehabilitation Potential For Stated Goals: Good              The information in this section was collected on 4/18/2022   (except where otherwise noted). OCCUPATIONAL PROFILE & HISTORY:   History of Present Injury/Illness (Reason for Referral):  Ms. Brandyn Solorzano was referred to OT for the evaluation and treatment of bilateral LE lipolymphedema with greater involvement in the LLE. Contributing to LE swelling is a genetic and family (mother, sister, grandmother) history as well as multiple comorbidities including morbid obesity, cardiac issues and CHF. She states swelling has been present in some degree since the birth of her first child in [birth year] at the age of 30. Swelling in the LLE has gotten progressively worse over the last year with foot and toes now being involved. At times her left foot is so large she can not wear a shoe. Swelling is worse by the end of the day and is historically worse in the summer and with travel. LE's are tender to the touch. She has tried multiple compression garments over the years and is unable to tolerate due to an allergic reaction to the fabric. She watches her salt intake and elevates her legs during the day. She presents with stage 1-2 lipolymphedema LLE>RLE, LE tenderness/pain with tourniquet appearance around ankles bilaterally. She would benefit from skilled OT for complete decongestive therapy to decrease LE swelling and explore alternate compression options.    Past Medical History/Comorbidities:   Ms. Brandyn Solorzano  has a past medical history of Arrhythmia, Arthritis, CAD (coronary artery disease), Cancer ([address] Congestive heart failure ([facility] [address] Essential hypertension, GERD (gastroesophageal reflux disease), Hyperlipidemia, Morbid obesity ([address] Nausea & vomiting, Sleep apnea, and Vertigo. She has no past medical history of Aneurysm ([facility] [address] Asthma, Autoimmune disease ([facility] [address] Chronic kidney disease, Chronic obstructive pulmonary disease ([facility] [address] Chronic pain, Coagulation disorder ([facility] [address] Diabetes ([facility] [address] Endocarditis, Liver disease, Psychiatric disorder, PUD (peptic ulcer disease), Rheumatic fever, Seizures ([facility] [address] Stroke ([facility] [address] Thromboembolus ([facility] [address] or Thyroid disease. Ms. Joseph Llanes  has a past surgical history that includes hx gastric bypass; hx hysterectomy; hx cholecystectomy; hx tonsillectomy; hx orthopaedic; and hx orthopaedic. Social History/Living Environment:     Pt lives in one level home with spouse   Prior Level of Function/Work/Activity:  Retired teacher   Dominant Side:         RIGHT  Previous Treatment Approaches:          Various compression garments, Lasix - no therapy for lymphedema to date   Current Medications:    Current Outpatient Medications:     omeprazole (PRILOSEC) 20 mg capsule, TAKE ONE CAPSULE BY MOUTH EVERY OTHER DAY, Disp: , Rfl:     clopidogreL (PLAVIX) 75 mg tab, Take 1 Tablet by mouth daily. , Disp: 30 Tablet, Rfl: 6    buPROPion XL (WELLBUTRIN XL) 300 mg XL tablet, Take 300 mg by mouth daily. , Disp: , Rfl:     naltrexone (DEPADE) 50 mg tablet, Take 25 mg by mouth two (2) times a day., Disp: , Rfl:     metoprolol succinate (TOPROL-XL) 25 mg XL tablet, Take 0.5 Tablets by mouth daily. 1/2 qd, Disp: 90 Tablet, Rfl: 3    aspirin delayed-release 81 mg tablet, Take 1 Tablet by mouth daily. , Disp: 30 Tablet, Rfl: 0    nabumetone (RELAFEN) 750 mg tablet, Take 750 mg by mouth as needed. , Disp: , Rfl:     furosemide (Lasix) 40 mg tablet, Take 1 Tablet by mouth daily. (Patient taking differently: Take 40 mg by mouth as needed.), Disp: 90 Tablet, Rfl: 3    potassium chloride SR (KLOR-CON 10) 10 mEq tablet, Take 1 Tablet by mouth daily.  (Patient taking differently: Take 10 mEq by mouth as needed.), Disp: 90 Tablet, Rfl: 3    nitroglycerin (NITROSTAT) 0.4 mg SL tablet, 1 Tablet by SubLINGual route every five (5) minutes as needed for Chest Pain., Disp: 25 Tablet, Rfl: 11    polyethylene glycol (Miralax) 17 gram/dose powder, Take 17 g by mouth daily. , Disp: , Rfl:     multivitamin (ONE A DAY) tablet, Take 1 Tab by mouth daily. , Disp: , Rfl:     estradiol (ESTRACE) 2 mg tablet, Take 2 mg by mouth daily. , Disp: , Rfl:     fluticasone (FLONASE) 50 mcg/actuation nasal spray, 2 Sprays by Both Nostrils route as needed. , Disp: , Rfl:     montelukast (SINGULAIR) 10 mg tablet, Take 10 mg by mouth daily. , Disp: , Rfl:             Date Last Reviewed:  4/18/2022   Complexity Level : Expanded review of therapy/medical records (1-2):  MODERATE COMPLEXITY   ASSESSMENT OF OCCUPATIONAL PERFORMANCE:   Palpation:          Stage 1 RLE with soft, pitting fluid, improves with elevation        Stage 1-2 LLE with dense pitting/nonpitting fluid/hardening of the tissue, little to no improvement with elevation - tender to touch, dorsal hump, large accumulation of fluid around ankle   ROM:          WFL  Strength:          WFL  Special Tests:          Stemmer sign positive LLE, negative RLE  Skin Integrity:          Skin is intact with skin dryness present  Sensation:  Intact   Functional Mobility:  Independent with decreased activity tolerance   Activities of Daily Living :independent   Edema/Girth:  1+ and 2+   PRETREATMENT AFFECTED LIMB(s): right lower extremity  left lower extremity      Date:  4-4-22         Right / Left           Groin   []      []           8 inches   []      []           4 inches   []      []         PoplitealSpace   [x]      [x] 42.5/48          8 inches   [x]      [x] 43/43.5          4 inches   [x]      [x] 33.5/34          Ankle   [x]      [x] 27.5/30.5          Instep   [x]      [x] 22.5/23        Measurements are taken in centimeters:  2.54 cm = 1 inch  Total:right 169cm        left 179cm              Physical Skills Involved:  1. Activity Tolerance  2. Edema  3. Skin Integrity  4. pain Cognitive Skills Affected (resulting in the inability to perform in a timely and safe manner):  1. Psychosocial Skills Affected:  1. Habits/Routines   Number of elements that affect the Plan of Care: 3-5:  MODERATE COMPLEXITY   CLINICAL DECISION MAKING:   Outcome Measure: Tool Used: Tool Used: Lymphedema Life Impact Scale   Score:  Initial: 44 Most Recent: X (Date: -- )   Interpretation of Score: The Lymphedema Life Impact Scale (LLIS) is a validated instrument that measures the physical, functional, and psychosocial concerns pertinent to patients with extremity lymphedema. The Scale's questionnaire is administered to patients to gauge impairments, activity limitations, and participation restrictions resulting from their lymphedema. Score 0 1-13 14-26 27-40 41-54 55-67 68   Modifier CH CI CJ CK CL CM CN     ? Other PT/OT Primary Functional Limitations:     - CURRENT STATUS: CL - 60%-79% impaired, limited or restricted    - GOAL STATUS: CK - 40%-59% impaired, limited or restricted    - D/C STATUS:  ---------------To be determined---------------       Medical Necessity:   · Skilled intervention continues to be required due to uncontrolled swelling increasing risk of cellulitis and hindering ADL's. Reason for Services/Other Comments:  · Patient continues to require skilled intervention due to inability to self manage lymphedema at this time.   Clinical Decision-Making Assessment:     Use of outcome tool(s) and clinical judgement create a POC that gives a: Questionable prediction of patient's progress: MODERATE COMPLEXITY   TREATMENT:   (In addition to Assessment/Re-Assessment sessions the following treatments were rendered)    Pre-treatment Symptoms/Complaints:  Pt removed bandages daily and rewrapped herself using stockinet and one short stretch bandage on each leg  Pain: Initial:   Pain Intensity 1: 2  Post Session:  2 Occupational Therapy Treatments:    Therapeutic Exercise ( minutes):     HEP:  As above; handouts given to patient for all exercises. Manual Therapy:(30 minutes)  Manual lymphatic drainage of trunk and bilateral lower extremities; diaphragmatic breathing to facilitate pumping of the thoracic duct. Application of Eucerin lotion. Manual Lymph Drainage:          Lymph Nodes:    Cervical Supraclavicular Axillary Abdominal Inguinal Popliteal Antecubital   RIGHT [x]     [x]     [x]     [x]     [x]     [x]     []       LEFT [x]     [x]     [x]     [x]     [x]     [x]     []          Anastamoses:   Axillo-axillary Inguino-inguinal Axillo-inguinal Inguino-axillary   ANTERIOR []     []     []     [x]       POSTERIOR []     []     []     []       RIGHT []     []     []     [x]       LEFT []     []     []     [x]         Limbs:   []    RUE     []    LUE     [x]    RLE    [x]    LLE   ADL/Self Care:(30 min): Instructed in diaphragmatic breathing and self MLD. Pt returned demonstration. Provided handout. B LE multilayer compression bandaging using cotton stockinet and 2 short stretch bandages. Pt will wear as tolerated; encouraged to wear until next treatment session with removal prior to session in order to shower. Treatment/Session Assessment:    · Response to Treatment:  Pt tolerated without complication. Weary of compression due to multiple skin allergies. Tolerating cotton stockinet for liner with bandages. · Compliance with Program/Exercises: Fair compliance  · Recommendations/Intent for next treatment session: \"Next visit will focus on complete decongestive therapy - reduction phase \".   Total Treatment Duration: 60min  OT Patient Time In/Time Out  Time In: 1230  Time Out: 0130    Lucian Rea OTR/L, CLT          Visit Approval Visit # Therapist initials Date A NS / Cx < 24 hr >24 hr Cx Comments   Medicare/BCBS Supplement 1 HG 4.4.22 [x]  [] [] Initial evaluation    2 wg 4.14.22 [x] [] [] 3M; 1ADL    3 wg 4.18.22 [x] [] [] 3M;1ADL       [] [] []        [] [] []        [] [] []        [] [] []        [] [] []        [] [] []        [] [] []        [] [] []        [] [] []        [] [] []        [] [] []        [] [] []        [] [] []        [] [] []        [] [] []

## 2022-04-21 ENCOUNTER — HOSPITAL ENCOUNTER (OUTPATIENT)
Dept: PHYSICAL THERAPY | Age: 72
Discharge: HOME | End: 2022-04-21
Payer: MEDICARE

## 2022-04-21 PROCEDURE — 97535 SELF CARE MNGMENT TRAINING: CPT

## 2022-04-21 PROCEDURE — 97140 MANUAL THERAPY 1/> REGIONS: CPT

## 2022-04-21 NOTE — PROGRESS NOTES
Kerry Hylton Irina  : 1950  [place]  Secondary: [facility]  [address], [place] [facility] [place], [place], [address]  [phone]   [phone]           OUTPATIENT OCCUPATIONAL THERAPY: Daily Note 2022    ICD-10: Treatment Diagnosis: Q82.0 hereditary or primary lymphedema, I89.0 lymphedema not elsewhere classified, M79.609 pain in limbs   Precautions/Allergies:   Latex, Other food, Atorvastatin, Azo pms [black cohosh-chaste-am.ginseng], Codeine, Colchicine, Hydrocodone, Nickel, Oxycodone, Phenazopyridine, Protonix [pantoprazole], and Statins-hmg-coa reductase inhibitors   Fall Risk Score:    Ambulatory/Rehab Services H2 Model Falls Risk Assessment    Risk Factors:       No Risk Factors Identified Ability to Rise from Chair:       (1)  Pushes up, successful in one attempt    Falls Prevention Plan:       No modifications necessary   Total: (5 or greater = High Risk): 1     [facility] States Patent #[id number]. Federal Law prohibits the replication, distribution or use without written permission from [employer]     MD Orders: eval and treat MEDICAL/REFERRING DIAGNOSIS:   Lymphedema, not elsewhere classified [I89.0]   DATE OF ONSET: chronic  REFERRING PHYSICIAN: Vikas Medellin MD  RETURN PHYSICIAN APPOINTMENT: to be determined      INITIAL ASSESSMENT:  Ms. Ricky Spangler was referred to OT for the evaluation and treatment of bilateral LE lipolymphedema with greater involvement in the LLE. Contributing to LE swelling is a genetic and family (mother, sister, grandmother) history as well as multiple comorbidities including morbid obesity, cardiac issues and CHF. She states swelling has been present in some degree since the birth of her first child in 65 at the age of 30.  Swelling in the LLE has gotten progressively worse over the last year with foot and toes now being involved. At times her left foot is so large she can not wear a shoe. Swelling is worse by the end of the day and is historically worse in the summer and with travel. LE's are tender to the touch. She has tried multiple compression garments over the years and is unable to tolerate due to an allergic reaction to the fabric. She watches her salt intake and elevates her legs during the day. She presents with stage 1-2 lipolymphedema LLE>RLE, LE tenderness/pain with tourniquet appearance around ankles bilaterally. She would benefit from skilled OT for complete decongestive therapy to decrease LE swelling and explore alternate compression options. PLAN OF CARE:   PROBLEM LIST:  1. Increased Pain  2. Decreased Activity Tolerance  3. Edema/Girth  4. Decreased Knowledge of Precautions  5. Decreased Skin Integrity/Hygeine  6. Decreased [place] with Home Exercise Program INTERVENTIONS PLANNED  1. Skin care  2. Compression bandaging  3. Fitting for compression garment(s)  4. Manual therapy/Manual lymph drainage  5. Therapeutic exercise/Therapeutic activities  6. Patient Education  7. Compression pump trial prn  8.  kinesiotaping    TREATMENT PLAN:  Effective Dates: 4/4/2022 to 7/3/2022 Frequency/Duration: 2x/week up to 90 days  2 xweek x90 days  and upon reassessment. Will adjust frequency and duration as progress indicates. GOALS: (Goals have been discussed and agreed upon with patient.)  Short-Term Functional Goals: Time Frame: 45 days  1. The patient/caregiver will verbalize understanding of lymphedema precautions. 2. Patient will be independent with skin care regimen to decrease risk of cellulitis. 3. The patient/caregiver will be independent with self-manual lymph drainage techniques and show decrease in limb volume. 4. Patient will be independent in lymphatic exercises. Discharge Goals: Time Frame: 90 days  1.  Patient's bilateral lower extremity circumferential measurements will decrease on volumetric graph by 3-5cm to maximize functional use in ADL's.    2. The patient/caregiver will be independent with home management of lymphedema. 3. Patient/caregiver will be independent donning and doffing bilateral lower extremity compression garment. Rehabilitation Potential For Stated Goals: Good              The information in this section was collected on 4/21/2022   (except where otherwise noted). OCCUPATIONAL PROFILE & HISTORY:   History of Present Injury/Illness (Reason for Referral):  Ms. Alvan Ahumada was referred to OT for the evaluation and treatment of bilateral LE lipolymphedema with greater involvement in the LLE. Contributing to LE swelling is a genetic and family (mother, sister, grandmother) history as well as multiple comorbidities including morbid obesity, cardiac issues and CHF. She states swelling has been present in some degree since the birth of her first child in 65 at the age of 30. Swelling in the LLE has gotten progressively worse over the last year with foot and toes now being involved. At times her left foot is so large she can not wear a shoe. Swelling is worse by the end of the day and is historically worse in the summer and with travel. LE's are tender to the touch. She has tried multiple compression garments over the years and is unable to tolerate due to an allergic reaction to the fabric. She watches her salt intake and elevates her legs during the day. She presents with stage 1-2 lipolymphedema LLE>RLE, LE tenderness/pain with tourniquet appearance around ankles bilaterally. She would benefit from skilled OT for complete decongestive therapy to decrease LE swelling and explore alternate compression options.    Past Medical History/Comorbidities:   Ms. Alvan Ahumada  has a past medical history of Arrhythmia, Arthritis, CAD (coronary artery disease), Cancer ([facility] [address] Congestive heart failure ([facility] [address] Essential hypertension, GERD (gastroesophageal reflux disease), Hyperlipidemia, Morbid obesity ([address] Nausea & vomiting, Sleep apnea, and Vertigo. She has no past medical history of Aneurysm ([facility] [address] Asthma, Autoimmune disease ([address] Chronic kidney disease, Chronic obstructive pulmonary disease ([address] Chronic pain, Coagulation disorder ([facility] [address] Diabetes ([facility] [address] Endocarditis, Liver disease, Psychiatric disorder, PUD (peptic ulcer disease), Rheumatic fever, Seizures ([facility] [address] Stroke ([facility] [address] Thromboembolus ([facility] [address] or Thyroid disease. Ms. Chas Tobias  has a past surgical history that includes hx gastric bypass; hx hysterectomy; hx cholecystectomy; hx tonsillectomy; hx orthopaedic; and hx orthopaedic. Social History/Living Environment:     Pt lives in one level home with spouse   Prior Level of Function/Work/Activity:  Retired teacher   Dominant Side:         RIGHT  Previous Treatment Approaches:          Various compression garments, Lasix - no therapy for lymphedema to date   Current Medications:    Current Outpatient Medications:     ARIPiprazole (ABILIFY) 5 mg tablet, Take 5 mg by mouth daily. , Disp: , Rfl:     evolocumab (REPATHA SURECLICK) pen injection, 140 mg by SubCUTAneous route., Disp: , Rfl:     omeprazole (PRILOSEC) 20 mg capsule, daily. , Disp: , Rfl:     clopidogreL (PLAVIX) 75 mg tab, Take 1 Tablet by mouth daily. (Patient not taking: Reported on 4/20/2022), Disp: 30 Tablet, Rfl: 6    buPROPion XL (WELLBUTRIN XL) 300 mg XL tablet, Take 300 mg by mouth daily. , Disp: , Rfl:     naltrexone (DEPADE) 50 mg tablet, Take 25 mg by mouth two (2) times a day., Disp: , Rfl:     metoprolol succinate (TOPROL-XL) 25 mg XL tablet, Take 0.5 Tablets by mouth daily. 1/2 qd, Disp: 90 Tablet, Rfl: 3    aspirin delayed-release 81 mg tablet, Take 1 Tablet by mouth daily. , Disp: 30 Tablet, Rfl: 0    nabumetone (RELAFEN) 750 mg tablet, Take 750 mg by mouth as needed. (Patient not taking: Reported on 4/20/2022), Disp: , Rfl:     furosemide (Lasix) 40 mg tablet, Take 1 Tablet by mouth daily.  (Patient taking differently: Take 40 mg by mouth as needed.), Disp: 90 Tablet, Rfl: 3    potassium chloride SR (KLOR-CON 10) 10 mEq tablet, Take 1 Tablet by mouth daily. (Patient taking differently: Take 10 mEq by mouth as needed.), Disp: 90 Tablet, Rfl: 3    nitroglycerin (NITROSTAT) 0.4 mg SL tablet, 1 Tablet by SubLINGual route every five (5) minutes as needed for Chest Pain., Disp: 25 Tablet, Rfl: 11    polyethylene glycol (Miralax) 17 gram/dose powder, Take 17 g by mouth daily. , Disp: , Rfl:     multivitamin (ONE A DAY) tablet, Take 1 Tab by mouth daily. , Disp: , Rfl:     estradiol (ESTRACE) 2 mg tablet, Take 2 mg by mouth daily. , Disp: , Rfl:     fluticasone (FLONASE) 50 mcg/actuation nasal spray, 2 Sprays by Both Nostrils route as needed. , Disp: , Rfl:     montelukast (SINGULAIR) 10 mg tablet, Take 10 mg by mouth daily. , Disp: , Rfl:             Date Last Reviewed:  4/21/2022   Complexity Level : Expanded review of therapy/medical records (1-2):  MODERATE COMPLEXITY   ASSESSMENT OF OCCUPATIONAL PERFORMANCE:   Palpation:          Stage 1 RLE with soft, pitting fluid, improves with elevation        Stage 1-2 LLE with dense pitting/nonpitting fluid/hardening of the tissue, little to no improvement with elevation - tender to touch, dorsal hump, large accumulation of fluid around ankle   ROM:          WFL  Strength:          WFL  Special Tests:          Stemmer sign positive LLE, negative RLE  Skin Integrity:          Skin is intact with skin dryness present  Sensation:  Intact   Functional Mobility:  Independent with decreased activity tolerance   Activities of Daily Living :independent   Edema/Girth:  1+ and 2+   PRETREATMENT AFFECTED LIMB(s): right lower extremity  left lower extremity      Date:  4-4-22 4.21.22        Right / Left           Groin   []      []           8 inches   []      []           4 inches   []      []         PoplitealSpace   [x]      [x] 42.5/48 37/39.5         8 inches   [x]      [x] 43/43.5 40/41.5         4 inches   [x]      [x] 33.5/34 30/32         Ankle   [x]      [x] 27.5/30.5 24/23         Instep   [x]      [x] 22.5/23 18/20       Measurements are taken in centimeters:  2.54 cm = 1 inch  Total:right 169cm 149       left 179cm 156             Physical Skills Involved:  1. Activity Tolerance  2. Edema  3. Skin Integrity  4. pain Cognitive Skills Affected (resulting in the inability to perform in a timely and safe manner):  1. Psychosocial Skills Affected:  1. Habits/Routines   Number of elements that affect the Plan of Care: 3-5:  MODERATE COMPLEXITY   CLINICAL DECISION MAKING:   Outcome Measure: Tool Used: Tool Used: Lymphedema Life Impact Scale   Score:  Initial: 44 Most Recent: X (Date: -- )   Interpretation of Score: The Lymphedema Life Impact Scale (LLIS) is a validated instrument that measures the physical, functional, and psychosocial concerns pertinent to patients with extremity lymphedema. The Scale's questionnaire is administered to patients to gauge impairments, activity limitations, and participation restrictions resulting from their lymphedema. Score 0 1-13 14-26 27-40 41-54 55-67 68   Modifier CH CI CJ CK CL CM CN     ? Other PT/OT Primary Functional Limitations:     - CURRENT STATUS: CL - 60%-79% impaired, limited or restricted    - GOAL STATUS: CK - 40%-59% impaired, limited or restricted    - D/C STATUS:  ---------------To be determined---------------       Medical Necessity:   · Skilled intervention continues to be required due to uncontrolled swelling increasing risk of cellulitis and hindering ADL's. Reason for Services/Other Comments:  · Patient continues to require skilled intervention due to inability to self manage lymphedema at this time.   Clinical Decision-Making Assessment:     Use of outcome tool(s) and clinical judgement create a POC that gives a: Questionable prediction of patient's progress: MODERATE COMPLEXITY   TREATMENT:   (In addition to Assessment/Re-Assessment sessions the following treatments were rendered)    Pre-treatment Symptoms/Complaints:  Pt allergic to many materials and has not been able to wear compression garments in the past  Pain: Initial:   Pain Intensity 1: 2  Post Session:  2   Occupational Therapy Treatments:    Therapeutic Exercise ( minutes):     HEP:  As above; handouts given to patient for all exercises. Manual Therapy:(30 minutes)  Manual lymphatic drainage of trunk and bilateral lower extremities; diaphragmatic breathing to facilitate pumping of the thoracic duct. Application of Eucerin lotion. Significant reduction of 20 cm R LE and 23 cm L LE cumulative circumferential volumetric measurements. Manual Lymph Drainage:          Lymph Nodes:    Cervical Supraclavicular Axillary Abdominal Inguinal Popliteal Antecubital   RIGHT [x]     [x]     [x]     [x]     [x]     [x]     []       LEFT [x]     [x]     [x]     [x]     [x]     [x]     []          Anastamoses:   Axillo-axillary Inguino-inguinal Axillo-inguinal Inguino-axillary   ANTERIOR []     []     []     [x]       POSTERIOR []     []     []     []       RIGHT []     []     []     [x]       LEFT []     []     []     [x]         Limbs:   []    RUE     []    LUE     [x]    RLE    [x]    LLE   ADL/Self Care:(30 min): B LE multilayer compression bandaging using cotton stockinet and 2 short stretch bandages. Pt will wear as tolerated; encouraged to wear until next treatment session with removal prior to session in order to shower. Ordered Sigvarus Dynaven class 1 compression socks size LS, white, to be sent to pt's home. Treatment/Session Assessment:    · Response to Treatment:  Pt tolerated without complication. Weary of compression due to multiple skin allergies. Tolerating cotton stockinet for liner with bandages. · Compliance with Program/Exercises: Fair compliance  · Recommendations/Intent for next treatment session:  \"Next visit will focus on complete decongestive therapy - reduction phase \".   Total Treatment Duration: 60min  OT Patient Time In/Time Out  Time In: 1230  Time Out: 0130    Stef Terry, OTR/L, CLT          Visit Approval Visit # Therapist initials Date A NS / Cx < 24 hr >24 hr Cx Comments   Medicare/BCBS Supplement 1 HG 4.4.22 [x]  [] [] Initial evaluation    2 wg 4.14.22 [x] [] [] 3M; 1ADL    3 wg 4.18.22 [x] [] [] 3M;1ADL       [] [] []        [] [] []        [] [] []        [] [] []        [] [] []        [] [] []        [] [] []        [] [] []        [] [] []        [] [] []        [] [] []        [] [] []        [] [] []        [] [] []        [] [] []

## 2022-04-25 ENCOUNTER — HOSPITAL ENCOUNTER (OUTPATIENT)
Dept: PHYSICAL THERAPY | Age: 72
Discharge: HOME | End: 2022-04-25
Payer: MEDICARE

## 2022-04-25 PROCEDURE — 97535 SELF CARE MNGMENT TRAINING: CPT

## 2022-04-25 PROCEDURE — 97140 MANUAL THERAPY 1/> REGIONS: CPT

## 2022-04-25 NOTE — PROGRESS NOTES
Jered Olsen Irina  : 1950  [place]  [place] [address] [place] [facility]  [address], [facility] [place], [address]  [phone]   [phone]           OUTPATIENT OCCUPATIONAL THERAPY: Daily Note 2022    ICD-10: Treatment Diagnosis: Q82.0 hereditary or primary lymphedema, I89.0 lymphedema not elsewhere classified, M79.609 pain in limbs   Precautions/Allergies:   Latex, Other food, Atorvastatin, Azo pms [black cohosh-chaste-am.ginseng], Codeine, Colchicine, Hydrocodone, Nickel, Oxycodone, Phenazopyridine, Protonix [pantoprazole], and Statins-hmg-coa reductase inhibitors   Fall Risk Score:    Ambulatory/Rehab Services H2 Model Falls Risk Assessment    Risk Factors:       No Risk Factors Identified Ability to Rise from Chair:       (1)  Pushes up, successful in one attempt    Falls Prevention Plan:       No modifications necessary   Total: (5 or greater = High Risk): 1     [facility] [employer]. All Rights Reserved. [facility] Patent #[id number]. Federal Law prohibits the replication, distribution or use without written permission from [employer]     MD Orders: eval and treat MEDICAL/REFERRING DIAGNOSIS:   Lymphedema, not elsewhere classified [I89.0]   DATE OF ONSET: chronic  REFERRING PHYSICIAN: Jose Miller MD  RETURN PHYSICIAN APPOINTMENT: to be determined      INITIAL ASSESSMENT:  Ms. Rusty Rodriguez was referred to OT for the evaluation and treatment of bilateral LE lipolymphedema with greater involvement in the LLE. Contributing to LE swelling is a genetic and family (mother, sister, grandmother) history as well as multiple comorbidities including morbid obesity, cardiac issues and CHF. She states swelling has been present in some degree since the birth of her first child in [birth year] at the age of 30.  Swelling in the LLE has gotten progressively worse over the last year with foot and toes now being involved. At times her left foot is so large she can not wear a shoe. Swelling is worse by the end of the day and is historically worse in the summer and with travel. LE's are tender to the touch. She has tried multiple compression garments over the years and is unable to tolerate due to an allergic reaction to the fabric. She watches her salt intake and elevates her legs during the day. She presents with stage 1-2 lipolymphedema LLE>RLE, LE tenderness/pain with tourniquet appearance around ankles bilaterally. She would benefit from skilled OT for complete decongestive therapy to decrease LE swelling and explore alternate compression options. PLAN OF CARE:   PROBLEM LIST:  1. Increased Pain  2. Decreased Activity Tolerance  3. Edema/Girth  4. Decreased Knowledge of Precautions  5. Decreased Skin Integrity/Hygeine  6. Decreased [place] with Home Exercise Program INTERVENTIONS PLANNED  1. Skin care  2. Compression bandaging  3. Fitting for compression garment(s)  4. Manual therapy/Manual lymph drainage  5. Therapeutic exercise/Therapeutic activities  6. Patient Education  7. Compression pump trial prn  8.  kinesiotaping    TREATMENT PLAN:  Effective Dates: 4/4/2022 to 7/3/2022 Frequency/Duration: 2x/week up to 90 days  2 xweek x90 days  and upon reassessment. Will adjust frequency and duration as progress indicates. GOALS: (Goals have been discussed and agreed upon with patient.)  Short-Term Functional Goals: Time Frame: 45 days  1. The patient/caregiver will verbalize understanding of lymphedema precautions. 2. Patient will be independent with skin care regimen to decrease risk of cellulitis. 3. The patient/caregiver will be independent with self-manual lymph drainage techniques and show decrease in limb volume. 4. Patient will be independent in lymphatic exercises. Discharge Goals: Time Frame: 90 days  1.  Patient's bilateral lower extremity circumferential measurements will decrease on volumetric graph by 3-5cm to maximize functional use in ADL's.    2. The patient/caregiver will be independent with home management of lymphedema. 3. Patient/caregiver will be independent donning and doffing bilateral lower extremity compression garment. Rehabilitation Potential For Stated Goals: Good              The information in this section was collected on 4/25/2022   (except where otherwise noted). OCCUPATIONAL PROFILE & HISTORY:   History of Present Injury/Illness (Reason for Referral):  Ms. Brandyn Solorzano was referred to OT for the evaluation and treatment of bilateral LE lipolymphedema with greater involvement in the LLE. Contributing to LE swelling is a genetic and family (mother, sister, grandmother) history as well as multiple comorbidities including morbid obesity, cardiac issues and CHF. She states swelling has been present in some degree since the birth of her first child in [birth year] at the age of 30. Swelling in the LLE has gotten progressively worse over the last year with foot and toes now being involved. At times her left foot is so large she can not wear a shoe. Swelling is worse by the end of the day and is historically worse in the summer and with travel. LE's are tender to the touch. She has tried multiple compression garments over the years and is unable to tolerate due to an allergic reaction to the fabric. She watches her salt intake and elevates her legs during the day. She presents with stage 1-2 lipolymphedema LLE>RLE, LE tenderness/pain with tourniquet appearance around ankles bilaterally. She would benefit from skilled OT for complete decongestive therapy to decrease LE swelling and explore alternate compression options.    Past Medical History/Comorbidities:   Ms. Brandyn Solorzano  has a past medical history of Arrhythmia, Arthritis, CAD (coronary artery disease), Cancer ([address] Congestive heart failure ([facility] [address] Essential hypertension, GERD (gastroesophageal reflux disease), Hyperlipidemia, Morbid obesity ([address] Nausea & vomiting, Sleep apnea, and Vertigo. She has no past medical history of Aneurysm ([address] Asthma, Autoimmune disease ([address] Chronic kidney disease, Chronic obstructive pulmonary disease ([address] Chronic pain, Coagulation disorder ([address] Diabetes ([facility] [address] Endocarditis, Liver disease, Psychiatric disorder, PUD (peptic ulcer disease), Rheumatic fever, Seizures ([facility] [address] Stroke ([address] Thromboembolus ([facility] [address] or Thyroid disease. Ms. Colin Rangel  has a past surgical history that includes hx gastric bypass; hx hysterectomy; hx cholecystectomy; hx tonsillectomy; hx orthopaedic; and hx orthopaedic. Social History/Living Environment:     Pt lives in one level home with spouse   Prior Level of Function/Work/Activity:  Retired teacher   Dominant Side:         RIGHT  Previous Treatment Approaches:          Various compression garments, Lasix - no therapy for lymphedema to date   Current Medications:    Current Outpatient Medications:     ARIPiprazole (ABILIFY) 5 mg tablet, Take 5 mg by mouth daily. , Disp: , Rfl:     evolocumab (REPATHA SURECLICK) pen injection, 140 mg by SubCUTAneous route., Disp: , Rfl:     omeprazole (PRILOSEC) 20 mg capsule, daily. , Disp: , Rfl:     clopidogreL (PLAVIX) 75 mg tab, Take 1 Tablet by mouth daily. (Patient not taking: Reported on 4/20/2022), Disp: 30 Tablet, Rfl: 6    buPROPion XL (WELLBUTRIN XL) 300 mg XL tablet, Take 300 mg by mouth daily. , Disp: , Rfl:     naltrexone (DEPADE) 50 mg tablet, Take 25 mg by mouth two (2) times a day., Disp: , Rfl:     metoprolol succinate (TOPROL-XL) 25 mg XL tablet, Take 0.5 Tablets by mouth daily. 1/2 qd, Disp: 90 Tablet, Rfl: 3    aspirin delayed-release 81 mg tablet, Take 1 Tablet by mouth daily. , Disp: 30 Tablet, Rfl: 0    nabumetone (RELAFEN) 750 mg tablet, Take 750 mg by mouth as needed. (Patient not taking: Reported on 4/20/2022), Disp: , Rfl:     furosemide (Lasix) 40 mg tablet, Take 1 Tablet by mouth daily.  (Patient taking differently: Take 40 mg by mouth as needed.), Disp: 90 Tablet, Rfl: 3    potassium chloride SR (KLOR-CON 10) 10 mEq tablet, Take 1 Tablet by mouth daily. (Patient taking differently: Take 10 mEq by mouth as needed.), Disp: 90 Tablet, Rfl: 3    nitroglycerin (NITROSTAT) 0.4 mg SL tablet, 1 Tablet by SubLINGual route every five (5) minutes as needed for Chest Pain., Disp: 25 Tablet, Rfl: 11    polyethylene glycol (Miralax) 17 gram/dose powder, Take 17 g by mouth daily. , Disp: , Rfl:     multivitamin (ONE A DAY) tablet, Take 1 Tab by mouth daily. , Disp: , Rfl:     estradiol (ESTRACE) 2 mg tablet, Take 2 mg by mouth daily. , Disp: , Rfl:     fluticasone (FLONASE) 50 mcg/actuation nasal spray, 2 Sprays by Both Nostrils route as needed. , Disp: , Rfl:     montelukast (SINGULAIR) 10 mg tablet, Take 10 mg by mouth daily. , Disp: , Rfl:             Date Last Reviewed:  4/25/2022   Complexity Level : Expanded review of therapy/medical records (1-2):  MODERATE COMPLEXITY   ASSESSMENT OF OCCUPATIONAL PERFORMANCE:   Palpation:          Stage 1 RLE with soft, pitting fluid, improves with elevation        Stage 1-2 LLE with dense pitting/nonpitting fluid/hardening of the tissue, little to no improvement with elevation - tender to touch, dorsal hump, large accumulation of fluid around ankle   ROM:          WFL  Strength:          WFL  Special Tests:          Stemmer sign positive LLE, negative RLE  Skin Integrity:          Skin is intact with skin dryness present  Sensation:  Intact   Functional Mobility:  Independent with decreased activity tolerance   Activities of Daily Living :independent   Edema/Girth:  1+ and 2+   PRETREATMENT AFFECTED LIMB(s): right lower extremity  left lower extremity      Date:  4-4-22 4.21.22        Right / Left           Groin   []      []           8 inches   []      []           4 inches   []      []         PoplitealSpace   [x]      [x] 42.5/48 37/39.5         8 inches   [x]      [x] 43/43.5 40/41.5         4 inches   [x]      [x] 33.5/34 30/32         Ankle   [x]      [x] 27.5/30.5 24/23         Instep   [x]      [x] 22.5/23 18/20       Measurements are taken in centimeters:  2.54 cm = 1 inch  Total:right 169cm 149       left 179cm 156             Physical Skills Involved:  1. Activity Tolerance  2. Edema  3. Skin Integrity  4. pain Cognitive Skills Affected (resulting in the inability to perform in a timely and safe manner):  1. Psychosocial Skills Affected:  1. Habits/Routines   Number of elements that affect the Plan of Care: 3-5:  MODERATE COMPLEXITY   CLINICAL DECISION MAKING:   Outcome Measure: Tool Used: Tool Used: Lymphedema Life Impact Scale   Score:  Initial: 44 Most Recent: X (Date: -- )   Interpretation of Score: The Lymphedema Life Impact Scale (LLIS) is a validated instrument that measures the physical, functional, and psychosocial concerns pertinent to patients with extremity lymphedema. The Scale's questionnaire is administered to patients to gauge impairments, activity limitations, and participation restrictions resulting from their lymphedema. Score 0 1-13 14-26 27-40 41-54 55-67 68   Modifier CH CI CJ CK CL CM CN     ? Other PT/OT Primary Functional Limitations:     - CURRENT STATUS: CL - 60%-79% impaired, limited or restricted    - GOAL STATUS: CK - 40%-59% impaired, limited or restricted    - D/C STATUS:  ---------------To be determined---------------       Medical Necessity:   · Skilled intervention continues to be required due to uncontrolled swelling increasing risk of cellulitis and hindering ADL's. Reason for Services/Other Comments:  · Patient continues to require skilled intervention due to inability to self manage lymphedema at this time.   Clinical Decision-Making Assessment:     Use of outcome tool(s) and clinical judgement create a POC that gives a: Questionable prediction of patient's progress: MODERATE COMPLEXITY   TREATMENT:   (In addition to Assessment/Re-Assessment sessions the following treatments were rendered)    Pre-treatment Symptoms/Complaints:  Pt complained of increased swelling over the weekend; uncertain about etiology  Pain: Initial:   Pain Intensity 1: 2  Post Session:  2   Occupational Therapy Treatments:    Therapeutic Exercise ( minutes):     HEP:  As above; handouts given to patient for all exercises. Manual Therapy:(45 minutes)  Manual lymphatic drainage of trunk and bilateral lower extremities; diaphragmatic breathing to facilitate pumping of the thoracic duct. Application of Eucerin lotion. Manual Lymph Drainage:           Lymph Nodes:    Cervical Supraclavicular Axillary Abdominal Inguinal Popliteal Antecubital   RIGHT [x]     [x]     [x]     [x]     [x]     [x]     []       LEFT [x]     [x]     [x]     [x]     [x]     [x]     []          Anastamoses:   Axillo-axillary Inguino-inguinal Axillo-inguinal Inguino-axillary   ANTERIOR []     []     []     [x]       POSTERIOR []     []     []     []       RIGHT []     []     []     [x]       LEFT []     []     []     [x]         Limbs:   []    RUE     []    LUE     -    RLE    -    LLE   ADL/Self Care:(30 min): B LE multilayer compression bandaging using Biagrip and 2 short stretch bandages. Pt will wear as tolerated; encouraged to wear until next treatment session with removal prior to session in order to shower. Ordered Sigvarus Dynaven class 1 compression socks size LS, white, to be sent to pt's home. Treatment/Session Assessment:    · Response to Treatment:  Pt tolerated without complication. Weary of compression due to multiple skin allergies. Tolerating cotton stockinet for liner with bandages. · Compliance with Program/Exercises: Fair compliance  · Recommendations/Intent for next treatment session: \"Next visit will focus on complete decongestive therapy - reduction phase \".   Total Treatment Duration: 60min  OT Patient Time In/Time Out  Time In: 1230  Time Out: [address] Hearing, OTR/L, CLT          Visit Approval Visit # Therapist initials Date A NS / Cx < 24 hr >24 hr Cx Comments   Medicare/BCBS Supplement 1 HG 4.4.22 [x]  [] [] Initial evaluation    2 wg 4.14.22 [x] [] [] 3M; 1ADL    3 wg 4.18.22 [x] [] [] 3M;1ADL    4 wg 4.21.22 [x] [] [] 2M;2ADL    5 wg 4.25.22 [x] [] [] 3M;1ADL       [] [] []        [] [] []        [] [] []        [] [] []        [] [] []        [] [] []        [] [] []        [] [] []        [] [] []        [] [] []        [] [] []        [] [] []        [] [] []

## 2022-04-28 ENCOUNTER — HOSPITAL ENCOUNTER (OUTPATIENT)
Dept: PHYSICAL THERAPY | Age: 72
Discharge: HOME | End: 2022-04-28
Payer: MEDICARE

## 2022-04-28 PROCEDURE — 97140 MANUAL THERAPY 1/> REGIONS: CPT

## 2022-04-28 PROCEDURE — 97110 THERAPEUTIC EXERCISES: CPT

## 2022-04-28 NOTE — PROGRESS NOTES
Bolivar Chavis [facility]  : 1950  [place]  [place] [address] [place] at [facility] Therapy  [address] [facility], [facility], [address]  [phone]   [phone]           OUTPATIENT OCCUPATIONAL THERAPY: Daily Note 2022    ICD-10: Treatment Diagnosis: Q82.0 hereditary or primary lymphedema, I89.0 lymphedema not elsewhere classified, M79.609 pain in limbs   Precautions/Allergies:   Latex, Other food, Atorvastatin, Azo pms [black cohosh-chaste-am.ginseng], Codeine, Colchicine, Hydrocodone, Nickel, Oxycodone, Phenazopyridine, Protonix [pantoprazole], and Statins-hmg-coa reductase inhibitors   Fall Risk Score:    Ambulatory/Rehab Services H2 Model Falls Risk Assessment    Risk Factors:       No Risk Factors Identified Ability to Rise from Chair:       (1)  Pushes up, successful in one attempt    Falls Prevention Plan:       No modifications necessary   Total: (5 or greater = High Risk): 1     [facility]. All Rights Reserved. [facility] Patent #[id number]. Federal Law prohibits the replication, distribution or use without written permission from [employer]     MD Orders: eval and treat MEDICAL/REFERRING DIAGNOSIS:   Lymphedema, not elsewhere classified [I89.0]   DATE OF ONSET: chronic  REFERRING PHYSICIAN: Maryuri Lee MD  RETURN PHYSICIAN APPOINTMENT: to be determined      INITIAL ASSESSMENT:  Ms. Bailey Banda was referred to OT for the evaluation and treatment of bilateral LE lipolymphedema with greater involvement in the LLE. Contributing to LE swelling is a genetic and family (mother, sister, grandmother) history as well as multiple comorbidities including morbid obesity, cardiac issues and CHF. She states swelling has been present in some degree since the birth of her first child in 65 at the age of 30.  Swelling in the LLE has gotten progressively worse over the last year with foot and toes now being involved. At times her left foot is so large she can not wear a shoe. Swelling is worse by the end of the day and is historically worse in the summer and with travel. LE's are tender to the touch. She has tried multiple compression garments over the years and is unable to tolerate due to an allergic reaction to the fabric. She watches her salt intake and elevates her legs during the day. She presents with stage 1-2 lipolymphedema LLE>RLE, LE tenderness/pain with tourniquet appearance around ankles bilaterally. She would benefit from skilled OT for complete decongestive therapy to decrease LE swelling and explore alternate compression options. PLAN OF CARE:   PROBLEM LIST:  1. Increased Pain  2. Decreased Activity Tolerance  3. Edema/Girth  4. Decreased Knowledge of Precautions  5. Decreased Skin Integrity/Hygeine  6. Decreased [place] with Home Exercise Program INTERVENTIONS PLANNED  1. Skin care  2. Compression bandaging  3. Fitting for compression garment(s)  4. Manual therapy/Manual lymph drainage  5. Therapeutic exercise/Therapeutic activities  6. Patient Education  7. Compression pump trial prn  8.  kinesiotaping    TREATMENT PLAN:  Effective Dates: 4/4/2022 to 7/3/2022 Frequency/Duration: 2x/week up to 90 days  2 xweek x90 days  and upon reassessment. Will adjust frequency and duration as progress indicates. GOALS: (Goals have been discussed and agreed upon with patient.)  Short-Term Functional Goals: Time Frame: 45 days  1. The patient/caregiver will verbalize understanding of lymphedema precautions. 2. Patient will be independent with skin care regimen to decrease risk of cellulitis. 3. The patient/caregiver will be independent with self-manual lymph drainage techniques and show decrease in limb volume. 4. Patient will be independent in lymphatic exercises. Discharge Goals: Time Frame: 90 days  1.  Patient's bilateral lower extremity circumferential measurements will decrease on volumetric graph by 3-5cm to maximize functional use in ADL's.    2. The patient/caregiver will be independent with home management of lymphedema. 3. Patient/caregiver will be independent donning and doffing bilateral lower extremity compression garment. Rehabilitation Potential For Stated Goals: Good              The information in this section was collected on 4/28/2022   (except where otherwise noted). OCCUPATIONAL PROFILE & HISTORY:   History of Present Injury/Illness (Reason for Referral):  Ms. Bailey Banda was referred to OT for the evaluation and treatment of bilateral LE lipolymphedema with greater involvement in the LLE. Contributing to LE swelling is a genetic and family (mother, sister, grandmother) history as well as multiple comorbidities including morbid obesity, cardiac issues and CHF. She states swelling has been present in some degree since the birth of her first child in 65 at the age of 30. Swelling in the LLE has gotten progressively worse over the last year with foot and toes now being involved. At times her left foot is so large she can not wear a shoe. Swelling is worse by the end of the day and is historically worse in the summer and with travel. LE's are tender to the touch. She has tried multiple compression garments over the years and is unable to tolerate due to an allergic reaction to the fabric. She watches her salt intake and elevates her legs during the day. She presents with stage 1-2 lipolymphedema LLE>RLE, LE tenderness/pain with tourniquet appearance around ankles bilaterally. She would benefit from skilled OT for complete decongestive therapy to decrease LE swelling and explore alternate compression options.    Past Medical History/Comorbidities:   Ms. Bailey Banda  has a past medical history of Arrhythmia, Arthritis, CAD (coronary artery disease), Cancer ([facility] [address] Congestive heart failure ([facility] [address] Essential hypertension, GERD (gastroesophageal reflux disease), Hyperlipidemia, Morbid obesity ([address] Nausea & vomiting, Sleep apnea, and Vertigo. She has no past medical history of Aneurysm ([address] Asthma, Autoimmune disease ([address] Chronic kidney disease, Chronic obstructive pulmonary disease ([address] Chronic pain, Coagulation disorder ([address] Diabetes ([facility] [address] Endocarditis, Liver disease, Psychiatric disorder, PUD (peptic ulcer disease), Rheumatic fever, Seizures ([facility] [address] Stroke ([address] Thromboembolus ([facility] [address] or Thyroid disease. Ms. Colin Rangel  has a past surgical history that includes hx gastric bypass; hx hysterectomy; hx cholecystectomy; hx tonsillectomy; hx orthopaedic; and hx orthopaedic. Social History/Living Environment:     Pt lives in one level home with spouse   Prior Level of Function/Work/Activity:  Retired teacher   Dominant Side:         RIGHT  Previous Treatment Approaches:          Various compression garments, Lasix - no therapy for lymphedema to date   Current Medications:    Current Outpatient Medications:     ARIPiprazole (ABILIFY) 5 mg tablet, Take 5 mg by mouth daily. , Disp: , Rfl:     evolocumab (REPATHA SURECLICK) pen injection, 140 mg by SubCUTAneous route., Disp: , Rfl:     omeprazole (PRILOSEC) 20 mg capsule, daily. , Disp: , Rfl:     clopidogreL (PLAVIX) 75 mg tab, Take 1 Tablet by mouth daily. (Patient not taking: Reported on 4/20/2022), Disp: 30 Tablet, Rfl: 6    buPROPion XL (WELLBUTRIN XL) 300 mg XL tablet, Take 300 mg by mouth daily. , Disp: , Rfl:     naltrexone (DEPADE) 50 mg tablet, Take 25 mg by mouth two (2) times a day., Disp: , Rfl:     metoprolol succinate (TOPROL-XL) 25 mg XL tablet, Take 0.5 Tablets by mouth daily. 1/2 qd, Disp: 90 Tablet, Rfl: 3    aspirin delayed-release 81 mg tablet, Take 1 Tablet by mouth daily. , Disp: 30 Tablet, Rfl: 0    nabumetone (RELAFEN) 750 mg tablet, Take 750 mg by mouth as needed. (Patient not taking: Reported on 4/20/2022), Disp: , Rfl:     furosemide (Lasix) 40 mg tablet, Take 1 Tablet by mouth daily.  (Patient taking differently: Take 40 mg by mouth as needed.), Disp: 90 Tablet, Rfl: 3    potassium chloride SR (KLOR-CON 10) 10 mEq tablet, Take 1 Tablet by mouth daily. (Patient taking differently: Take 10 mEq by mouth as needed.), Disp: 90 Tablet, Rfl: 3    nitroglycerin (NITROSTAT) 0.4 mg SL tablet, 1 Tablet by SubLINGual route every five (5) minutes as needed for Chest Pain., Disp: 25 Tablet, Rfl: 11    polyethylene glycol (Miralax) 17 gram/dose powder, Take 17 g by mouth daily. , Disp: , Rfl:     multivitamin (ONE A DAY) tablet, Take 1 Tab by mouth daily. , Disp: , Rfl:     estradiol (ESTRACE) 2 mg tablet, Take 2 mg by mouth daily. , Disp: , Rfl:     fluticasone (FLONASE) 50 mcg/actuation nasal spray, 2 Sprays by Both Nostrils route as needed. , Disp: , Rfl:     montelukast (SINGULAIR) 10 mg tablet, Take 10 mg by mouth daily. , Disp: , Rfl:             Date Last Reviewed:  4/28/2022   Complexity Level : Expanded review of therapy/medical records (1-2):  MODERATE COMPLEXITY   ASSESSMENT OF OCCUPATIONAL PERFORMANCE:   Palpation:          Stage 1 RLE with soft, pitting fluid, improves with elevation        Stage 1-2 LLE with dense pitting/nonpitting fluid/hardening of the tissue, little to no improvement with elevation - tender to touch, dorsal hump, large accumulation of fluid around ankle   ROM:          WFL  Strength:          WFL  Special Tests:          Stemmer sign positive LLE, negative RLE  Skin Integrity:          Skin is intact with skin dryness present  Sensation:  Intact   Functional Mobility:  Independent with decreased activity tolerance   Activities of Daily Living :independent   Edema/Girth:  1+ and 2+   PRETREATMENT AFFECTED LIMB(s): right lower extremity  left lower extremity      Date:  4-4-22 4.21.22        Right / Left           Groin   []      []           8 inches   []      []           4 inches   []      []         PoplitealSpace   [x]      [x] 42.5/48 37/39.5         8 inches   [x]      [x] 43/43.5 40/41.5         4 inches   [x]      [x] 33.5/34 30/32         Ankle   [x]      [x] 27.5/30.5 24/23         Instep   [x]      [x] 22.5/23 18/20       Measurements are taken in centimeters:  2.54 cm = 1 inch  Total:right 169cm 149       left 179cm 156             Physical Skills Involved:  1. Activity Tolerance  2. Edema  3. Skin Integrity  4. pain Cognitive Skills Affected (resulting in the inability to perform in a timely and safe manner):  1. Psychosocial Skills Affected:  1. Habits/Routines   Number of elements that affect the Plan of Care: 3-5:  MODERATE COMPLEXITY   CLINICAL DECISION MAKING:   Outcome Measure: Tool Used: Tool Used: Lymphedema Life Impact Scale   Score:  Initial: 44 Most Recent: X (Date: -- )   Interpretation of Score: The Lymphedema Life Impact Scale (LLIS) is a validated instrument that measures the physical, functional, and psychosocial concerns pertinent to patients with extremity lymphedema. The Scale's questionnaire is administered to patients to gauge impairments, activity limitations, and participation restrictions resulting from their lymphedema. Score 0 1-13 14-26 27-40 41-54 55-67 68   Modifier CH CI CJ CK CL CM CN     ? Other PT/OT Primary Functional Limitations:     - CURRENT STATUS: CL - 60%-79% impaired, limited or restricted    - GOAL STATUS: CK - 40%-59% impaired, limited or restricted    - D/C STATUS:  ---------------To be determined---------------       Medical Necessity:   · Skilled intervention continues to be required due to uncontrolled swelling increasing risk of cellulitis and hindering ADL's. Reason for Services/Other Comments:  · Patient continues to require skilled intervention due to inability to self manage lymphedema at this time.   Clinical Decision-Making Assessment:     Use of outcome tool(s) and clinical judgement create a POC that gives a: Questionable prediction of patient's progress: MODERATE COMPLEXITY   TREATMENT:   (In addition to Assessment/Re-Assessment sessions the following treatments were rendered)    Pre-treatment Symptoms/Complaints:  Pt wearing class 1 socks and has not had any issues with her skin to date  Pain: Initial:   Pain Intensity 1: 2  Post Session:  2   Occupational Therapy Treatments:    Therapeutic Exercise ( 15 minutes): NuStep x 10 minutes, set at resistance level 1   HEP:  As above; handouts given to patient for all exercises. Manual Therapy:(45 minutes)  Manual lymphatic drainage of trunk and bilateral lower extremities; diaphragmatic breathing to facilitate pumping of the thoracic duct. Application of Eucerin lotion. Reapplied compression socks today           Manual Lymph Drainage:           Lymph Nodes:    Cervical Supraclavicular Axillary Abdominal Inguinal Popliteal Antecubital   RIGHT [x]     [x]     [x]     [x]     [x]     [x]     []       LEFT [x]     [x]     [x]     [x]     [x]     [x]     []          Anastamoses:   Axillo-axillary Inguino-inguinal Axillo-inguinal Inguino-axillary   ANTERIOR []     []     []     [x]       POSTERIOR []     []     []     []       RIGHT []     []     []     [x]       LEFT []     []     []     [x]         Limbs:   []    RUE     []    LUE     -    RLE    -    LLE   ADL/Self Care:( min):     Treatment/Session Assessment:    · Response to Treatment:  Pt tolerated without complication. Sigvaris compression knee highs are working well without irritating patient's skin  · Compliance with Program/Exercises: Good compliance  · Recommendations/Intent for next treatment session: \"Next visit will focus on complete decongestive therapy - reduction phase \".   Total Treatment Duration: 60min  OT Patient Time In/Time Out  Time In: 1230  Time Out: 0130    Fabian Penn OTR/L, CLT          Visit Approval Visit # Therapist initials Date A NS / Cx < 24 hr >24 hr Cx Comments   Medicare/BCBS Supplement 1 HG 4.4.22 [x]  [] [] Initial evaluation    2 wg 4.14.22 [x] [] [] 3M; 1ADL    3 wg 4.18.22 [x] [] [] 3M;1ADL    4 wg 4.21.22 [x] [] [] 2M;2ADL    5 wg 4.25.22 [x] [] [] 3M;1ADL    6 wg 4.28.22 [x] [] [] 3M; 1TE       [] [] []        [] [] []        [] [] []        [] [] []        [] [] []        [] [] []        [] [] []        [] [] []        [] [] []        [] [] []        [] [] []        [] [] []

## 2022-05-02 ENCOUNTER — HOSPITAL ENCOUNTER (OUTPATIENT)
Dept: PHYSICAL THERAPY | Age: 72
Discharge: HOME | End: 2022-05-02
Payer: MEDICARE

## 2022-05-02 PROCEDURE — 97140 MANUAL THERAPY 1/> REGIONS: CPT

## 2022-05-02 PROCEDURE — 97535 SELF CARE MNGMENT TRAINING: CPT

## 2022-05-02 NOTE — PROGRESS NOTES
[facility]  : 1950  [place]  [place] [address]  at [facility]  [address], [facility], [address]  [phone]   [phone]           OUTPATIENT OCCUPATIONAL THERAPY: Daily Note 2022    ICD-10: Treatment Diagnosis: Q82.0 hereditary or primary lymphedema, I89.0 lymphedema not elsewhere classified, M79.609 pain in limbs   Precautions/Allergies:   Latex, Other food, Atorvastatin, Azo pms [black cohosh-chaste-am.ginseng], Codeine, Colchicine, Hydrocodone, Nickel, Oxycodone, Phenazopyridine, Protonix [pantoprazole], and Statins-hmg-coa reductase inhibitors   Fall Risk Score:    Ambulatory/Rehab Services H2 Model Falls Risk Assessment    Risk Factors:       No Risk Factors Identified Ability to Rise from Chair:       (1)  Pushes up, successful in one attempt    Falls Prevention Plan:       No modifications necessary   Total: (5 or greater = High Risk): 1     [facility] [employer]. All Rights Reserved. [facility] Patent #[id number]. Federal Law prohibits the replication, distribution or use without written permission from [employer]     MD Orders: eval and treat MEDICAL/REFERRING DIAGNOSIS:   Lymphedema, not elsewhere classified [I89.0]   DATE OF ONSET: chronic  REFERRING PHYSICIAN: Savanah Hoang MD  RETURN PHYSICIAN APPOINTMENT: to be determined      INITIAL ASSESSMENT:  Ms. Michelle Arredondo was referred to OT for the evaluation and treatment of bilateral LE lipolymphedema with greater involvement in the LLE. Contributing to LE swelling is a genetic and family (mother, sister, grandmother) history as well as multiple comorbidities including morbid obesity, cardiac issues and CHF. She states swelling has been present in some degree since the birth of her first child in 65 at the age of 30.  Swelling in the LLE has gotten progressively worse over the last year with foot and toes now being involved. At times her left foot is so large she can not wear a shoe. Swelling is worse by the end of the day and is historically worse in the summer and with travel. LE's are tender to the touch. She has tried multiple compression garments over the years and is unable to tolerate due to an allergic reaction to the fabric. She watches her salt intake and elevates her legs during the day. She presents with stage 1-2 lipolymphedema LLE>RLE, LE tenderness/pain with tourniquet appearance around ankles bilaterally. She would benefit from skilled OT for complete decongestive therapy to decrease LE swelling and explore alternate compression options. PLAN OF CARE:   PROBLEM LIST:  1. Increased Pain  2. Decreased Activity Tolerance  3. Edema/Girth  4. Decreased Knowledge of Precautions  5. Decreased Skin Integrity/Hygeine  6. Decreased [place] with Home Exercise Program INTERVENTIONS PLANNED  1. Skin care  2. Compression bandaging  3. Fitting for compression garment(s)  4. Manual therapy/Manual lymph drainage  5. Therapeutic exercise/Therapeutic activities  6. Patient Education  7. Compression pump trial prn  8.  kinesiotaping    TREATMENT PLAN:  Effective Dates: 4/4/2022 to 7/3/2022 Frequency/Duration: 2x/week up to 90 days  2 xweek x90 days  and upon reassessment. Will adjust frequency and duration as progress indicates. GOALS: (Goals have been discussed and agreed upon with patient.)  Short-Term Functional Goals: Time Frame: 45 days  1. The patient/caregiver will verbalize understanding of lymphedema precautions. 2. Patient will be independent with skin care regimen to decrease risk of cellulitis. 3. The patient/caregiver will be independent with self-manual lymph drainage techniques and show decrease in limb volume. 4. Patient will be independent in lymphatic exercises. Discharge Goals: Time Frame: 90 days  1.  Patient's bilateral lower extremity circumferential measurements will decrease on volumetric graph by 3-5cm to maximize functional use in ADL's.    2. The patient/caregiver will be independent with home management of lymphedema. 3. Patient/caregiver will be independent donning and doffing bilateral lower extremity compression garment. Rehabilitation Potential For Stated Goals: Good              The information in this section was collected on 5/2/2022   (except where otherwise noted). OCCUPATIONAL PROFILE & HISTORY:   History of Present Injury/Illness (Reason for Referral):  Ms. Ricky Spangler was referred to OT for the evaluation and treatment of bilateral LE lipolymphedema with greater involvement in the LLE. Contributing to LE swelling is a genetic and family (mother, sister, grandmother) history as well as multiple comorbidities including morbid obesity, cardiac issues and CHF. She states swelling has been present in some degree since the birth of her first child in 65 at the age of 30. Swelling in the LLE has gotten progressively worse over the last year with foot and toes now being involved. At times her left foot is so large she can not wear a shoe. Swelling is worse by the end of the day and is historically worse in the summer and with travel. LE's are tender to the touch. She has tried multiple compression garments over the years and is unable to tolerate due to an allergic reaction to the fabric. She watches her salt intake and elevates her legs during the day. She presents with stage 1-2 lipolymphedema LLE>RLE, LE tenderness/pain with tourniquet appearance around ankles bilaterally. She would benefit from skilled OT for complete decongestive therapy to decrease LE swelling and explore alternate compression options.    Past Medical History/Comorbidities:   Ms. Ricky Spangler  has a past medical history of Arrhythmia, Arthritis, CAD (coronary artery disease), Cancer ([facility] [address] Congestive heart failure ([facility] [address] Essential hypertension, GERD (gastroesophageal reflux disease), Hyperlipidemia, Morbid obesity ([address] Nausea & vomiting, Sleep apnea, and Vertigo. She has no past medical history of Aneurysm ([facility] [address] Asthma, Autoimmune disease ([facility] [address] Chronic kidney disease, Chronic obstructive pulmonary disease ([address] Chronic pain, Coagulation disorder ([facility] [address] Diabetes ([facility] [address] Endocarditis, Liver disease, Psychiatric disorder, PUD (peptic ulcer disease), Rheumatic fever, Seizures ([facility] [address] Stroke ([facility] [address] Thromboembolus ([facility] [address] or Thyroid disease. Ms. Rebel Gonzalez  has a past surgical history that includes hx gastric bypass; hx hysterectomy; hx cholecystectomy; hx tonsillectomy; hx orthopaedic; and hx orthopaedic. Social History/Living Environment:     Pt lives in one level home with spouse   Prior Level of Function/Work/Activity:  Retired teacher   Dominant Side:         RIGHT  Previous Treatment Approaches:          Various compression garments, Lasix - no therapy for lymphedema to date   Current Medications:    Current Outpatient Medications:     ARIPiprazole (ABILIFY) 5 mg tablet, Take 5 mg by mouth daily. , Disp: , Rfl:     evolocumab (REPATHA SURECLICK) pen injection, 140 mg by SubCUTAneous route., Disp: , Rfl:     omeprazole (PRILOSEC) 20 mg capsule, daily. , Disp: , Rfl:     clopidogreL (PLAVIX) 75 mg tab, Take 1 Tablet by mouth daily. (Patient not taking: Reported on 4/20/2022), Disp: 30 Tablet, Rfl: 6    buPROPion XL (WELLBUTRIN XL) 300 mg XL tablet, Take 300 mg by mouth daily. , Disp: , Rfl:     naltrexone (DEPADE) 50 mg tablet, Take 25 mg by mouth two (2) times a day., Disp: , Rfl:     metoprolol succinate (TOPROL-XL) 25 mg XL tablet, Take 0.5 Tablets by mouth daily. 1/2 qd, Disp: 90 Tablet, Rfl: 3    aspirin delayed-release 81 mg tablet, Take 1 Tablet by mouth daily. , Disp: 30 Tablet, Rfl: 0    nabumetone (RELAFEN) 750 mg tablet, Take 750 mg by mouth as needed. (Patient not taking: Reported on 4/20/2022), Disp: , Rfl:     furosemide (Lasix) 40 mg tablet, Take 1 Tablet by mouth daily.  (Patient taking differently: Take 40 mg by mouth as needed.), Disp: 90 Tablet, Rfl: 3    potassium chloride SR (KLOR-CON 10) 10 mEq tablet, Take 1 Tablet by mouth daily. (Patient taking differently: Take 10 mEq by mouth as needed.), Disp: 90 Tablet, Rfl: 3    nitroglycerin (NITROSTAT) 0.4 mg SL tablet, 1 Tablet by SubLINGual route every five (5) minutes as needed for Chest Pain., Disp: 25 Tablet, Rfl: 11    polyethylene glycol (Miralax) 17 gram/dose powder, Take 17 g by mouth daily. , Disp: , Rfl:     multivitamin (ONE A DAY) tablet, Take 1 Tab by mouth daily. , Disp: , Rfl:     estradiol (ESTRACE) 2 mg tablet, Take 2 mg by mouth daily. , Disp: , Rfl:     fluticasone (FLONASE) 50 mcg/actuation nasal spray, 2 Sprays by Both Nostrils route as needed. , Disp: , Rfl:     montelukast (SINGULAIR) 10 mg tablet, Take 10 mg by mouth daily. , Disp: , Rfl:             Date Last Reviewed:  5/2/2022   Complexity Level : Expanded review of therapy/medical records (1-2):  MODERATE COMPLEXITY   ASSESSMENT OF OCCUPATIONAL PERFORMANCE:   Palpation:          Stage 1 RLE with soft, pitting fluid, improves with elevation        Stage 1-2 LLE with dense pitting/nonpitting fluid/hardening of the tissue, little to no improvement with elevation - tender to touch, dorsal hump, large accumulation of fluid around ankle   ROM:          WFL  Strength:          WFL  Special Tests:          Stemmer sign positive LLE, negative RLE  Skin Integrity:          Skin is intact with skin dryness present  Sensation:  Intact   Functional Mobility:  Independent with decreased activity tolerance   Activities of Daily Living :independent   Edema/Girth:  1+ and 2+   PRETREATMENT AFFECTED LIMB(s): right lower extremity  left lower extremity      Date:  4-4-22 4.21.22        Right / Left           Groin   []      []           8 inches   []      []           4 inches   []      []         PoplitealSpace   [x]      [x] 42.5/48 37/39.5         8 inches   [x]      [x] 43/43.5 40/41.5         4 inches   [x]      [x] 33.5/34 30/32         Ankle   [x]      [x] 27.5/30.5 24/23         Instep   [x]      [x] 22.5/23 18/20       Measurements are taken in centimeters:  2.54 cm = 1 inch  Total:right 169cm 149       left 179cm 156             Physical Skills Involved:  1. Activity Tolerance  2. Edema  3. Skin Integrity  4. pain Cognitive Skills Affected (resulting in the inability to perform in a timely and safe manner):  1. Psychosocial Skills Affected:  1. Habits/Routines   Number of elements that affect the Plan of Care: 3-5:  MODERATE COMPLEXITY   CLINICAL DECISION MAKING:   Outcome Measure: Tool Used: Tool Used: Lymphedema Life Impact Scale   Score:  Initial: 44 Most Recent: X (Date: -- )   Interpretation of Score: The Lymphedema Life Impact Scale (LLIS) is a validated instrument that measures the physical, functional, and psychosocial concerns pertinent to patients with extremity lymphedema. The Scale's questionnaire is administered to patients to gauge impairments, activity limitations, and participation restrictions resulting from their lymphedema. Score 0 1-13 14-26 27-40 41-54 55-67 68   Modifier CH CI CJ CK CL CM CN     ? Other PT/OT Primary Functional Limitations:     - CURRENT STATUS: CL - 60%-79% impaired, limited or restricted    - GOAL STATUS: CK - 40%-59% impaired, limited or restricted    - D/C STATUS:  ---------------To be determined---------------       Medical Necessity:   · Skilled intervention continues to be required due to uncontrolled swelling increasing risk of cellulitis and hindering ADL's. Reason for Services/Other Comments:  · Patient continues to require skilled intervention due to inability to self manage lymphedema at this time.   Clinical Decision-Making Assessment:     Use of outcome tool(s) and clinical judgement create a POC that gives a: Questionable prediction of patient's progress: MODERATE COMPLEXITY   TREATMENT:   (In addition to Assessment/Re-Assessment sessions the following treatments were rendered)    Pre-treatment Symptoms/Complaints: \"My legs broke out with the (compression) socks. I took them off and rebandaged myself\"  Pain: Initial:   Pain Intensity 1: 2  Post Session:  2   Occupational Therapy Treatments:    Therapeutic Exercise (  minutes):    HEP:  As above; handouts given to patient for all exercises. Manual Therapy:(35 minutes)  Manual lymphatic drainage of trunk and bilateral lower extremities; diaphragmatic breathing to facilitate pumping of the thoracic duct. Application of Eucerin lotion. Measured with gain of all girth that was reduced previously. Manual Lymph Drainage:           Lymph Nodes:    Cervical Supraclavicular Axillary Abdominal Inguinal Popliteal Antecubital   RIGHT [x]     [x]     [x]     [x]     [x]     [x]     []       LEFT [x]     [x]     [x]     [x]     [x]     [x]     []          Anastamoses:   Axillo-axillary Inguino-inguinal Axillo-inguinal Inguino-axillary   ANTERIOR []     []     []     [x]       POSTERIOR []     []     []     []       RIGHT []     []     []     [x]       LEFT []     []     []     [x]         Limbs:   []    RUE     []    LUE     -    RLE    -    LLE   ADL/Self Care:(25 min): Bilateral LE compression bandaging using Biagrip and 2 short stretch bandages on each leg. Komprex II pad on left lateral malleoli due to fluid pocket. Treatment/Session Assessment:    · Response to Treatment:  Pt tolerated without complication. Sigvaris compression knee highs caused skin irritation. May have to wear Yuvonne Fam for long term compression management due to skin sensitivities  · Compliance with Program/Exercises: Good compliance  · Recommendations/Intent for next treatment session: \"Next visit will focus on complete decongestive therapy - reduction phase \".   Total Treatment Duration: 60min  OT Patient Time In/Time Out  Time In: 1230  Time Out: 0130    YUVAL Prasad/GILL, CLT Visit Approval Visit # Therapist initials Date A NS / Cx < 24 hr >24 hr Cx Comments   Medicare/BCBS Supplement 1 HG 4.4.22 [x]  [] [] Initial evaluation    2 wg 4.14.22 [x] [] [] 3M; 1ADL    3 wg 4.18.22 [x] [] [] 3M;1ADL    4 wg 4.21.22 [x] [] [] 2M;2ADL    5 wg 4.25.22 [x] [] [] 3M;1ADL    6 wg 4.28.22 [x] [] [] 3M; 1TE    7 wg 5.2.22 [x] [] [] 2M;2ADL       [] [] []        [] [] []        [] [] []        [] [] []        [] [] []        [] [] []        [] [] []        [] [] []        [] [] []        [] [] []        [] [] []

## 2022-05-05 ENCOUNTER — HOSPITAL ENCOUNTER (OUTPATIENT)
Dept: PHYSICAL THERAPY | Age: 72
Discharge: HOME | End: 2022-05-05
Payer: MEDICARE

## 2022-05-05 PROCEDURE — 97535 SELF CARE MNGMENT TRAINING: CPT

## 2022-05-05 PROCEDURE — 97140 MANUAL THERAPY 1/> REGIONS: CPT

## 2022-05-05 NOTE — THERAPY DISCHARGE
[facility]  : 1950  [place]  [place] [address] [facility] Therapy  [address], [address]  [phone]   [phone]           OUTPATIENT OCCUPATIONAL THERAPY: Daily Note and Discharge 2022    ICD-10: Treatment Diagnosis: Q82.0 hereditary or primary lymphedema, I89.0 lymphedema not elsewhere classified, M79.609 pain in limbs   Precautions/Allergies:   Latex, Other food, Atorvastatin, Azo pms [black cohosh-chaste-am.ginseng], Codeine, Colchicine, Hydrocodone, Nickel, Oxycodone, Phenazopyridine, Protonix [pantoprazole], and Statins-hmg-coa reductase inhibitors   Fall Risk Score:    Ambulatory/Rehab Services H2 Model Falls Risk Assessment    Risk Factors:       No Risk Factors Identified Ability to Rise from Chair:       (1)  Pushes up, successful in one attempt    Falls Prevention Plan:       No modifications necessary   Total: (5 or greater = High Risk): 1     [facility] [employer]ious Business. All Rights Reserved. [facility] Patent #[id number]. Federal Law prohibits the replication, distribution or use without written permission from [employer]     MD Orders: eval and treat MEDICAL/REFERRING DIAGNOSIS:   Lymphedema, not elsewhere classified [I89.0]   DATE OF ONSET: chronic  REFERRING PHYSICIAN: Savanah Hoang MD  RETURN PHYSICIAN APPOINTMENT: to be determined    DISCHARGE:  Ms. Michelle Arredondo was treated 8 times from 22 through 22. Pt was educated on lymphatic pathophysiology, complete decongestive therapy, precautions and skin integrity management. She received manual lymphatic drainage and compression bandaging. Pt was instructed in self MLD and lymphatic pumping exercises. She tried to transition to cotton-based class 1 compression socks, due to skin hypersensitivity to fabrics and compression. This caused a skin reaction along the band.   She continued with bandaging and will keep trying various combinations of compression to find something that will work. A compression pump is in process. She has demonstrated reduction and stabilization of reduction. Maximum benefits achieved from therapy; pt discharged today. INITIAL ASSESSMENT:  Ms. Mary Anne Mock was referred to OT for the evaluation and treatment of bilateral LE lipolymphedema with greater involvement in the LLE. Contributing to LE swelling is a genetic and family (mother, sister, grandmother) history as well as multiple comorbidities including morbid obesity, cardiac issues and CHF. She states swelling has been present in some degree since the birth of her first child in [birth year] at the age of 30. Swelling in the LLE has gotten progressively worse over the last year with foot and toes now being involved. At times her left foot is so large she can not wear a shoe. Swelling is worse by the end of the day and is historically worse in the summer and with travel. LE's are tender to the touch. She has tried multiple compression garments over the years and is unable to tolerate due to an allergic reaction to the fabric. She watches her salt intake and elevates her legs during the day. She presents with stage 1-2 lipolymphedema LLE>RLE, LE tenderness/pain with tourniquet appearance around ankles bilaterally. She would benefit from skilled OT for complete decongestive therapy to decrease LE swelling and explore alternate compression options. PLAN OF CARE:   PROBLEM LIST:  1. Increased Pain  2. Decreased Activity Tolerance  3. Edema/Girth  4. Decreased Knowledge of Precautions  5. Decreased Skin Integrity/Hygeine  6. Decreased [place] with Home Exercise Program INTERVENTIONS PLANNED  1. Skin care  2. Compression bandaging  3. Fitting for compression garment(s)  4. Manual therapy/Manual lymph drainage  5. Therapeutic exercise/Therapeutic activities  6. Patient Education  7.   Compression pump trial prn  8.  kinesiotaping    TREATMENT PLAN:  Effective Dates: 4/4/2022 to 7/3/2022 Frequency/Duration: 2x/week up to 90 days  2 xweek x90 days  and upon reassessment. Will adjust frequency and duration as progress indicates. GOALS: (Goals have been discussed and agreed upon with patient.)  Short-Term Functional Goals: Time Frame: 45 days  1. The patient/caregiver will verbalize understanding of lymphedema precautions. Goal Met  2. Patient will be independent with skin care regimen to decrease risk of cellulitis. Goal Met  3. The patient/caregiver will be independent with self-manual lymph drainage techniques and show decrease in limb volume. Goal Met  4. Patient will be independent in lymphatic exercises. Goal Met  Discharge Goals: Time Frame: 90 days  1. Patient's bilateral lower extremity circumferential measurements will decrease on volumetric graph by 3-5cm to maximize functional use in ADL's. Goal Met  2. The patient/caregiver will be independent with home management of lymphedema. Goal Met  3. Patient/caregiver will be independent donning and doffing bilateral lower extremity compression garment. Goal Met    Rehabilitation Potential For Stated Goals: Good              The information in this section was collected on 5/5/2022   (except where otherwise noted). OCCUPATIONAL PROFILE & HISTORY:   History of Present Injury/Illness (Reason for Referral):  Ms. Destiny Devi was referred to OT for the evaluation and treatment of bilateral LE lipolymphedema with greater involvement in the LLE. Contributing to LE swelling is a genetic and family (mother, sister, grandmother) history as well as multiple comorbidities including morbid obesity, cardiac issues and CHF. She states swelling has been present in some degree since the birth of her first child in [address] at the age of 30. Swelling in the LLE has gotten progressively worse over the last year with foot and toes now being involved. At times her left foot is so large she can not wear a shoe.  Swelling is worse by the end of the day and is historically worse in the summer and with travel. LE's are tender to the touch. She has tried multiple compression garments over the years and is unable to tolerate due to an allergic reaction to the fabric. She watches her salt intake and elevates her legs during the day. She presents with stage 1-2 lipolymphedema LLE>RLE, LE tenderness/pain with tourniquet appearance around ankles bilaterally. She would benefit from skilled OT for complete decongestive therapy to decrease LE swelling and explore alternate compression options. Past Medical History/Comorbidities:   Ms. Joseph Llanes  has a past medical history of Arrhythmia, Arthritis, CAD (coronary artery disease), Cancer ([address] Congestive heart failure ([address] Essential hypertension, GERD (gastroesophageal reflux disease), Hyperlipidemia, Morbid obesity ([address] Nausea & vomiting, Sleep apnea, and Vertigo. She has no past medical history of Aneurysm ([address] Asthma, Autoimmune disease ([address] Chronic kidney disease, Chronic obstructive pulmonary disease ([address] Chronic pain, Coagulation disorder ([address] Diabetes ([address] Endocarditis, Liver disease, Psychiatric disorder, PUD (peptic ulcer disease), Rheumatic fever, Seizures ([address] Stroke ([address] Thromboembolus ([address] or Thyroid disease. Ms. Joseph Llanes  has a past surgical history that includes hx gastric bypass; hx hysterectomy; hx cholecystectomy; hx tonsillectomy; hx orthopaedic; and hx orthopaedic. Social History/Living Environment:     Pt lives in one level home with spouse   Prior Level of Function/Work/Activity:  Retired teacher   Dominant Side:         RIGHT  Previous Treatment Approaches:          Various compression garments, Lasix - no therapy for lymphedema to date   Current Medications:    Current Outpatient Medications:     ARIPiprazole (ABILIFY) 5 mg tablet, Take 5 mg by mouth daily. , Disp: , Rfl:     evolocumab (REPATHA SURECLICK) pen injection, 140 mg by SubCUTAneous route. , Disp: , Rfl:     omeprazole (PRILOSEC) 20 mg capsule, daily. , Disp: , Rfl:     clopidogreL (PLAVIX) 75 mg tab, Take 1 Tablet by mouth daily. (Patient not taking: Reported on 4/20/2022), Disp: 30 Tablet, Rfl: 6    buPROPion XL (WELLBUTRIN XL) 300 mg XL tablet, Take 300 mg by mouth daily. , Disp: , Rfl:     naltrexone (DEPADE) 50 mg tablet, Take 25 mg by mouth two (2) times a day., Disp: , Rfl:     metoprolol succinate (TOPROL-XL) 25 mg XL tablet, Take 0.5 Tablets by mouth daily. 1/2 qd, Disp: 90 Tablet, Rfl: 3    aspirin delayed-release 81 mg tablet, Take 1 Tablet by mouth daily. , Disp: 30 Tablet, Rfl: 0    nabumetone (RELAFEN) 750 mg tablet, Take 750 mg by mouth as needed. (Patient not taking: Reported on 4/20/2022), Disp: , Rfl:     furosemide (Lasix) 40 mg tablet, Take 1 Tablet by mouth daily. (Patient taking differently: Take 40 mg by mouth as needed.), Disp: 90 Tablet, Rfl: 3    potassium chloride SR (KLOR-CON 10) 10 mEq tablet, Take 1 Tablet by mouth daily. (Patient taking differently: Take 10 mEq by mouth as needed.), Disp: 90 Tablet, Rfl: 3    nitroglycerin (NITROSTAT) 0.4 mg SL tablet, 1 Tablet by SubLINGual route every five (5) minutes as needed for Chest Pain., Disp: 25 Tablet, Rfl: 11    polyethylene glycol (Miralax) 17 gram/dose powder, Take 17 g by mouth daily. , Disp: , Rfl:     multivitamin (ONE A DAY) tablet, Take 1 Tab by mouth daily. , Disp: , Rfl:     estradiol (ESTRACE) 2 mg tablet, Take 2 mg by mouth daily. , Disp: , Rfl:     fluticasone (FLONASE) 50 mcg/actuation nasal spray, 2 Sprays by Both Nostrils route as needed. , Disp: , Rfl:     montelukast (SINGULAIR) 10 mg tablet, Take 10 mg by mouth daily. , Disp: , Rfl:             Date Last Reviewed:  5/5/2022   Complexity Level : Expanded review of therapy/medical records (1-2):  MODERATE COMPLEXITY   ASSESSMENT OF OCCUPATIONAL PERFORMANCE:   Palpation:          Stage 1 RLE with soft, pitting fluid, improves with elevation        Stage 1-2 LLE with dense pitting/nonpitting fluid/hardening of the tissue, little to no improvement with elevation - tender to touch, dorsal hump, large accumulation of fluid around ankle   ROM:          WFL  Strength:          WFL  Special Tests:          Stemmer sign positive LLE, negative RLE  Skin Integrity:          Skin is intact with skin dryness present  Sensation:  Intact    Functional Mobility:  Independent with decreased activity tolerance   Activities of Daily Living :independent   Edema/Girth:  1+ and 2+   PRETREATMENT AFFECTED LIMB(s): right lower extremity  left lower extremity      Date:  4-4-22 4.21.22 5-5-22       Right / Left           Groin   []      []           8 inches   []      []           4 inches   []      []         PoplitealSpace   [x]      [x] 42.5/48 37/39.5 39.5/44        8 inches   [x]      [x] 43/43.5 40/41.5 41.5/45        4 inches   [x]      [x] 33.5/34 30/32 35/36.5        Ankle   [x]      [x] 27.5/30.5 24/23 27.5/29        Instep   [x]      [x] 22.5/23 18/20 23/22      Measurements are taken in centimeters:  2.54 cm = 1 inch  Total:right 169cm 149 166.5      left 179cm 156 176.5            Physical Skills Involved:  1. Activity Tolerance  2. Edema  3. Skin Integrity  4. pain Cognitive Skills Affected (resulting in the inability to perform in a timely and safe manner):  1. Psychosocial Skills Affected:  1. Habits/Routines   Number of elements that affect the Plan of Care: 3-5:  MODERATE COMPLEXITY   CLINICAL DECISION MAKING:   Outcome Measure: Tool Used: Tool Used: Lymphedema Life Impact Scale   Score:  Initial: 44 Most Recent: 31 (Date: 5/5/22 )   Interpretation of Score: The Lymphedema Life Impact Scale (LLIS) is a validated instrument that measures the physical, functional, and psychosocial concerns pertinent to patients with extremity lymphedema.   The Scale's questionnaire is administered to patients to gauge impairments, activity limitations, and participation restrictions resulting from their lymphedema. Score 0 1-13 14-26 27-40 41-54 55-67 68   Modifier CH CI CJ CK CL CM CN     ? Other PT/OT Primary Functional Limitations:     - CURRENT STATUS: CL - 60%-79% impaired, limited or restricted    - GOAL STATUS: CK - 40%-59% impaired, limited or restricted    - D/C STATUS:  CK - 40%-59% impaired, limited or restricted       Medical Necessity:   · Skilled intervention continues to be required due to uncontrolled swelling increasing risk of cellulitis and hindering ADL's. Reason for Services/Other Comments:  · Patient continues to require skilled intervention due to inability to self manage lymphedema at this time. Clinical Decision-Making Assessment:     Use of outcome tool(s) and clinical judgement create a POC that gives a: Questionable prediction of patient's progress: MODERATE COMPLEXITY   TREATMENT:   (In addition to Assessment/Re-Assessment sessions the following treatments were rendered)    Pre-treatment Symptoms/Complaints: Pt reduced 2.5 cm each leg with most recent measurement  Pain: Initial:   Pain Intensity 1: 0  Post Session:  0   Occupational Therapy Treatments:    Therapeutic Exercise (  minutes):    HEP:  As above; handouts given to patient for all exercises. Manual Therapy:(45 minutes)  Manual lymphatic drainage of trunk and bilateral lower extremities; diaphragmatic breathing to facilitate pumping of the thoracic duct. Application of Eucerin lotion. Measured with gain of all girth that was reduced previously.           Manual Lymph Drainage:           Lymph Nodes:    Cervical Supraclavicular Axillary Abdominal Inguinal Popliteal Antecubital   RIGHT [x]     [x]     [x]     [x]     [x]     [x]     []       LEFT [x]     [x]     [x]     [x]     [x]     [x]     []          Anastamoses:   Axillo-axillary Inguino-inguinal Axillo-inguinal Inguino-axillary   ANTERIOR []     []     []     [x]       POSTERIOR []     []     []     []       RIGHT []     []     [] [x]       LEFT []     []     []     [x]         Limbs:   []    RUE     []    LUE     -    RLE    -    LLE   ADL/Self Care:(15 min): Bilateral LE compression bandaging using Biagrip and 2 short stretch bandages on each leg. Komprex II pad on left lateral malleoli due to fluid pocket. Treatment/Session Assessment:    · Response to Treatment:  Pt tolerated without complication. She is independent with self management. She will wear compression in limited amounts of time to reduce skin irritation. Pump is in progress. Pt with primary lymphedema.   · Compliance with Program/Exercises: Good compliance  · Recommendations/Intent for next treatment session: Discharge today  Total Treatment Duration: 60min  OT Patient Time In/Time Out  Time In: 1230  Time Out: 0130    Vikki Steward OTR/L, CLT          Visit Approval Visit # Therapist initials Date A NS / Cx < 24 hr >24 hr Cx Comments   Medicare/BCBS Supplement 1 HG 4.4.22 [x]  [] [] Initial evaluation    2 wg 4.14.22 [x] [] [] 3M; 1ADL    3 wg 4.18.22 [x] [] [] 3M;1ADL    4 wg 4.21.22 [x] [] [] 2M;2ADL    5 wg 4.25.22 [x] [] [] 3M;1ADL    6 wg 4.28.22 [x] [] [] 3M; 1TE    7 wg 5.2.22 [x] [] [] 2M;2ADL    8 wg 5.5.22 [x] [] [] 3M;1ADL       [] [] []        [] [] []        [] [] []        [] [] []        [] [] []        [] [] []        [] [] []        [] [] []        [] [] []        [] [] []

## 2022-06-27 ENCOUNTER — APPOINTMENT (OUTPATIENT)
Dept: PHYSICAL THERAPY | Age: 72
End: 2022-06-27
Payer: MEDICARE

## 2022-06-30 ENCOUNTER — HOSPITAL ENCOUNTER (OUTPATIENT)
Dept: PHYSICAL THERAPY | Age: 72
Setting detail: RECURRING SERIES
Discharge: HOME | End: 2022-07-03
Payer: MEDICARE

## 2022-06-30 PROCEDURE — 97535 SELF CARE MNGMENT TRAINING: CPT

## 2022-06-30 PROCEDURE — 97166 OT EVAL MOD COMPLEX 45 MIN: CPT

## 2022-06-30 ASSESSMENT — PAIN SCALES - GENERAL: PAINLEVEL_OUTOF10: 6

## 2022-06-30 NOTE — THERAPY EVALUATION
recordedNo data recordedNo data recorded   Learning:   Does the patient/guardian have any barriers to learning?: No barriers  Will there be a co-learner?: No  What is the preferred language of the patient/guardian?: English  Is an  required?: No  How does the patient/guardian prefer to learn new concepts?: Listening; Reading; Demonstration     Fall Risk Scale  No data recorded         OBJECTIVE   Palpation  B LE lipedema with lymphedema, soft, pitting edema especially around ankles    [facility]    Strength  WFL    Special Tests   Negative Stemmer's sign (inability to pinch skin at base of second toe)     Skin Integrity  Intact    Sensation  Intact    Activities of Daily Living  Independent    Edema/Girth  Cumulative circumferential volumetric graph measurements from knee to foot:  R .5 cm      L LE:  168.5 cm      ASSESSMENT   Initial Assessment:  Pt reports she has been doing aquatic exercise and had noted a significant difference in leg girth. She completed lymphatic therapy about 4 to 6 weeks ago, with good results; however, she was unable to tolerate any compression garments due to multiple skin sensitivities. She has seen Dr. Melyssa Tao since completing therapy, and Dr. Melyssa Tao referred her here to try [address] for compression    Problem List (Impacting functional limitations):    Decreased activity tolerance     Edema/girth    Decreased skin integrity/hygiene    Limb heaviness    Therapy Prognosis:   Prognosis: Good     Assessment Complexity:   Medium Complexity  PLAN   Effective Dates: 9/24/02  TO Certification Period Expiration Date: 08/29/22   . Frequency/Duration: Return as needed    Interventions Planned: (Treatment may consist of any combination of the following)  Self Care/ADLs  Goals: (Goals have been discussed and agreed upon with patient.)  Short-Term Functional Goals: Time Frame: 30 days  1.  Patient will be fitted for appropriate compression management bilateral lower extremities. Discharge Goals: Time Frame: 60 days  1. Patient will be independent with compression garment management         Outcome Measure: Tool Used: Lymphedema Life Impact Scale   Score:  Initial: 33 Most Recent: X (Date: -- )   Interpretation of Score: The Lymphedema Life Impact Scale (LLIS) is a validated instrument that measures the physical, functional, and psychosocial concerns pertinent to patients with extremity lymphedema. The Scale's questionnaire is administered to patients to gauge impairments, activity limitations, and participation restrictions resulting from their lymphedema. MEDICAL NECESSITY:   Skilled intervention continues to be required due to compression garment needs to manage lymphedema. REASON FOR SERVICES/OTHER COMMENTS:  Patient continues to require skilled intervention due to need to find compression garments for independent management. Total Duration:  Time In: 1330  Time Out: 1410    Regarding Mery Dixon's therapy, I certify that the treatment plan above will be carried out by a therapist or under their direction.   Thank you for this referral,  Ivy Lobo OT     Referring Physician Signature: Najma Morales MD _______________________________ Date _____________        Post Session Pain  Charge Capture   POC Link  Treatment Note Link  MD Guidelines  [email or web address]

## 2022-06-30 NOTE — PROGRESS NOTES
Mery Armando Destiny  : 1950  Primary: Medicare Part A And B  Secondary:  [address] [address]  Phone: [phone]  Fax: [phone] No data recorded  Certification Period Expiration Date: 22      OT Visit Info: Total # of Visits to Date: 1      OUTPATIENT OCCUPATIONAL THERAPY: Treatment Note 2022  Appt Desk   Episode      Treatment Diagnosis:  I89, Lymphedema not elsewhere classified  M79.89, Swelling of bilateral lower extremities  Medical/Referring Diagnosis:  Lipedema [R60.9]  Referring Physician:  Shad Sanchez MD MD Orders:  OT Eval and Treat   Date of Onset:  Onset Date: 00     Allergies:  Latex, Atorvastatin, Codeine, Colchicine, Oxycodone, Pantoprazole, Phenazopyridine, and Nickel  Restrictions/Precautions:    No data recordedNo data recorded  Medications Last Reviewed:  2022     Interventions Planned: (Treatment may consist of any combination of the following)  No data recorded   Subjective Comments:     Initial:     6/10 Post Session:     6/10  Medications Last Reviewed:  2022  Updated Objective Findings:  See evaluation note from today  Treatment   SELF CARE: (10 minutes):   Patient measured for Solaris Ready Wrap to try for L LE. Ordered size large regular to be delivered to her home. She will try this and see if she can tolerate it with her skin sensitivities. If it works, she will order another for her right leg. If it doesn't work, she will return to [employer]    Treatment/Session Summary:    · Treatment Assessment:  Pt in agreement with POC and goals     · Communication/Consultation:  None today  · Equipment provided today:  Ordered Solaris Ready Wrap  · Recommendations/Intent for next treatment session: Next visit will focus on compression management needs.     Total Treatment Billable Duration: 40 minutes  OT Individual Minutes  Time In: 1330  Time Out: 6617  Minutes: [address], OT    Post Session Pain  Charge Capture   POC Link  Treatment Note Link  MD Guidelines  MyChart    Future Appointments   Date Time Provider Nomi Wright   8/19/2022  4:30 PM DO DENISSE Cormier [facility]B

## 2022-09-27 ENCOUNTER — OFFICE VISIT (OUTPATIENT)
Dept: CARDIOLOGY CLINIC | Age: 72
End: 2022-09-27
Payer: MEDICARE

## 2022-09-27 VITALS
HEIGHT: 63 IN | HEART RATE: 80 BPM | BODY MASS INDEX: 37.92 KG/M2 | DIASTOLIC BLOOD PRESSURE: 84 MMHG | SYSTOLIC BLOOD PRESSURE: 138 MMHG | WEIGHT: 214 LBS

## 2022-09-27 DIAGNOSIS — I25.10 CORONARY ARTERY DISEASE INVOLVING NATIVE CORONARY ARTERY OF NATIVE HEART WITHOUT ANGINA PECTORIS: ICD-10-CM

## 2022-09-27 DIAGNOSIS — I48.0 PAROXYSMAL ATRIAL FIBRILLATION (HCC): Primary | ICD-10-CM

## 2022-09-27 DIAGNOSIS — I50.22 SYSTOLIC CHF, CHRONIC (HCC): ICD-10-CM

## 2022-09-27 DIAGNOSIS — I42.8 NICM (NONISCHEMIC CARDIOMYOPATHY) (HCC): ICD-10-CM

## 2022-09-27 PROCEDURE — G8427 DOCREV CUR MEDS BY ELIG CLIN: HCPCS | Performed by: INTERNAL MEDICINE

## 2022-09-27 PROCEDURE — G8417 CALC BMI ABV UP PARAM F/U: HCPCS | Performed by: INTERNAL MEDICINE

## 2022-09-27 PROCEDURE — G8400 PT W/DXA NO RESULTS DOC: HCPCS | Performed by: INTERNAL MEDICINE

## 2022-09-27 PROCEDURE — 1036F TOBACCO NON-USER: CPT | Performed by: INTERNAL MEDICINE

## 2022-09-27 PROCEDURE — 3017F COLORECTAL CA SCREEN DOC REV: CPT | Performed by: INTERNAL MEDICINE

## 2022-09-27 PROCEDURE — 99214 OFFICE O/P EST MOD 30 MIN: CPT | Performed by: INTERNAL MEDICINE

## 2022-09-27 PROCEDURE — 1123F ACP DISCUSS/DSCN MKR DOCD: CPT | Performed by: INTERNAL MEDICINE

## 2022-09-27 PROCEDURE — 1090F PRES/ABSN URINE INCON ASSESS: CPT | Performed by: INTERNAL MEDICINE

## 2022-09-27 RX ORDER — MECLIZINE HYDROCHLORIDE 25 MG/1
25 TABLET ORAL 3 TIMES DAILY PRN
COMMUNITY

## 2022-09-27 RX ORDER — CLOBETASOL PROPIONATE 0.5 MG/G
CREAM TOPICAL 2 TIMES DAILY
COMMUNITY

## 2022-09-27 RX ORDER — ARIPIPRAZOLE 5 MG/1
5 TABLET ORAL DAILY
COMMUNITY

## 2022-09-27 RX ORDER — ALENDRONATE SODIUM 70 MG/1
70 TABLET ORAL
COMMUNITY

## 2022-09-27 RX ORDER — DICYCLOMINE HYDROCHLORIDE 10 MG/1
10 CAPSULE ORAL
COMMUNITY

## 2022-09-27 NOTE — PROGRESS NOTES
[address], [address]  PHONE: [phone]     22    NAME:  Bailey Dick  : 1950  MRN: [id number]       SUBJECTIVE:   Bailey Dick is a 74 y.o. female seen for a follow up visit regarding the following:     Chief Complaint   Patient presents with    Atrial Fibrillation     3 month f/u        HPI:    Here for NICM/SHF/pAF. Echo 2017: EF 25%, RVSP 60.   TE 2021: EF 45%, mod/severe pHTN   LHC 10/2021: Mild nonobstructive coronary artery disease   JATIN 10/2021: normal EF, mod TR   Watchman 10/2021     NO new plp, MCINTOSH ok now. No CP. Lasix PRN now. Not daily. Patient denies recent history of orthopnea, PND, excessive dizziness and/or syncope. 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 Had bypass in , lost over 100 pds. Intolerant to statins. Past Medical History, Past Surgical History, Family history, Social History, and Medications were all reviewed with the patient today and updated as necessary. Current Outpatient Medications   Medication Sig Dispense Refill    alendronate (FOSAMAX) 70 MG tablet Take 70 mg by mouth every 7 days      ARIPiprazole (ABILIFY) 5 MG tablet Take 5 mg by mouth daily      vitamin D (CHOLECALCIFEROL) 25 MCG (1000 UT) TABS tablet Take 1,000 Units by mouth daily      clobetasol (TEMOVATE) 0.05 % cream Apply topically 2 times daily Apply topically 2 times daily.       dicyclomine (BENTYL) 10 MG capsule Take 10 mg by mouth 4 times daily (before meals and nightly)      Evolocumab 140 MG/ML SOAJ Inject into the skin      meclizine (ANTIVERT) 25 MG tablet Take 25 mg by mouth 3 times daily as needed      aspirin 81 MG EC tablet Take 81 mg by mouth daily      buPROPion (WELLBUTRIN XL) 300 MG extended release tablet Take 300 mg by mouth daily      estradiol (ESTRACE) 2 MG tablet Take 1 mg by mouth daily      fluticasone (FLONASE) 50 MCG/ACT nasal spray 2 sprays by Nasal route as needed      furosemide (LASIX) 40 MG tablet Take 40 mg by mouth daily      metoprolol succinate (TOPROL XL) 25 MG extended release tablet Take 12.5 mg by mouth daily      montelukast (SINGULAIR) 10 MG tablet Take 10 mg by mouth daily      naltrexone (DEPADE) 50 MG tablet Take 25 mg by mouth 2 times daily      nitroGLYCERIN (NITROSTAT) 0.4 MG SL tablet Place 0.4 mg under the tongue      omeprazole (PRILOSEC) 20 MG delayed release capsule TAKE ONE CAPSULE BY MOUTH EVERY OTHER DAY      polyethylene glycol (GLYCOLAX) 17 GM/SCOOP powder Take 17 g by mouth daily      potassium chloride (KLOR-CON) 10 MEQ extended release tablet Take 10 mEq by mouth daily       No current facility-administered medications for this visit.         Allergies   Allergen Reactions    Latex Rash    Atorvastatin Other (See Comments)    Codeine Itching    Colchicine Other (See Comments)     Muscle pain    Oxycodone Other (See Comments)    Pantoprazole Other (See Comments)     Muscle pain     Phenazopyridine Other (See Comments)     NOT ALLERGIC    Nickel Rash     Patient Active Problem List    Diagnosis Date Noted    Permanent atrial fibrillation ([address] 10/19/2021     Priority: High    Cystitis 10/27/2021    IBS (irritable bowel syndrome) 10/27/2021    Chest pain 10/25/2021    Snoring 11/07/2018    Severe obesity ([address] 11/07/2018    Pulmonary HTN ([address] 09/18/2017    NICM (nonischemic cardiomyopathy) ([address] 07/13/2017    Takotsubo cardiomyopathy 05/30/2017    Sick sinus syndrome ([address] 11/15/2016    Paroxysmal atrial fibrillation ([address] 11/15/2016    Coronary artery disease involving native coronary artery of native heart without angina pectoris 10/11/2016    Shortness of breath 09/27/2016    Chronic fatigue 65/73/5002    Systolic CHF, chronic ([address] 09/27/2016    Vitamin D deficiency 09/14/2016    Edema 09/14/2016    Arthralgia of multiple joints 09/14/2016    Herpes zoster 09/14/2016    Esophageal reflux 09/14/2016    Malignant melanoma of skin of upper limb, including shoulder ([facility] 09/14/2016    Hypercholesteremia 09/14/2016    Hypertension 09/14/2016      Past Surgical History:   Procedure Laterality Date    CHOLECYSTECTOMY      GASTRIC BYPASS SURGERY      HYSTERECTOMY (CERVIX STATUS UNKNOWN)      ORTHOPEDIC SURGERY      bilateral hand surgeries    ORTHOPEDIC SURGERY      right rotator cuff    TONSILLECTOMY       Family History   Problem Relation Age of Onset    Sudden Death Father     Stroke Father     Heart Attack Father     Coronary Art Dis Father      Social History     Tobacco Use    Smoking status: Never    Smokeless tobacco: Never   Substance Use Topics    Alcohol use: No         ROS:    No obvious pertinent positives on review of systems except for what was outlined in the HPI today.       PHYSICAL EXAM:     /84   Pulse 80   Ht 5' 3\" (1.6 m)   Wt 214 lb (97.1 kg)   BMI 37.91 kg/m²    General/Constitutional:   Alert and oriented x 3, no acute distress  HEENT:   normocephalic, atraumatic, moist mucous membranes  Neck:   No JVD or carotid bruits bilaterally  Cardiovascular:   regular rate and rhythm, no murmur/rub/gallop appreciated  Pulmonary:   clear to auscultation bilaterally, no respiratory distress  Abdomen:   soft, non-tender, non-distended  Ext:   No sig LE edema bilaterally  Skin:  warm and dry, no obvious rashes seen  Neuro:   no obvious sensory or motor deficits  Psychiatric:   normal mood and affect      Lab Results   Component Value Date/Time     12/10/2021 10:17 AM    K 3.9 12/10/2021 10:17 AM     12/10/2021 10:17 AM    CO2 27 12/10/2021 10:17 AM    BUN 17 12/10/2021 10:17 AM    CREATININE 0.93 12/10/2021 10:17 AM    GLUCOSE 89 12/10/2021 10:17 AM    CALCIUM 9.2 12/10/2021 10:17 AM        Lab Results   Component Value Date    WBC 7.3 12/10/2021    HGB 11.4 (L) 12/10/2021    HCT 36.9 12/10/2021    MCV 90.9 12/10/2021     12/10/2021       No results found for: TSHFT4, TSH    No results found for: LABA1C  No results found for:

## 2023-10-24 ENCOUNTER — OFFICE VISIT (OUTPATIENT)
Age: 73
End: 2023-10-24
Payer: MEDICARE

## 2023-10-24 VITALS
WEIGHT: 209 LBS | BODY MASS INDEX: 35.68 KG/M2 | SYSTOLIC BLOOD PRESSURE: 114 MMHG | DIASTOLIC BLOOD PRESSURE: 66 MMHG | HEART RATE: 60 BPM | HEIGHT: 64 IN

## 2023-10-24 DIAGNOSIS — I25.10 CORONARY ARTERY DISEASE INVOLVING NATIVE CORONARY ARTERY OF NATIVE HEART WITHOUT ANGINA PECTORIS: ICD-10-CM

## 2023-10-24 DIAGNOSIS — I48.0 PAROXYSMAL ATRIAL FIBRILLATION (HCC): Primary | ICD-10-CM

## 2023-10-24 DIAGNOSIS — I10 PRIMARY HYPERTENSION: ICD-10-CM

## 2023-10-24 DIAGNOSIS — I50.22 SYSTOLIC CHF, CHRONIC (HCC): ICD-10-CM

## 2023-10-24 DIAGNOSIS — I42.8 NICM (NONISCHEMIC CARDIOMYOPATHY) (HCC): ICD-10-CM

## 2023-10-24 DIAGNOSIS — E78.00 HYPERCHOLESTEREMIA: ICD-10-CM

## 2023-10-24 PROCEDURE — 3017F COLORECTAL CA SCREEN DOC REV: CPT | Performed by: INTERNAL MEDICINE

## 2023-10-24 PROCEDURE — 1123F ACP DISCUSS/DSCN MKR DOCD: CPT | Performed by: INTERNAL MEDICINE

## 2023-10-24 PROCEDURE — 3078F DIAST BP <80 MM HG: CPT | Performed by: INTERNAL MEDICINE

## 2023-10-24 PROCEDURE — G8400 PT W/DXA NO RESULTS DOC: HCPCS | Performed by: INTERNAL MEDICINE

## 2023-10-24 PROCEDURE — G8484 FLU IMMUNIZE NO ADMIN: HCPCS | Performed by: INTERNAL MEDICINE

## 2023-10-24 PROCEDURE — 99214 OFFICE O/P EST MOD 30 MIN: CPT | Performed by: INTERNAL MEDICINE

## 2023-10-24 PROCEDURE — G8417 CALC BMI ABV UP PARAM F/U: HCPCS | Performed by: INTERNAL MEDICINE

## 2023-10-24 PROCEDURE — 1036F TOBACCO NON-USER: CPT | Performed by: INTERNAL MEDICINE

## 2023-10-24 PROCEDURE — G8427 DOCREV CUR MEDS BY ELIG CLIN: HCPCS | Performed by: INTERNAL MEDICINE

## 2023-10-24 PROCEDURE — 1090F PRES/ABSN URINE INCON ASSESS: CPT | Performed by: INTERNAL MEDICINE

## 2023-10-24 PROCEDURE — 3074F SYST BP LT 130 MM HG: CPT | Performed by: INTERNAL MEDICINE

## 2023-10-24 NOTE — PROGRESS NOTES
daily      metoprolol succinate (TOPROL XL) 25 MG extended release tablet Take 0.5 tablets by mouth daily      montelukast (SINGULAIR) 10 MG tablet Take 1 tablet by mouth daily      naltrexone (DEPADE) 50 MG tablet Take 1 tablet by mouth daily      nitroGLYCERIN (NITROSTAT) 0.4 MG SL tablet Place 1 tablet under the tongue      omeprazole (PRILOSEC) 20 MG delayed release capsule TAKE ONE CAPSULE BY MOUTH EVERY OTHER DAY      polyethylene glycol (GLYCOLAX) 17 GM/SCOOP powder Take 17 g by mouth daily      potassium chloride (KLOR-CON) 10 MEQ extended release tablet Take 1 tablet by mouth daily      meclizine (ANTIVERT) 25 MG tablet Take 1 tablet by mouth 3 times daily as needed (Patient not taking: Reported on 10/24/2023)       No current facility-administered medications for this visit.         Allergies   Allergen Reactions    Latex Rash    Atorvastatin Other (See Comments)    Codeine Itching    Colchicine Other (See Comments)     Muscle pain    Oxycodone Other (See Comments)    Pantoprazole Other (See Comments)     Muscle pain     Phenazopyridine Other (See Comments)     NOT ALLERGIC    Nickel Rash     Patient Active Problem List    Diagnosis Date Noted    Permanent atrial fibrillation ([address]) 10/19/2021     Priority: High    Cystitis 10/27/2021    IBS (irritable bowel syndrome) 10/27/2021    Chest pain 10/25/2021    Snoring 11/07/2018    Severe obesity ([address]) 11/07/2018    Pulmonary HTN ([address]) 09/18/2017    NICM (nonischemic cardiomyopathy) ([address]) 07/13/2017    Takotsubo cardiomyopathy 05/30/2017    Sick sinus syndrome ([address]) 11/15/2016    Paroxysmal atrial fibrillation ([address]) 11/15/2016    Coronary artery disease involving native coronary artery of native heart without angina pectoris 10/11/2016    Shortness of breath 09/27/2016    Chronic fatigue 39/20/6260    Systolic CHF, chronic ([address]) 09/27/2016    Vitamin D deficiency 09/14/2016    Edema 09/14/2016    Arthralgia of multiple joints 09/14/2016    Herpes zoster

## 2023-12-31 RX ORDER — METOPROLOL SUCCINATE 25 MG/1
TABLET, EXTENDED RELEASE ORAL
Qty: 90 TABLET | OUTPATIENT
Start: 2023-12-31

## 2024-05-01 ENCOUNTER — OFFICE VISIT (OUTPATIENT)
Age: 74
End: 2024-05-01
Payer: MEDICARE

## 2024-05-01 VITALS
BODY MASS INDEX: 37.1 KG/M2 | SYSTOLIC BLOOD PRESSURE: 136 MMHG | WEIGHT: 209.4 LBS | HEART RATE: 68 BPM | DIASTOLIC BLOOD PRESSURE: 82 MMHG | HEIGHT: 63 IN

## 2024-05-01 DIAGNOSIS — I49.5 SICK SINUS SYNDROME (HCC): ICD-10-CM

## 2024-05-01 DIAGNOSIS — I1A.0 RESISTANT HYPERTENSION: ICD-10-CM

## 2024-05-01 DIAGNOSIS — I48.0 PAROXYSMAL ATRIAL FIBRILLATION (HCC): ICD-10-CM

## 2024-05-01 DIAGNOSIS — I25.10 CORONARY ARTERY DISEASE INVOLVING NATIVE CORONARY ARTERY OF NATIVE HEART WITHOUT ANGINA PECTORIS: ICD-10-CM

## 2024-05-01 DIAGNOSIS — I48.21 PERMANENT ATRIAL FIBRILLATION (HCC): Primary | ICD-10-CM

## 2024-05-01 DIAGNOSIS — I51.81 TAKOTSUBO CARDIOMYOPATHY: ICD-10-CM

## 2024-05-01 DIAGNOSIS — I50.22 SYSTOLIC CHF, CHRONIC (HCC): ICD-10-CM

## 2024-05-01 PROCEDURE — 99214 OFFICE O/P EST MOD 30 MIN: CPT | Performed by: INTERNAL MEDICINE

## 2024-05-01 PROCEDURE — 1090F PRES/ABSN URINE INCON ASSESS: CPT | Performed by: INTERNAL MEDICINE

## 2024-05-01 PROCEDURE — 3079F DIAST BP 80-89 MM HG: CPT | Performed by: INTERNAL MEDICINE

## 2024-05-01 PROCEDURE — G8400 PT W/DXA NO RESULTS DOC: HCPCS | Performed by: INTERNAL MEDICINE

## 2024-05-01 PROCEDURE — 1036F TOBACCO NON-USER: CPT | Performed by: INTERNAL MEDICINE

## 2024-05-01 PROCEDURE — G8428 CUR MEDS NOT DOCUMENT: HCPCS | Performed by: INTERNAL MEDICINE

## 2024-05-01 PROCEDURE — 1123F ACP DISCUSS/DSCN MKR DOCD: CPT | Performed by: INTERNAL MEDICINE

## 2024-05-01 PROCEDURE — G8417 CALC BMI ABV UP PARAM F/U: HCPCS | Performed by: INTERNAL MEDICINE

## 2024-05-01 PROCEDURE — 3075F SYST BP GE 130 - 139MM HG: CPT | Performed by: INTERNAL MEDICINE

## 2024-05-01 NOTE — PROGRESS NOTES
[address] [facility], [place], [place] [address]  PHONE: [phone]     24    NAME:  Annika Dixon  : 1950  MRN: [id number]       SUBJECTIVE:   Annika Dixon is a 74 y.o. female seen for a follow up visit regarding the following:     Chief Complaint   Patient presents with    Coronary Artery Disease       HPI:   Here for NICM/SHF/pAF.      Echo 2017: EF 25%, RVSP 60.   TE 2021: EF 45%, mod/severe pHTN   LHC 10/2021: Mild nonobstructive coronary artery disease   JATIN 10/2021: normal EF, mod TR   Watchman 10/2021     Walking more 30 min most days.  Getting over sinus infection.   NO new plp, MCINTOSH ok now.  No CP.   Lasix PRN now, 5-6 x per week, most weeks.    Patient denies recent history of orthopnea, PND, excessive dizziness and/or syncope.      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  Had bypass in , lost over 100 pds.        Intolerant to statins.             Past Medical History, Past Surgical History, Family history, Social History, and Medications were all reviewed with the patient today and updated as necessary.     Current Outpatient Medications   Medication Sig Dispense Refill    Multiple Vitamins-Minerals (MULTIVITAL PO) Take by mouth      alendronate (FOSAMAX) 70 MG tablet Take 1 tablet by mouth every 7 days      ARIPiprazole (ABILIFY) 5 MG tablet Take 1 tablet by mouth daily      clobetasol (TEMOVATE) 0.05 % cream Apply topically 2 times daily Apply topically 2 times daily.      dicyclomine (BENTYL) 10 MG capsule Take 1 capsule by mouth 4 times daily (before meals and nightly)      Evolocumab 140 MG/ML SOAJ Inject into the skin      aspirin 81 MG EC tablet Take 1 tablet by mouth daily      buPROPion (WELLBUTRIN XL) 300 MG extended release tablet Take 1 tablet by mouth daily      estradiol (ESTRACE) 2 MG tablet Take 0.5 tablets by mouth daily      fluticasone (FLONASE) 50 MCG/ACT nasal spray 2 sprays by Nasal route as needed      furosemide (LASIX) 40 MG tablet

## 2024-11-20 ENCOUNTER — OFFICE VISIT (OUTPATIENT)
Age: 74
End: 2024-11-20
Payer: MEDICARE

## 2024-11-20 VITALS
WEIGHT: 210 LBS | BODY MASS INDEX: 35.85 KG/M2 | SYSTOLIC BLOOD PRESSURE: 112 MMHG | HEART RATE: 60 BPM | DIASTOLIC BLOOD PRESSURE: 68 MMHG | HEIGHT: 64 IN

## 2024-11-20 DIAGNOSIS — I27.20 PULMONARY HTN (HCC): ICD-10-CM

## 2024-11-20 DIAGNOSIS — I42.8 NICM (NONISCHEMIC CARDIOMYOPATHY) (HCC): ICD-10-CM

## 2024-11-20 DIAGNOSIS — I49.5 SICK SINUS SYNDROME (HCC): ICD-10-CM

## 2024-11-20 DIAGNOSIS — I51.81 TAKOTSUBO CARDIOMYOPATHY: ICD-10-CM

## 2024-11-20 DIAGNOSIS — I25.10 CORONARY ARTERY DISEASE INVOLVING NATIVE CORONARY ARTERY OF NATIVE HEART WITHOUT ANGINA PECTORIS: ICD-10-CM

## 2024-11-20 DIAGNOSIS — I48.0 PAROXYSMAL ATRIAL FIBRILLATION (HCC): ICD-10-CM

## 2024-11-20 DIAGNOSIS — I10 PRIMARY HYPERTENSION: ICD-10-CM

## 2024-11-20 DIAGNOSIS — I50.22 SYSTOLIC CHF, CHRONIC (HCC): Primary | ICD-10-CM

## 2024-11-20 PROCEDURE — G8484 FLU IMMUNIZE NO ADMIN: HCPCS | Performed by: INTERNAL MEDICINE

## 2024-11-20 PROCEDURE — 1036F TOBACCO NON-USER: CPT | Performed by: INTERNAL MEDICINE

## 2024-11-20 PROCEDURE — 1123F ACP DISCUSS/DSCN MKR DOCD: CPT | Performed by: INTERNAL MEDICINE

## 2024-11-20 PROCEDURE — G8400 PT W/DXA NO RESULTS DOC: HCPCS | Performed by: INTERNAL MEDICINE

## 2024-11-20 PROCEDURE — G8417 CALC BMI ABV UP PARAM F/U: HCPCS | Performed by: INTERNAL MEDICINE

## 2024-11-20 PROCEDURE — 99214 OFFICE O/P EST MOD 30 MIN: CPT | Performed by: INTERNAL MEDICINE

## 2024-11-20 PROCEDURE — 3078F DIAST BP <80 MM HG: CPT | Performed by: INTERNAL MEDICINE

## 2024-11-20 PROCEDURE — 1090F PRES/ABSN URINE INCON ASSESS: CPT | Performed by: INTERNAL MEDICINE

## 2024-11-20 PROCEDURE — G8428 CUR MEDS NOT DOCUMENT: HCPCS | Performed by: INTERNAL MEDICINE

## 2024-11-20 PROCEDURE — 1126F AMNT PAIN NOTED NONE PRSNT: CPT | Performed by: INTERNAL MEDICINE

## 2024-11-20 PROCEDURE — 3074F SYST BP LT 130 MM HG: CPT | Performed by: INTERNAL MEDICINE

## 2024-11-20 NOTE — PROGRESS NOTES
[address] [place] [address], [place] [address]  PHONE: [phone]     24    NAME:  Annika Dixon  : 1950  MRN: [id number]       SUBJECTIVE:   Annika Dixon is a 74 y.o. female seen for a follow up visit regarding the following:     Chief Complaint   Patient presents with    Atrial Fibrillation       HPI: Here for NICM/SHF/pAF.      Echo 2017: EF 25%, RVSP 60.   TE 2021: EF 45%, mod/severe pHTN   LHC 10/2021: Mild nonobstructive coronary artery disease   JATIN 10/2021: normal EF, mod TR   Watchman 10/2021     Walking more 30 min most days, doing indoor walking.  NO new palp, MCINTOSH ok now.  No CP.   Lasix PRN now, 5-6 x per week, most weeks.     No new angina.   Patient denies recent history of orthopnea, PND, excessive dizziness and/or syncope.      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  Had bypass in , lost over 100 pds.        Intolerant to statins.         Past Medical History, Past Surgical History, Family history, Social History, and Medications were all reviewed with the patient today and updated as necessary.     Current Outpatient Medications   Medication Sig Dispense Refill    Multiple Vitamins-Minerals (MULTIVITAL PO) Take by mouth      alendronate (FOSAMAX) 70 MG tablet Take 1 tablet by mouth every 7 days      ARIPiprazole (ABILIFY) 5 MG tablet Take 1 tablet by mouth daily      clobetasol (TEMOVATE) 0.05 % cream Apply topically 2 times daily Apply topically 2 times daily.      dicyclomine (BENTYL) 10 MG capsule Take 1 capsule by mouth 4 times daily (before meals and nightly)      Evolocumab 140 MG/ML SOAJ Inject into the skin      aspirin 81 MG EC tablet Take 1 tablet by mouth daily      buPROPion (WELLBUTRIN XL) 300 MG extended release tablet Take 1 tablet by mouth daily      estradiol (ESTRACE) 2 MG tablet Take 0.5 tablets by mouth daily      fluticasone (FLONASE) 50 MCG/ACT nasal spray 2 sprays by Nasal route as needed      furosemide (LASIX) 40 MG tablet

## 2025-07-15 ENCOUNTER — OFFICE VISIT (OUTPATIENT)
Age: 75
End: 2025-07-15
Payer: MEDICARE

## 2025-07-15 VITALS
BODY MASS INDEX: 36.02 KG/M2 | WEIGHT: 211 LBS | SYSTOLIC BLOOD PRESSURE: 138 MMHG | HEART RATE: 70 BPM | HEIGHT: 64 IN | DIASTOLIC BLOOD PRESSURE: 76 MMHG

## 2025-07-15 DIAGNOSIS — I48.0 PAROXYSMAL ATRIAL FIBRILLATION (HCC): ICD-10-CM

## 2025-07-15 DIAGNOSIS — R06.02 SHORTNESS OF BREATH: ICD-10-CM

## 2025-07-15 DIAGNOSIS — I51.81 TAKOTSUBO CARDIOMYOPATHY: ICD-10-CM

## 2025-07-15 DIAGNOSIS — I42.8 NICM (NONISCHEMIC CARDIOMYOPATHY) (HCC): ICD-10-CM

## 2025-07-15 DIAGNOSIS — I27.20 PULMONARY HTN (HCC): ICD-10-CM

## 2025-07-15 DIAGNOSIS — I50.22 SYSTOLIC CHF, CHRONIC (HCC): ICD-10-CM

## 2025-07-15 DIAGNOSIS — I25.10 CORONARY ARTERY DISEASE INVOLVING NATIVE CORONARY ARTERY OF NATIVE HEART WITHOUT ANGINA PECTORIS: Primary | ICD-10-CM

## 2025-07-15 PROCEDURE — 3075F SYST BP GE 130 - 139MM HG: CPT | Performed by: INTERNAL MEDICINE

## 2025-07-15 PROCEDURE — G8400 PT W/DXA NO RESULTS DOC: HCPCS | Performed by: INTERNAL MEDICINE

## 2025-07-15 PROCEDURE — 99214 OFFICE O/P EST MOD 30 MIN: CPT | Performed by: INTERNAL MEDICINE

## 2025-07-15 PROCEDURE — 1123F ACP DISCUSS/DSCN MKR DOCD: CPT | Performed by: INTERNAL MEDICINE

## 2025-07-15 PROCEDURE — 93000 ELECTROCARDIOGRAM COMPLETE: CPT | Performed by: INTERNAL MEDICINE

## 2025-07-15 PROCEDURE — 3078F DIAST BP <80 MM HG: CPT | Performed by: INTERNAL MEDICINE

## 2025-07-15 PROCEDURE — 1126F AMNT PAIN NOTED NONE PRSNT: CPT | Performed by: INTERNAL MEDICINE

## 2025-07-15 PROCEDURE — G8417 CALC BMI ABV UP PARAM F/U: HCPCS | Performed by: INTERNAL MEDICINE

## 2025-07-15 PROCEDURE — 1090F PRES/ABSN URINE INCON ASSESS: CPT | Performed by: INTERNAL MEDICINE

## 2025-07-15 PROCEDURE — G8428 CUR MEDS NOT DOCUMENT: HCPCS | Performed by: INTERNAL MEDICINE

## 2025-07-15 PROCEDURE — 1036F TOBACCO NON-USER: CPT | Performed by: INTERNAL MEDICINE

## 2025-07-15 NOTE — PROGRESS NOTES
09/14/2016    Esophageal reflux 09/14/2016    Malignant melanoma of skin of upper limb, including shoulder (HCC) 09/14/2016    Hypercholesteremia 09/14/2016    Hypertension 09/14/2016      Past Surgical History:   Procedure Laterality Date    CHOLECYSTECTOMY      GASTRIC BYPASS SURGERY      HYSTERECTOMY (CERVIX STATUS UNKNOWN)      ORTHOPEDIC SURGERY      bilateral hand surgeries    ORTHOPEDIC SURGERY      right rotator cuff    TONSILLECTOMY       Family History   Problem Relation Age of Onset    Sudden Death Father     Stroke Father     Heart Attack Father     Coronary Art Dis Father      Social History     Tobacco Use    Smoking status: Never    Smokeless tobacco: Never   Substance Use Topics    Alcohol use: No         ROS:    No obvious pertinent positives on review of systems except for what was outlined in the HPI today.      PHYSICAL EXAM:     /76   Pulse 70   Ht 1.626 m (5' 4\")   Wt 95.7 kg (211 lb)   BMI 36.22 kg/m²    General/Constitutional:   Alert and oriented x 3, no acute distress  HEENT:   normocephalic, atraumatic, moist mucous membranes  Neck:   No JVD or carotid bruits bilaterally  Cardiovascular:   regular rate and rhythm, no murmur/rub/gallop appreciated  Pulmonary:   clear to auscultation bilaterally, no respiratory distress  Abdomen:   soft, non-tender, non-distended  Ext:   No sig LE edema bilaterally  Skin:  warm and dry, no obvious rashes seen  Neuro:   no obvious sensory or motor deficits  Psychiatric:   normal mood and affect      Lab Results   Component Value Date/Time     12/10/2021 10:17 AM    K 3.9 12/10/2021 10:17 AM     12/10/2021 10:17 AM    CO2 27 12/10/2021 10:17 AM    BUN 17 12/10/2021 10:17 AM    CREATININE 0.93 12/10/2021 10:17 AM    GLUCOSE 89 12/10/2021 10:17 AM    CALCIUM 9.2 12/10/2021 10:17 AM        Lab Results   Component Value Date    WBC 7.3 12/10/2021    HGB 11.4 (L) 12/10/2021    HCT 36.9 12/10/2021    MCV 90.9 12/10/2021     12/10/2021

## (undated) DEVICE — CATHETER ANGIO 5FR L100CM GRY S STL NYL JL3.5 3 SEG BRAID L

## (undated) DEVICE — KENDALL RADIOLUCENT FOAM MONITORING ELECTRODE RECTANGULAR SHAPE: Brand: KENDALL

## (undated) DEVICE — GLIDESHEATH SLENDER STAINLESS STEEL KIT: Brand: GLIDESHEATH SLENDER

## (undated) DEVICE — PINNACLE TIF INTRODUCER SHEATH: Brand: PINNACLE

## (undated) DEVICE — CATHETER ULTRASOUND NAVIGATIONAL 10 FRX90 CM VIVID I ACUNAV

## (undated) DEVICE — CHECK-FLO PERFORMER INTRODUCER: Brand: PERFORMER

## (undated) DEVICE — CATHETER ANGIO 5FR L100CM GRY S STL NYL JR4 3 SEG BRAID L

## (undated) DEVICE — INTRODUCER TRANSSEPTAL GUID 8.5FR L63CM DIL 8.5FR L67CM

## (undated) DEVICE — PINNACLE INTRODUCER SHEATH: Brand: PINNACLE

## (undated) DEVICE — SYSTEM CLOSURE 6-12 FR VEN VASC VASCADE MVP

## (undated) DEVICE — CANNULA NSL ORAL AD FOR CAPNOFLEX CO2 O2 AIRLFE

## (undated) DEVICE — GUIDEWIRE 035IN 210CM PTFE COAT FIX COR J TIP 15MM FIRM BODY

## (undated) DEVICE — CONTAINER PREFIL FRMLN 40ML --

## (undated) DEVICE — BAND COMPR L21CM SHT CLR PLAS HEMSTAT EXT HK AND LOOP RETEN

## (undated) DEVICE — ACCESS SHEATH WITH DILATOR: Brand: WATCHMAN™ TRUSEAL™ ACCESS SYSTEM

## (undated) DEVICE — CONNECTOR TBNG OD5-7MM O2 END DISP

## (undated) DEVICE — Device: Brand: NRG TRANSSEPTAL NEEDLE

## (undated) DEVICE — 18G NG KIT WITH 96IN PROBE COVER (10 PK): Brand: SITE-RITE

## (undated) DEVICE — GUIDEWIRE VASC L260CM DIA0.035IN L7CM DIA3MM J TIP PTFE S

## (undated) DEVICE — FORCEPS BX L240CM JAW DIA2.8MM L CAP W/ NDL MIC MESH TOOTH

## (undated) DEVICE — CATHETER DIAG AD 6FR L110CM 0.038IN COR POLYUR PGTL W/ 6

## (undated) DEVICE — CATHETER DIAG AD 5FR L110CM 145DEG COR GRY HYDRPHLC NYL

## (undated) DEVICE — PERCLOSE PROGLIDE™ SUTURE-MEDIATED CLOSURE SYSTEM: Brand: PERCLOSE PROGLIDE™

## (undated) DEVICE — BLOCK BITE AD 60FR W/ VELC STRP ADDRESSES MOST PT AND